# Patient Record
Sex: FEMALE | Race: OTHER | Employment: FULL TIME | URBAN - METROPOLITAN AREA
[De-identification: names, ages, dates, MRNs, and addresses within clinical notes are randomized per-mention and may not be internally consistent; named-entity substitution may affect disease eponyms.]

---

## 2017-04-26 RX ORDER — LEVOTHYROXINE SODIUM 0.05 MG/1
TABLET ORAL
Qty: 30 TABLET | Refills: 0 | Status: SHIPPED | OUTPATIENT
Start: 2017-04-26 | End: 2017-09-14

## 2017-07-10 RX ORDER — LEVOTHYROXINE SODIUM 0.05 MG/1
TABLET ORAL
Qty: 30 TABLET | Refills: 0 | OUTPATIENT
Start: 2017-07-10

## 2017-07-10 NOTE — TELEPHONE ENCOUNTER
LOV 3/2016. Letter already sent. Called patient but line disconnected. Sent mychart informing her she needs to schedule follow up.

## 2017-07-10 NOTE — TELEPHONE ENCOUNTER
Current Outpatient Prescriptions:  LEVOTHYROXINE SODIUM 50 MCG Oral Tab TAKE 1 TABLET (50 MCG TOTAL) BY MOUTH BEFORE BREAKFAST.  Disp: 30 tablet Rfl: 0

## 2017-07-12 RX ORDER — LEVOTHYROXINE SODIUM 0.05 MG/1
50 TABLET ORAL
Qty: 30 TABLET | Refills: 0 | Status: SHIPPED | OUTPATIENT
Start: 2017-07-12 | End: 2017-07-19

## 2017-07-12 NOTE — TELEPHONE ENCOUNTER
LOV 3/2016. Letter sent in April. Sent patient mychart message yesterday informing her she needs follow up and she did read message. Has not scheduled yet.

## 2017-07-18 ENCOUNTER — PATIENT MESSAGE (OUTPATIENT)
Dept: ENDOCRINOLOGY CLINIC | Facility: CLINIC | Age: 27
End: 2017-07-18

## 2017-07-18 NOTE — TELEPHONE ENCOUNTER
From: Luis Chow  To: Maykel Dodd MD  Sent: 7/18/2017 9:41 AM CDT  Subject: Prescription Question    Hello, i had recently had my prescription approved and it was only filled 1/4.  Now i received another message saying they were denied, why were they

## 2017-07-18 NOTE — TELEPHONE ENCOUNTER
SH, please advise. Pt has appt booked 09/13 (1st available) and would like refills to last until that appt.

## 2017-07-19 RX ORDER — LEVOTHYROXINE SODIUM 0.05 MG/1
50 TABLET ORAL
Qty: 30 TABLET | Refills: 1 | Status: SHIPPED | OUTPATIENT
Start: 2017-07-19 | End: 2017-09-13

## 2017-09-13 ENCOUNTER — OFFICE VISIT (OUTPATIENT)
Dept: ENDOCRINOLOGY CLINIC | Facility: CLINIC | Age: 27
End: 2017-09-13

## 2017-09-13 ENCOUNTER — TELEPHONE (OUTPATIENT)
Dept: ENDOCRINOLOGY CLINIC | Facility: CLINIC | Age: 27
End: 2017-09-13

## 2017-09-13 ENCOUNTER — APPOINTMENT (OUTPATIENT)
Dept: LAB | Age: 27
End: 2017-09-13
Attending: INTERNAL MEDICINE
Payer: COMMERCIAL

## 2017-09-13 VITALS
WEIGHT: 225.63 LBS | HEIGHT: 65 IN | HEART RATE: 79 BPM | BODY MASS INDEX: 37.59 KG/M2 | SYSTOLIC BLOOD PRESSURE: 112 MMHG | DIASTOLIC BLOOD PRESSURE: 78 MMHG

## 2017-09-13 DIAGNOSIS — E03.9 HYPOTHYROIDISM, UNSPECIFIED TYPE: Primary | ICD-10-CM

## 2017-09-13 DIAGNOSIS — E89.0 POSTABLATIVE HYPOTHYROIDISM: ICD-10-CM

## 2017-09-13 DIAGNOSIS — E89.0 POSTABLATIVE HYPOTHYROIDISM: Primary | ICD-10-CM

## 2017-09-13 LAB
T4 FREE SERPL-MCNC: 0.82 NG/DL (ref 0.58–1.64)
TSH SERPL-ACNC: 4.82 UIU/ML (ref 0.45–5.33)

## 2017-09-13 PROCEDURE — 84443 ASSAY THYROID STIM HORMONE: CPT

## 2017-09-13 PROCEDURE — 99213 OFFICE O/P EST LOW 20 MIN: CPT | Performed by: INTERNAL MEDICINE

## 2017-09-13 PROCEDURE — 99212 OFFICE O/P EST SF 10 MIN: CPT | Performed by: INTERNAL MEDICINE

## 2017-09-13 PROCEDURE — 36415 COLL VENOUS BLD VENIPUNCTURE: CPT

## 2017-09-13 PROCEDURE — 84439 ASSAY OF FREE THYROXINE: CPT

## 2017-09-13 RX ORDER — LEVOTHYROXINE SODIUM 0.05 MG/1
50 TABLET ORAL
Qty: 30 TABLET | Refills: 11 | Status: SHIPPED | OUTPATIENT
Start: 2017-09-13 | End: 2017-09-14

## 2017-09-13 NOTE — TELEPHONE ENCOUNTER
Please call patient - she is not getting enough thyroid hormone. Please increase LT4 to 75mcg PO daily, #30, refill 6 and repeat TSH, FT4 in 2 months.

## 2017-09-13 NOTE — PROGRESS NOTES
Name: Deric Beyer  Date: 9/13/2017    Referring Physician: No ref.  provider found    Patient presents with:  Hypothyroidism      HISTORY OF PRESENT ILLNESS   Deric Beyer is a 23year old female who presents for Patient presents with:  Hypothyroidis Allergies    Social History:   Social History    Marital status: Single              Spouse name:                       Years of education:                 Number of children:               Social History Main Topics    Smoking status: Never Smoker hypothyroidism  -Continue LT4 50mcg PO daily, discussed importance of taking medication in AM on empty stomach and waiting 30-60 min before eating  -Repeat TSH, FT4 to evaluate dose     RTC 1 year     9/13/2017  Tonya Mayo MD

## 2017-09-14 RX ORDER — LEVOTHYROXINE SODIUM 0.07 MG/1
75 TABLET ORAL
Qty: 30 TABLET | Refills: 6 | Status: CANCELLED | OUTPATIENT
Start: 2017-09-14

## 2017-09-14 NOTE — TELEPHONE ENCOUNTER
Spoke with Ava Leach and advised note from [facility] below. Pt understands to take increased dose of LT4 75 mcg daily in the morning 30-60 mins before meal and to repeat TSH and FT4 labs in 2 months around 11/14/17. Pt verbalized understanding.   Meds filled per [facility]

## 2017-10-05 ENCOUNTER — OFFICE VISIT (OUTPATIENT)
Dept: FAMILY MEDICINE CLINIC | Facility: CLINIC | Age: 27
End: 2017-10-05

## 2017-10-05 VITALS
DIASTOLIC BLOOD PRESSURE: 77 MMHG | WEIGHT: 222 LBS | HEIGHT: 65.5 IN | SYSTOLIC BLOOD PRESSURE: 109 MMHG | BODY MASS INDEX: 36.54 KG/M2 | HEART RATE: 79 BPM

## 2017-10-05 DIAGNOSIS — E89.0 HYPOTHYROIDISM, POSTABLATIVE: ICD-10-CM

## 2017-10-05 DIAGNOSIS — E66.09 NON MORBID OBESITY DUE TO EXCESS CALORIES: ICD-10-CM

## 2017-10-05 DIAGNOSIS — K57.30 DIVERTICULOSIS OF LARGE INTESTINE WITHOUT HEMORRHAGE: ICD-10-CM

## 2017-10-05 DIAGNOSIS — Z00.00 ANNUAL PHYSICAL EXAM: Primary | ICD-10-CM

## 2017-10-05 PROCEDURE — 99395 PREV VISIT EST AGE 18-39: CPT | Performed by: FAMILY MEDICINE

## 2017-10-05 RX ORDER — CLINDAMYCIN HYDROCHLORIDE 300 MG/1
1 CAPSULE ORAL 2 TIMES DAILY
COMMUNITY
Start: 2017-10-04 | End: 2018-12-08

## 2017-10-05 RX ORDER — HYDROCODONE BITARTRATE AND ACETAMINOPHEN 5; 325 MG/1; MG/1
1 TABLET ORAL 2 TIMES DAILY PRN
COMMUNITY
Start: 2017-10-04 | End: 2018-12-08

## 2017-10-05 RX ORDER — IBUPROFEN 800 MG/1
1 TABLET ORAL 2 TIMES DAILY PRN
COMMUNITY
Start: 2017-10-04 | End: 2018-12-08

## 2017-10-05 RX ORDER — LEVOTHYROXINE SODIUM 0.05 MG/1
1 TABLET ORAL DAILY
COMMUNITY
Start: 2017-09-13 | End: 2018-12-08

## 2017-10-05 NOTE — PROGRESS NOTES
Physical        Patient's past medical surgical family social history was reviewed. Review of Systems  Allergic: no environmental allergies or food allergies  Cardiovascular: no chest pain, irregular heartbeat, or palpitations.   Constitutional: no feve

## 2017-11-11 ENCOUNTER — APPOINTMENT (OUTPATIENT)
Dept: LAB | Age: 27
End: 2017-11-11
Attending: INTERNAL MEDICINE
Payer: COMMERCIAL

## 2017-11-11 DIAGNOSIS — E66.09 NON MORBID OBESITY DUE TO EXCESS CALORIES: ICD-10-CM

## 2017-11-11 DIAGNOSIS — Z00.00 ANNUAL PHYSICAL EXAM: ICD-10-CM

## 2017-11-11 DIAGNOSIS — E03.9 HYPOTHYROIDISM, UNSPECIFIED TYPE: ICD-10-CM

## 2017-11-11 PROCEDURE — 84443 ASSAY THYROID STIM HORMONE: CPT

## 2017-11-11 PROCEDURE — 80061 LIPID PANEL: CPT

## 2017-11-11 PROCEDURE — 80053 COMPREHEN METABOLIC PANEL: CPT

## 2017-11-11 PROCEDURE — 36415 COLL VENOUS BLD VENIPUNCTURE: CPT

## 2017-11-11 PROCEDURE — 84439 ASSAY OF FREE THYROXINE: CPT

## 2017-11-13 ENCOUNTER — TELEPHONE (OUTPATIENT)
Dept: ENDOCRINOLOGY CLINIC | Facility: CLINIC | Age: 27
End: 2017-11-13

## 2017-11-13 DIAGNOSIS — E03.9 HYPOTHYROIDISM, UNSPECIFIED TYPE: Primary | ICD-10-CM

## 2017-11-13 NOTE — TELEPHONE ENCOUNTER
Please call patient - she is still not getting enough thyroid hormone, increase LT4 100mcg PO daily #30, refill 6 and repeat TSH, FT4 in 2 months.

## 2017-11-14 RX ORDER — LEVOTHYROXINE SODIUM 0.07 MG/1
75 TABLET ORAL
Qty: 30 TABLET | Refills: 6 | Status: SHIPPED | OUTPATIENT
Start: 2017-11-14 | End: 2018-06-27

## 2017-11-14 NOTE — TELEPHONE ENCOUNTER
Called Jason. She states that several weeks ago she was supposed to increase her levothyroxine dose to 75 mcg but she continued with 50 mcg. Would you still like to increase to 100 mcg?

## 2017-11-18 ENCOUNTER — TELEPHONE (OUTPATIENT)
Dept: FAMILY MEDICINE CLINIC | Facility: CLINIC | Age: 27
End: 2017-11-18

## 2017-11-18 NOTE — TELEPHONE ENCOUNTER
----- Message from Aamir Lira DO sent at 11/17/2017  7:44 AM CST -----  Thyroid was off. Make sure she follows up with Dr. Nani Alonso.    CMP lipids were all normal

## 2018-06-28 RX ORDER — LEVOTHYROXINE SODIUM 0.07 MG/1
75 TABLET ORAL
Qty: 90 TABLET | Refills: 0 | Status: SHIPPED | OUTPATIENT
Start: 2018-06-28 | End: 2018-09-09

## 2018-06-28 NOTE — TELEPHONE ENCOUNTER
From: Micky Cohen  Sent: 6/27/2018 9:16 AM CDT  Subject: Medication Renewal Request    Micky Cohen would like a refill of the following medications:     Levothyroxine Sodium 75 MCG Oral Tab [Jennifer Lao MD]   Patient Comment: Did not get a chance

## 2018-09-10 RX ORDER — LEVOTHYROXINE SODIUM 0.07 MG/1
TABLET ORAL
Qty: 30 TABLET | Refills: 0 | Status: SHIPPED | OUTPATIENT
Start: 2018-09-10 | End: 2018-10-18

## 2018-10-18 RX ORDER — LEVOTHYROXINE SODIUM 0.07 MG/1
TABLET ORAL
Qty: 30 TABLET | Refills: 0 | Status: SHIPPED | OUTPATIENT
Start: 2018-10-18 | End: 2018-10-29

## 2018-10-27 ENCOUNTER — APPOINTMENT (OUTPATIENT)
Dept: LAB | Facility: HOSPITAL | Age: 28
End: 2018-10-27
Attending: INTERNAL MEDICINE
Payer: COMMERCIAL

## 2018-10-27 DIAGNOSIS — E03.9 HYPOTHYROIDISM, UNSPECIFIED TYPE: ICD-10-CM

## 2018-10-27 PROCEDURE — 84443 ASSAY THYROID STIM HORMONE: CPT

## 2018-10-27 PROCEDURE — 36415 COLL VENOUS BLD VENIPUNCTURE: CPT

## 2018-10-27 PROCEDURE — 84439 ASSAY OF FREE THYROXINE: CPT

## 2018-10-29 ENCOUNTER — OFFICE VISIT (OUTPATIENT)
Dept: ENDOCRINOLOGY CLINIC | Facility: CLINIC | Age: 28
End: 2018-10-29
Payer: COMMERCIAL

## 2018-10-29 VITALS
HEART RATE: 80 BPM | DIASTOLIC BLOOD PRESSURE: 79 MMHG | BODY MASS INDEX: 40 KG/M2 | WEIGHT: 246.38 LBS | SYSTOLIC BLOOD PRESSURE: 113 MMHG

## 2018-10-29 DIAGNOSIS — E03.8 HYPOTHYROIDISM DUE TO HASHIMOTO'S THYROIDITIS: Primary | ICD-10-CM

## 2018-10-29 DIAGNOSIS — E06.3 HYPOTHYROIDISM DUE TO HASHIMOTO'S THYROIDITIS: Primary | ICD-10-CM

## 2018-10-29 PROCEDURE — 99212 OFFICE O/P EST SF 10 MIN: CPT | Performed by: INTERNAL MEDICINE

## 2018-10-29 PROCEDURE — 99213 OFFICE O/P EST LOW 20 MIN: CPT | Performed by: INTERNAL MEDICINE

## 2018-10-29 RX ORDER — LEVOTHYROXINE SODIUM 0.1 MG/1
100 TABLET ORAL
Qty: 90 TABLET | Refills: 1 | Status: SHIPPED | OUTPATIENT
Start: 2018-10-29 | End: 2018-12-15

## 2018-10-29 NOTE — PROGRESS NOTES
Name: Radha Bains  Date: 10/29/2018    Referring Physician: No ref. provider found    Patient presents with: Follow - Up: Thyroid      HISTORY OF PRESENT ILLNESS   Radha Bains is a 33year old female who presents for Patient presents with:   Follow - U capsule by mouth 2 (two) times daily. , Disp: , Rfl:   •  ibuprofen 800 MG Oral Tab, Take 1 tablet by mouth 2 (two) times daily as needed. , Disp: , Rfl:   •  Levothyroxine Sodium 50 MCG Oral Tab, Take 1 tablet by mouth daily. , Disp: , Rfl:      Allergies: (111.8 kg)   BMI 40.38 kg/m²     General Appearance:  Alert, in no acute distress, well developed  Eyes: normal conjunctivae, sclera.   Ears/Nose/Mouth/Throat/Neck:  normal hearing, normal speech and diffusely enlarged thyroid gland, irregular texture  Neur

## 2018-12-08 ENCOUNTER — OFFICE VISIT (OUTPATIENT)
Dept: FAMILY MEDICINE CLINIC | Facility: CLINIC | Age: 28
End: 2018-12-08
Payer: COMMERCIAL

## 2018-12-08 ENCOUNTER — LAB ENCOUNTER (OUTPATIENT)
Dept: LAB | Age: 28
End: 2018-12-08
Attending: FAMILY MEDICINE
Payer: COMMERCIAL

## 2018-12-08 VITALS
SYSTOLIC BLOOD PRESSURE: 110 MMHG | WEIGHT: 240 LBS | DIASTOLIC BLOOD PRESSURE: 75 MMHG | HEIGHT: 65.5 IN | HEART RATE: 77 BPM | BODY MASS INDEX: 39.51 KG/M2

## 2018-12-08 DIAGNOSIS — Z12.4 ROUTINE PAPANICOLAOU SMEAR: ICD-10-CM

## 2018-12-08 DIAGNOSIS — Z00.00 ANNUAL PHYSICAL EXAM: ICD-10-CM

## 2018-12-08 DIAGNOSIS — E66.09 NON MORBID OBESITY DUE TO EXCESS CALORIES: ICD-10-CM

## 2018-12-08 DIAGNOSIS — E89.0 HYPOTHYROIDISM, POSTABLATIVE: ICD-10-CM

## 2018-12-08 DIAGNOSIS — Z00.00 ANNUAL PHYSICAL EXAM: Primary | ICD-10-CM

## 2018-12-08 DIAGNOSIS — N97.9 INFERTILITY, FEMALE: ICD-10-CM

## 2018-12-08 PROCEDURE — 84403 ASSAY OF TOTAL TESTOSTERONE: CPT

## 2018-12-08 PROCEDURE — 80053 COMPREHEN METABOLIC PANEL: CPT

## 2018-12-08 PROCEDURE — 85025 COMPLETE CBC W/AUTO DIFF WBC: CPT

## 2018-12-08 PROCEDURE — 36415 COLL VENOUS BLD VENIPUNCTURE: CPT

## 2018-12-08 PROCEDURE — 99395 PREV VISIT EST AGE 18-39: CPT | Performed by: FAMILY MEDICINE

## 2018-12-08 PROCEDURE — 90471 IMMUNIZATION ADMIN: CPT | Performed by: FAMILY MEDICINE

## 2018-12-08 PROCEDURE — 84402 ASSAY OF FREE TESTOSTERONE: CPT

## 2018-12-08 PROCEDURE — 90686 IIV4 VACC NO PRSV 0.5 ML IM: CPT | Performed by: FAMILY MEDICINE

## 2018-12-08 PROCEDURE — 80061 LIPID PANEL: CPT

## 2018-12-08 NOTE — PROGRESS NOTES
REASON FOR VISIT:    Graciela Mak is a 33year old female who presents for an [address]         Patient Active Problem List:     Hyperthyroidism     Left ovarian cyst     Non morbid obesity due to excess calories     Diverticulosis of large born [address] [facility]  No results found for: HCVAB   Tuberculosis Screen If high risk No components found for: PPDINDURAT       SPECIFIC DISEASE MONITORING Internal Lab or Procedure   No disease specific diagnoses    ALLERGIES:   No Known Allergies  CURRENT MEDI well nourished, in no apparent distress, obese  SKIN: no rashes, no suspicious lesions  HEENT: atraumatic, normocephalic, ears and throat are clear  EYES:PERRLA, EOMI, conjunctiva are clear.  normal optic disc  NECK: supple, no adenopathy, no bruits  CHEST: Annually   Pneumococcal if high risk   Td/Tdap once then every 10 years   HPV Males 11-21   Zoster (Shingles) 60 and older: one dose   Varicella 2 doses if not immune   MMR 1-2 doses if born after 1955 and not immune

## 2018-12-11 DIAGNOSIS — R71.8 LOW MEAN CORPUSCULAR VOLUME (MCV): Primary | ICD-10-CM

## 2018-12-14 ENCOUNTER — TELEPHONE (OUTPATIENT)
Dept: FAMILY MEDICINE CLINIC | Facility: CLINIC | Age: 28
End: 2018-12-14

## 2018-12-14 NOTE — TELEPHONE ENCOUNTER
Unable to LM. Lab test for iron order with existing blood.  NO need for patient to return for blood draw

## 2018-12-14 NOTE — TELEPHONE ENCOUNTER
----- Message from Harsha Nicolas DO sent at 12/11/2018  5:40 PM CST -----  Hormone levels were normal cholesterol and blood sugar were normal liver kidney testing was normal.  Her red blood cells were small so I will order some iron studies.   RN pleas

## 2018-12-15 DIAGNOSIS — E06.3 HYPOTHYROIDISM DUE TO HASHIMOTO'S THYROIDITIS: ICD-10-CM

## 2018-12-15 DIAGNOSIS — E03.8 HYPOTHYROIDISM DUE TO HASHIMOTO'S THYROIDITIS: ICD-10-CM

## 2018-12-17 RX ORDER — LEVOTHYROXINE SODIUM 0.1 MG/1
100 TABLET ORAL
Qty: 90 TABLET | Refills: 1 | Status: SHIPPED | OUTPATIENT
Start: 2018-12-17 | End: 2019-06-07

## 2018-12-19 ENCOUNTER — OFFICE VISIT (OUTPATIENT)
Dept: OBGYN CLINIC | Facility: CLINIC | Age: 28
End: 2018-12-19
Payer: COMMERCIAL

## 2018-12-19 ENCOUNTER — APPOINTMENT (OUTPATIENT)
Dept: LAB | Age: 28
End: 2018-12-19
Attending: FAMILY MEDICINE
Payer: COMMERCIAL

## 2018-12-19 ENCOUNTER — TELEPHONE (OUTPATIENT)
Dept: OBGYN CLINIC | Facility: CLINIC | Age: 28
End: 2018-12-19

## 2018-12-19 VITALS
HEART RATE: 91 BPM | DIASTOLIC BLOOD PRESSURE: 73 MMHG | WEIGHT: 242 LBS | HEIGHT: 65.5 IN | BODY MASS INDEX: 39.84 KG/M2 | SYSTOLIC BLOOD PRESSURE: 114 MMHG

## 2018-12-19 DIAGNOSIS — Z12.4 CERVICAL CANCER SCREENING: ICD-10-CM

## 2018-12-19 DIAGNOSIS — R71.8 LOW MEAN CORPUSCULAR VOLUME: ICD-10-CM

## 2018-12-19 DIAGNOSIS — Z31.69 ENCOUNTER FOR PRECONCEPTION CONSULTATION: ICD-10-CM

## 2018-12-19 DIAGNOSIS — Z01.419 ENCOUNTER FOR GYNECOLOGICAL EXAMINATION WITHOUT ABNORMAL FINDING: Primary | ICD-10-CM

## 2018-12-19 PROCEDURE — 84466 ASSAY OF TRANSFERRIN: CPT

## 2018-12-19 PROCEDURE — 99395 PREV VISIT EST AGE 18-39: CPT | Performed by: OBSTETRICS & GYNECOLOGY

## 2018-12-19 PROCEDURE — 83540 ASSAY OF IRON: CPT

## 2018-12-19 PROCEDURE — 36415 COLL VENOUS BLD VENIPUNCTURE: CPT

## 2018-12-19 NOTE — TELEPHONE ENCOUNTER
[address] called to report that pt needs a 20 min appt slot for her appt today in [place] since she is scheduled for an annual. [address] asked if RN can call pt and see if she can come at 9:20am instead of 9:30am. Spoke with pt who stated she can come for appt at 9:20am.

## 2018-12-19 NOTE — PROGRESS NOTES
Well Woman Exam    HPI:  The patient is a 28yo female who presents today for annual exam. She is thinking of trying to conceive. She has hypothyroidism and last TSH was near 5 (per patient). She has her PCP increasing dose of synthroid.  She plans to go to Sleep Concern: Not Asked        Stress Concern: Not Asked        Weight Concern: Not Asked        Special Diet: Not Asked        Back Care: Not Asked        Exercise: Not Asked        Bike Helmet: Not Asked        Seat Belt: Not Asked        Self-Exams: N distribution, and no lesions  Urethral Meatus:  normal in size, location, without lesions and prolapse  Bladder:  No fullness, masses or tenderness  Vagina:  Normal appearance without lesions, no abnormal discharge  Cervix:  Normal without tenderness on mo

## 2019-02-12 NOTE — TELEPHONE ENCOUNTER
LMTCB Telephone Encounter by Wendi Miranda RN at 06/27/17 01:05 PM     Author:  Wendi Miranda RN Service:  (none) Author Type:  Registered Nurse     Filed:  06/27/17 01:05 PM Encounter Date:  6/27/2017 Status:  Signed     :  Wendi Miranda RN (Registered Nurse)       From: Maureen Arzola  To: Natalya Zuñiga MD  Sent: 6/27/2017 10:50 AM CDT  Subject: Lab Tests  Test Related Questions    I was wondering if I could have an IgG antigliadin antibody test again.    Having exact same GI symptoms that I had back in 2000 - 2001 when I had   really bad gluten sensitivity.  Unfortunately this started after I went   from low gluten for a few weeks to \"normal\" diet.      BTW I also put in request to schedule 6-mo appointment- it was on my   calendar for July 6 but I don't see it on MyChart.    Thanks-  Maureen       Revision History        Date/Time User Provider Type Action    > 06/27/17 01:05 PM Wendi Miranda, RN Registered Nurse Sign    Attribution information within the note text is not available.

## 2019-06-06 ENCOUNTER — LAB ENCOUNTER (OUTPATIENT)
Dept: LAB | Facility: HOSPITAL | Age: 29
End: 2019-06-06
Attending: FAMILY MEDICINE
Payer: COMMERCIAL

## 2019-06-06 DIAGNOSIS — R71.8 LOW MEAN CORPUSCULAR VOLUME (MCV): ICD-10-CM

## 2019-06-06 DIAGNOSIS — E03.8 HYPOTHYROIDISM DUE TO HASHIMOTO'S THYROIDITIS: ICD-10-CM

## 2019-06-06 DIAGNOSIS — E06.3 HYPOTHYROIDISM DUE TO HASHIMOTO'S THYROIDITIS: ICD-10-CM

## 2019-06-06 DIAGNOSIS — E61.1 IRON DEFICIENCY: ICD-10-CM

## 2019-06-06 PROCEDURE — 84443 ASSAY THYROID STIM HORMONE: CPT

## 2019-06-06 PROCEDURE — 83540 ASSAY OF IRON: CPT

## 2019-06-06 PROCEDURE — 85025 COMPLETE CBC W/AUTO DIFF WBC: CPT

## 2019-06-06 PROCEDURE — 84439 ASSAY OF FREE THYROXINE: CPT

## 2019-06-06 PROCEDURE — 84466 ASSAY OF TRANSFERRIN: CPT

## 2019-06-06 PROCEDURE — 36415 COLL VENOUS BLD VENIPUNCTURE: CPT

## 2019-06-07 ENCOUNTER — TELEPHONE (OUTPATIENT)
Dept: ENDOCRINOLOGY CLINIC | Facility: CLINIC | Age: 29
End: 2019-06-07

## 2019-06-07 DIAGNOSIS — E89.0 POSTABLATIVE HYPOTHYROIDISM: Primary | ICD-10-CM

## 2019-06-07 RX ORDER — LEVOTHYROXINE SODIUM 112 UG/1
112 TABLET ORAL
Qty: 30 TABLET | Refills: 3 | Status: SHIPPED | OUTPATIENT
Start: 2019-06-07 | End: 2019-10-11

## 2019-06-07 NOTE — TELEPHONE ENCOUNTER
Spoke w/ Andreas Ordaz. She verbalizes understanding of lab result and is agreeable to increasing LT4 to 112 mcg PO daily as recommended. She will plan to repeat labs in 3 months. Med and labs ordered.

## 2019-06-07 NOTE — TELEPHONE ENCOUNTER
Please call patient - TSH is still above goal.  Increase LT4 to 112mcg PO daily, take medication in AM and waiting 30-60 min before eating. Repeat TSH, FT4 in 3 months. Thanks.

## 2019-10-11 ENCOUNTER — APPOINTMENT (OUTPATIENT)
Dept: LAB | Facility: HOSPITAL | Age: 29
End: 2019-10-11
Attending: INTERNAL MEDICINE
Payer: COMMERCIAL

## 2019-10-11 DIAGNOSIS — E89.0 POSTABLATIVE HYPOTHYROIDISM: ICD-10-CM

## 2019-10-11 PROCEDURE — 84443 ASSAY THYROID STIM HORMONE: CPT

## 2019-10-11 PROCEDURE — 36415 COLL VENOUS BLD VENIPUNCTURE: CPT

## 2019-10-11 PROCEDURE — 84439 ASSAY OF FREE THYROXINE: CPT

## 2019-10-11 RX ORDER — LEVOTHYROXINE SODIUM 112 UG/1
TABLET ORAL
Qty: 30 TABLET | Refills: 0 | Status: SHIPPED | OUTPATIENT
Start: 2019-10-11 | End: 2019-10-21

## 2019-10-21 ENCOUNTER — OFFICE VISIT (OUTPATIENT)
Dept: ENDOCRINOLOGY CLINIC | Facility: CLINIC | Age: 29
End: 2019-10-21
Payer: COMMERCIAL

## 2019-10-21 ENCOUNTER — TELEPHONE (OUTPATIENT)
Dept: OBGYN CLINIC | Facility: CLINIC | Age: 29
End: 2019-10-21

## 2019-10-21 VITALS
HEART RATE: 91 BPM | WEIGHT: 235 LBS | BODY MASS INDEX: 39 KG/M2 | SYSTOLIC BLOOD PRESSURE: 130 MMHG | DIASTOLIC BLOOD PRESSURE: 84 MMHG

## 2019-10-21 DIAGNOSIS — E89.0 POSTABLATIVE HYPOTHYROIDISM: ICD-10-CM

## 2019-10-21 PROCEDURE — 99213 OFFICE O/P EST LOW 20 MIN: CPT | Performed by: INTERNAL MEDICINE

## 2019-10-21 RX ORDER — LEVOTHYROXINE SODIUM 112 UG/1
TABLET ORAL
Qty: 90 TABLET | Refills: 3 | Status: SHIPPED | OUTPATIENT
Start: 2019-10-21 | End: 2019-12-18 | Stop reason: DRUGHIGH

## 2019-10-21 NOTE — TELEPHONE ENCOUNTER
Pt states she has appt with GARRY tomorrow 10/22/19 at 3:20pm. Pt states she made the appt for consult for fertility. Py asking if she needs labs done prior to appt tomorrow.  Informed pt tomorrows appt will be with [facility] for a consult and no labs needed prior

## 2019-10-21 NOTE — PROGRESS NOTES
Name: Yolis Ernst  Date: 10/21/2019    Referring Physician: No ref. provider found    Patient presents with: Follow - Up: Thyroid labs. HISTORY OF PRESENT ILLNESS   Yolis Ernst is a 33year old female who presents for Patient presents with:   Zackary Cooks Marital status:       Spouse name: Not on file      Number of children: Not on file      Years of education: Not on file      Highest education level: Not on file    Tobacco Use      Smoking status: Never Smoker      Smokeless tobacco: Never Used 1 year     10/21/2019  Vijay Domínguez MD

## 2019-10-21 NOTE — TELEPHONE ENCOUNTER
Pt also wants to know if she should be coming for an annual but is also trying to conceive and needs lab work  Wants to speak to a nurse before coming to apt on 10/22 at 320

## 2019-10-22 ENCOUNTER — OFFICE VISIT (OUTPATIENT)
Dept: OBGYN CLINIC | Facility: CLINIC | Age: 29
End: 2019-10-22

## 2019-10-22 VITALS
HEART RATE: 91 BPM | SYSTOLIC BLOOD PRESSURE: 119 MMHG | BODY MASS INDEX: 38 KG/M2 | DIASTOLIC BLOOD PRESSURE: 84 MMHG | WEIGHT: 234.19 LBS

## 2019-10-22 DIAGNOSIS — N97.9 FEMALE INFERTILITY: Primary | ICD-10-CM

## 2019-10-22 PROCEDURE — 99213 OFFICE O/P EST LOW 20 MIN: CPT | Performed by: OBSTETRICS & GYNECOLOGY

## 2019-10-22 NOTE — PROGRESS NOTES
Alexandra Allred is a 33year old female  Patient's last menstrual period was 10/07/2019. Patient presents with:  Gyn Exam: Fertility consult     Patient presents today due to not being able to conceive.  Pt has been trying to conceive for about 10 jyoti Physical activity:        Days per week: Not on file        Minutes per session: Not on file      Stress: Not on file    Relationships      Social connections:        Talks on phone: Not on file        Gets together: Not on file        Attends [employer] to get a head start on work up and getting pregnant. Reviewed option of work up and treatment with clomid with myself vs specialist. Reviewed better success rates with specialist and she would like to go immediately.  She does have BCBS PPO and can make an

## 2019-11-11 ENCOUNTER — APPOINTMENT (OUTPATIENT)
Dept: LAB | Facility: HOSPITAL | Age: 29
End: 2019-11-11
Attending: FAMILY MEDICINE
Payer: COMMERCIAL

## 2019-11-11 DIAGNOSIS — Z32.00 ENCOUNTER FOR PREGNANCY TEST, RESULT UNKNOWN: ICD-10-CM

## 2019-11-11 DIAGNOSIS — Z32.00 ENCOUNTER FOR PREGNANCY TEST, RESULT UNKNOWN: Primary | ICD-10-CM

## 2019-11-11 PROCEDURE — 36415 COLL VENOUS BLD VENIPUNCTURE: CPT

## 2019-11-11 PROCEDURE — 84703 CHORIONIC GONADOTROPIN ASSAY: CPT

## 2019-11-12 DIAGNOSIS — Z32.00 ENCOUNTER FOR PREGNANCY TEST, RESULT UNKNOWN: Primary | ICD-10-CM

## 2019-11-15 ENCOUNTER — PATIENT MESSAGE (OUTPATIENT)
Dept: OBGYN CLINIC | Facility: CLINIC | Age: 29
End: 2019-11-15

## 2019-11-15 ENCOUNTER — PATIENT MESSAGE (OUTPATIENT)
Dept: ENDOCRINOLOGY CLINIC | Facility: CLINIC | Age: 29
End: 2019-11-15

## 2019-11-15 ENCOUNTER — APPOINTMENT (OUTPATIENT)
Dept: LAB | Facility: HOSPITAL | Age: 29
End: 2019-11-15
Attending: FAMILY MEDICINE
Payer: COMMERCIAL

## 2019-11-15 DIAGNOSIS — Z32.00 ENCOUNTER FOR PREGNANCY TEST, RESULT UNKNOWN: ICD-10-CM

## 2019-11-15 DIAGNOSIS — E03.8 HYPOTHYROIDISM DUE TO HASHIMOTO'S THYROIDITIS: Primary | ICD-10-CM

## 2019-11-15 DIAGNOSIS — E06.3 HYPOTHYROIDISM DUE TO HASHIMOTO'S THYROIDITIS: Primary | ICD-10-CM

## 2019-11-15 PROCEDURE — 36415 COLL VENOUS BLD VENIPUNCTURE: CPT

## 2019-11-15 PROCEDURE — 84703 CHORIONIC GONADOTROPIN ASSAY: CPT

## 2019-11-15 NOTE — TELEPHONE ENCOUNTER
Patient pregnant. Confirmed today with serum HCG (in Epic).     Component      Latest Ref Rng & Units 10/11/2019   T4,Free (Direct)      0.8 - 1.7 ng/dL 1.3   TSH      0.358 - 3.740 mIU/mL 1.740

## 2019-11-15 NOTE — TELEPHONE ENCOUNTER
From: Yolis Ernst  To: Geovanny Carrera MD  Sent: 11/15/2019 1:23 PM CST  Subject: Other    Hi Dr. Gus Benton! So I just found out I am pregnant! Lamonte Butts! What do I need to do next for my thyroid? Should I continue to just take my current medication?     Thanks,

## 2019-11-15 NOTE — TELEPHONE ENCOUNTER
Pt reports lmp 10/7/19 (5w4d) with monthly menses. Pt agrees to seeing all 6 providers 4 female and 2 male. Pt aware first appt is with RN and not MD. Pt reports taking PNV that includes DHA, folic acid and iron.  Assisted pt with scheduling OBN appt for 11

## 2019-11-15 NOTE — TELEPHONE ENCOUNTER
From: Betzaida Mckeon  To: Trenda Dandy, MD  Sent: 11/15/2019 1:22 PM CST  Subject: Other    Hi! I guess I should've waited to see you about infertility because I just found out I'm pregnant! Vivi Martin! Do I need to go in and see you soon?     Thanks! :)

## 2019-11-27 ENCOUNTER — LAB ENCOUNTER (OUTPATIENT)
Dept: LAB | Facility: HOSPITAL | Age: 29
End: 2019-11-27
Attending: OBSTETRICS & GYNECOLOGY
Payer: COMMERCIAL

## 2019-11-27 ENCOUNTER — NURSE ONLY (OUTPATIENT)
Dept: OBGYN CLINIC | Facility: CLINIC | Age: 29
End: 2019-11-27
Payer: COMMERCIAL

## 2019-11-27 VITALS — WEIGHT: 235 LBS | HEIGHT: 65 IN | BODY MASS INDEX: 39.15 KG/M2

## 2019-11-27 DIAGNOSIS — Z34.01 ENCOUNTER FOR SUPERVISION OF NORMAL FIRST PREGNANCY IN FIRST TRIMESTER: Primary | ICD-10-CM

## 2019-11-27 DIAGNOSIS — Z34.01 ENCOUNTER FOR SUPERVISION OF NORMAL FIRST PREGNANCY IN FIRST TRIMESTER: ICD-10-CM

## 2019-11-27 PROCEDURE — 86803 HEPATITIS C AB TEST: CPT

## 2019-11-27 PROCEDURE — 99211 OFF/OP EST MAY X REQ PHY/QHP: CPT | Performed by: OBSTETRICS & GYNECOLOGY

## 2019-11-27 PROCEDURE — 86762 RUBELLA ANTIBODY: CPT

## 2019-11-27 PROCEDURE — 87389 HIV-1 AG W/HIV-1&-2 AB AG IA: CPT

## 2019-11-27 PROCEDURE — 85025 COMPLETE CBC W/AUTO DIFF WBC: CPT

## 2019-11-27 PROCEDURE — 36415 COLL VENOUS BLD VENIPUNCTURE: CPT

## 2019-11-27 PROCEDURE — 82950 GLUCOSE TEST: CPT

## 2019-11-27 PROCEDURE — 87086 URINE CULTURE/COLONY COUNT: CPT

## 2019-11-27 PROCEDURE — 86901 BLOOD TYPING SEROLOGIC RH(D): CPT

## 2019-11-27 PROCEDURE — 86850 RBC ANTIBODY SCREEN: CPT

## 2019-11-27 PROCEDURE — 86780 TREPONEMA PALLIDUM: CPT

## 2019-11-27 PROCEDURE — 86900 BLOOD TYPING SEROLOGIC ABO: CPT

## 2019-11-27 PROCEDURE — 87340 HEPATITIS B SURFACE AG IA: CPT

## 2019-11-27 RX ORDER — CHOLECALCIFEROL (VITAMIN D3) 25 MCG
1 TABLET,CHEWABLE ORAL DAILY
COMMUNITY
End: 2020-12-30

## 2019-11-27 NOTE — PROGRESS NOTES
Pt seen for OBN appt today with no complaints. Pt has normal monthly cycles and a +serum hcg in Epic. Normal PN labs, 1 hour GTT, and Hep C ordered. Pt advised all labs must be completed and resulted prior to MD appt.   Pt scheduled NPN appt with MD.    Pa Anencephaly): No   Congenital heart defect: No   Down syndrome: No   Duncan-Sachs ([place]kenazi [place], [facility], Darek): No   Canavan disease (Ashkenazi [place]): No   Familial dysautonomia (Ashkenazi Y[place]): No   Sickle cell disease or trait ():

## 2019-12-10 ENCOUNTER — INITIAL PRENATAL (OUTPATIENT)
Dept: OBGYN CLINIC | Facility: CLINIC | Age: 29
End: 2019-12-10
Payer: COMMERCIAL

## 2019-12-10 VITALS
WEIGHT: 233.63 LBS | SYSTOLIC BLOOD PRESSURE: 114 MMHG | DIASTOLIC BLOOD PRESSURE: 78 MMHG | BODY MASS INDEX: 39 KG/M2 | HEART RATE: 84 BPM

## 2019-12-10 DIAGNOSIS — Z34.91 ENCOUNTER FOR SUPERVISION OF NORMAL PREGNANCY IN FIRST TRIMESTER, UNSPECIFIED GRAVIDITY: Primary | ICD-10-CM

## 2019-12-10 PROBLEM — O99.280 HYPOTHYROIDISM AFFECTING PREGNANCY, ANTEPARTUM: Status: ACTIVE | Noted: 2019-12-10

## 2019-12-10 PROBLEM — E03.9 HYPOTHYROIDISM AFFECTING PREGNANCY, ANTEPARTUM: Status: ACTIVE | Noted: 2019-12-10

## 2019-12-10 PROBLEM — O99.280 HYPOTHYROIDISM AFFECTING PREGNANCY, ANTEPARTUM (HCC): Status: ACTIVE | Noted: 2019-12-10

## 2019-12-10 PROBLEM — E03.9 HYPOTHYROIDISM AFFECTING PREGNANCY, ANTEPARTUM (HCC): Status: ACTIVE | Noted: 2019-12-10

## 2019-12-10 PROCEDURE — 81002 URINALYSIS NONAUTO W/O SCOPE: CPT | Performed by: OBSTETRICS & GYNECOLOGY

## 2019-12-10 PROCEDURE — 76815 OB US LIMITED FETUS(S): CPT | Performed by: OBSTETRICS & GYNECOLOGY

## 2019-12-14 ENCOUNTER — PATIENT MESSAGE (OUTPATIENT)
Dept: OBGYN CLINIC | Facility: CLINIC | Age: 29
End: 2019-12-14

## 2019-12-16 ENCOUNTER — APPOINTMENT (OUTPATIENT)
Dept: LAB | Facility: HOSPITAL | Age: 29
End: 2019-12-16
Attending: INTERNAL MEDICINE
Payer: COMMERCIAL

## 2019-12-16 DIAGNOSIS — E06.3 HYPOTHYROIDISM DUE TO HASHIMOTO'S THYROIDITIS: ICD-10-CM

## 2019-12-16 DIAGNOSIS — E03.8 HYPOTHYROIDISM DUE TO HASHIMOTO'S THYROIDITIS: ICD-10-CM

## 2019-12-16 PROCEDURE — 84443 ASSAY THYROID STIM HORMONE: CPT

## 2019-12-16 PROCEDURE — 36415 COLL VENOUS BLD VENIPUNCTURE: CPT

## 2019-12-16 PROCEDURE — 84439 ASSAY OF FREE THYROXINE: CPT

## 2019-12-16 NOTE — TELEPHONE ENCOUNTER
From: Tiara Pedroza  To: Cali Fox MD  Sent: 12/14/2019 2:08 PM CST  Subject: Visit Follow-up Question    Helaneta Suh I reached out to my insurance company to see if the first trimester screen is covered and they replied with:   \"In order to determine

## 2019-12-18 ENCOUNTER — TELEPHONE (OUTPATIENT)
Dept: ENDOCRINOLOGY CLINIC | Facility: CLINIC | Age: 29
End: 2019-12-18

## 2019-12-18 DIAGNOSIS — E03.8 HYPOTHYROIDISM DUE TO HASHIMOTO'S THYROIDITIS: Primary | ICD-10-CM

## 2019-12-18 DIAGNOSIS — E06.3 HYPOTHYROIDISM DUE TO HASHIMOTO'S THYROIDITIS: Primary | ICD-10-CM

## 2019-12-18 RX ORDER — LEVOTHYROXINE SODIUM 0.12 MG/1
125 TABLET ORAL
Qty: 30 TABLET | Refills: 3 | Status: SHIPPED | OUTPATIENT
Start: 2019-12-18 | End: 2020-04-20

## 2019-12-18 NOTE — TELEPHONE ENCOUNTER
Spoke to pt and relayed Dr. Dulce Stallings message as shown below. Pt verbalized understanding and had no further questions at this time. LT4 125 mcg sent to pharmacy and TSH and FT4 entered.

## 2019-12-18 NOTE — TELEPHONE ENCOUNTER
Please call patient - she is not getting enough medication. Increase LT4 to 125mcg PO daily #30, refill 3 and repeat TSH, FT4 in 6 weeks. Thanks.

## 2020-01-07 ENCOUNTER — TELEPHONE (OUTPATIENT)
Dept: OBGYN CLINIC | Facility: CLINIC | Age: 30
End: 2020-01-07

## 2020-01-07 ENCOUNTER — ROUTINE PRENATAL (OUTPATIENT)
Dept: OBGYN CLINIC | Facility: CLINIC | Age: 30
End: 2020-01-07
Payer: COMMERCIAL

## 2020-01-07 VITALS
WEIGHT: 233 LBS | BODY MASS INDEX: 39 KG/M2 | DIASTOLIC BLOOD PRESSURE: 79 MMHG | SYSTOLIC BLOOD PRESSURE: 116 MMHG | HEART RATE: 88 BPM

## 2020-01-07 DIAGNOSIS — Z34.91 ENCOUNTER FOR SUPERVISION OF NORMAL PREGNANCY IN FIRST TRIMESTER, UNSPECIFIED GRAVIDITY: Primary | ICD-10-CM

## 2020-01-07 DIAGNOSIS — O99.281 HYPOTHYROID IN PREGNANCY, ANTEPARTUM, FIRST TRIMESTER: ICD-10-CM

## 2020-01-07 DIAGNOSIS — Z36.9 FIRST TRIMESTER SCREENING: Primary | ICD-10-CM

## 2020-01-07 DIAGNOSIS — E03.9 HYPOTHYROID IN PREGNANCY, ANTEPARTUM, FIRST TRIMESTER: ICD-10-CM

## 2020-01-07 LAB
MULTISTIX LOT#: NORMAL NUMERIC
PH, URINE: 5 (ref 4.5–8)
SPECIFIC GRAVITY: 1.03 (ref 1–1.03)
UROBILINOGEN,SEMI-QN: 0 MG/DL (ref 0–1.9)

## 2020-01-07 PROCEDURE — 90471 IMMUNIZATION ADMIN: CPT | Performed by: OBSTETRICS & GYNECOLOGY

## 2020-01-07 PROCEDURE — 90686 IIV4 VACC NO PRSV 0.5 ML IM: CPT | Performed by: OBSTETRICS & GYNECOLOGY

## 2020-01-07 PROCEDURE — 81002 URINALYSIS NONAUTO W/O SCOPE: CPT | Performed by: OBSTETRICS & GYNECOLOGY

## 2020-01-07 NOTE — PROGRESS NOTES
Administered Flu per FERNANDO. Pt tolerated well. VIS given. Encouraged to call with any questions or concerns. Pt confirmed.

## 2020-01-07 NOTE — PROGRESS NOTES
NO issues. Flu shot today. Wants genetics. Discussed may not be able to coordinate in time for cut off for FTS but if we are unable to perform, can do quad screen at next appt. Msg sent to referral team for [facility] FTS. Endo inc synthroid to [facility].   RTC

## 2020-01-07 NOTE — TELEPHONE ENCOUNTER
PT NOTIFIED ORDER HAS BEEN PLACED FOR FTS/CONSULT. STATES SHE HAS ALREADY CHECK WITH INSURANCE AND FTS IS COVERED. PHONE NUMBER GIVEN.

## 2020-01-07 NOTE — TELEPHONE ENCOUNTER
Pt wants FTS with MFM. She is 13w1d so see if we can process in time to get in this week. Pt is aware that may not be able to happen based on GA.

## 2020-01-08 NOTE — PROGRESS NOTES
Outpatient Maternal-Fetal Medicine Consultation    Dear Dr. Alec Prasad    Thank you for requesting a first trimester ultrasound and MFM consultation on your patient Tiara Pedroza.   As you are aware she is a 30year old female  with a singletonpregnan simple left adnexal cyst is noted measuring 17 mm x 23 mm x 21 mm. See PACS/Imaging Tab For Complete Ultrasound Report  I interpreted the results and reviewed them with the patient.     DISCUSSION  During her visit we discussed and reviewed the following obesity and hypertensive disorders during pregnancy has been consistently reported. In particular, maternal weight and BMI are independent risk factors for preeclampsia.              Studies have found that the increased risk of  birth in obese grav if a high risk for fetal aneuploidy is noted on first trimester screening. A complete fetal anatomic survey is recommended at 20-22 weeks.   In addition, second trimester maternal serum alpha-fetoprotein (MSAFP) is also advised to screen for fetal neural limit of normal for TSH in the first trimester of pregnancy is approximately 2.5 mU/L (3.0 mU/L in the second and third trimesters) rather than 4.5 to 5.0 mU/L used by most laboratories.  Total T4 and T3 levels during pregnancy are 1.5-fold higher than in n

## 2020-01-09 ENCOUNTER — HOSPITAL ENCOUNTER (OUTPATIENT)
Dept: PERINATAL CARE | Facility: HOSPITAL | Age: 30
Discharge: HOME | End: 2020-01-09
Attending: OBSTETRICS & GYNECOLOGY
Payer: COMMERCIAL

## 2020-01-09 VITALS — HEART RATE: 94 BPM | SYSTOLIC BLOOD PRESSURE: 122 MMHG | DIASTOLIC BLOOD PRESSURE: 84 MMHG

## 2020-01-09 DIAGNOSIS — O99.211 OBESITY AFFECTING PREGNANCY IN FIRST TRIMESTER: ICD-10-CM

## 2020-01-09 DIAGNOSIS — Z36.9 FIRST TRIMESTER SCREENING: ICD-10-CM

## 2020-01-09 DIAGNOSIS — O99.280 HYPOTHYROIDISM AFFECTING PREGNANCY, ANTEPARTUM: ICD-10-CM

## 2020-01-09 DIAGNOSIS — E03.9 HYPOTHYROIDISM AFFECTING PREGNANCY, ANTEPARTUM: ICD-10-CM

## 2020-01-09 DIAGNOSIS — Z36.9 FIRST TRIMESTER SCREENING: Primary | ICD-10-CM

## 2020-01-09 PROCEDURE — 99241 OFFICE CONSULTATION,LEVEL I: CPT | Performed by: OBSTETRICS & GYNECOLOGY

## 2020-01-09 PROCEDURE — 76813 OB US NUCHAL MEAS 1 GEST: CPT | Performed by: OBSTETRICS & GYNECOLOGY

## 2020-01-13 ENCOUNTER — TELEPHONE (OUTPATIENT)
Dept: PERINATAL CARE | Facility: HOSPITAL | Age: 30
End: 2020-01-13

## 2020-01-13 NOTE — TELEPHONE ENCOUNTER
[facility] FTS results and Dr Oneil Raymundo reviewed and signed off  Spoke with Abraham Singer and informed her that the probability for Trisomy 13,18  after screening is 1 in > 10,000 and for Trisomy 21 1 in > 10,000.   These results within range  Pt verbalized understandin

## 2020-02-04 ENCOUNTER — ROUTINE PRENATAL (OUTPATIENT)
Dept: OBGYN CLINIC | Facility: CLINIC | Age: 30
End: 2020-02-04
Payer: COMMERCIAL

## 2020-02-04 ENCOUNTER — LAB ENCOUNTER (OUTPATIENT)
Dept: LAB | Facility: HOSPITAL | Age: 30
End: 2020-02-04
Attending: OBSTETRICS & GYNECOLOGY
Payer: COMMERCIAL

## 2020-02-04 VITALS
BODY MASS INDEX: 39 KG/M2 | WEIGHT: 232 LBS | HEART RATE: 90 BPM | DIASTOLIC BLOOD PRESSURE: 79 MMHG | SYSTOLIC BLOOD PRESSURE: 112 MMHG

## 2020-02-04 DIAGNOSIS — E06.3 HYPOTHYROIDISM DUE TO HASHIMOTO'S THYROIDITIS: ICD-10-CM

## 2020-02-04 DIAGNOSIS — Z34.02 ENCOUNTER FOR SUPERVISION OF NORMAL FIRST PREGNANCY IN SECOND TRIMESTER: ICD-10-CM

## 2020-02-04 DIAGNOSIS — E03.8 HYPOTHYROIDISM DUE TO HASHIMOTO'S THYROIDITIS: ICD-10-CM

## 2020-02-04 DIAGNOSIS — Z34.02 ENCOUNTER FOR SUPERVISION OF NORMAL FIRST PREGNANCY IN SECOND TRIMESTER: Primary | ICD-10-CM

## 2020-02-04 LAB
APPEARANCE: CLEAR
MULTISTIX LOT#: NORMAL NUMERIC
T4 FREE SERPL-MCNC: 1.2 NG/DL (ref 0.8–1.7)
TSI SER-ACNC: 2.23 MIU/ML (ref 0.36–3.74)
URINE-COLOR: YELLOW

## 2020-02-04 PROCEDURE — 81002 URINALYSIS NONAUTO W/O SCOPE: CPT | Performed by: OBSTETRICS & GYNECOLOGY

## 2020-02-04 PROCEDURE — 36415 COLL VENOUS BLD VENIPUNCTURE: CPT

## 2020-02-04 PROCEDURE — 82105 ALPHA-FETOPROTEIN SERUM: CPT

## 2020-02-04 PROCEDURE — 84443 ASSAY THYROID STIM HORMONE: CPT

## 2020-02-04 PROCEDURE — 84439 ASSAY OF FREE THYROXINE: CPT

## 2020-02-07 ENCOUNTER — TELEPHONE (OUTPATIENT)
Dept: OBGYN CLINIC | Facility: CLINIC | Age: 30
End: 2020-02-07

## 2020-02-07 LAB
AFP SMOKING: NO
FAMILY HX NEURAL TUBE DEFECT: NO
INSULIN REQ MATERNAL DIABETES: NO
MATERNAL AGE OF DELIVERY: 28.7 YR
MOM FOR AFP: 0.68
PATIENT'S AFP: 19 NG/ML

## 2020-02-07 NOTE — TELEPHONE ENCOUNTER
Received call from [facility] because pt called them to schedule level 2 US but there is no order. Pt had PN appt with JOVANNYK on 2/4 and JLK ordered OB US level 1. Pt has BMI 39. 1. routed to Maylin Brizuela [id number] to ask if pt should do level 2 US?

## 2020-02-21 ENCOUNTER — TELEPHONE (OUTPATIENT)
Dept: OBGYN CLINIC | Facility: CLINIC | Age: 30
End: 2020-02-21

## 2020-02-21 NOTE — TELEPHONE ENCOUNTER
PT STATES SHE GOT A MY CHART MESSAGE FROM [facility] STATING THE COST OF THE ULTRASOUND, THE ESTIMATED COVERAGE BY HER INSURANCE AND THEN WHAT PORTION OF THE BILL WOULD BE HER RESPONSIBILITY.   THAT PORTION IS OVER $400 SO IS WONDERING IF SHE NEEDS TO GET THIS VALORIE

## 2020-02-25 ENCOUNTER — HOSPITAL ENCOUNTER (OUTPATIENT)
Dept: ULTRASOUND IMAGING | Facility: HOSPITAL | Age: 30
Discharge: HOME | End: 2020-02-25
Attending: OBSTETRICS & GYNECOLOGY
Payer: COMMERCIAL

## 2020-02-25 DIAGNOSIS — Z34.02 ENCOUNTER FOR SUPERVISION OF NORMAL FIRST PREGNANCY IN SECOND TRIMESTER: ICD-10-CM

## 2020-02-25 PROCEDURE — 76805 OB US >/= 14 WKS SNGL FETUS: CPT | Performed by: OBSTETRICS & GYNECOLOGY

## 2020-03-03 ENCOUNTER — ROUTINE PRENATAL (OUTPATIENT)
Dept: OBGYN CLINIC | Facility: CLINIC | Age: 30
End: 2020-03-03
Payer: COMMERCIAL

## 2020-03-03 VITALS
SYSTOLIC BLOOD PRESSURE: 116 MMHG | WEIGHT: 235.19 LBS | BODY MASS INDEX: 39 KG/M2 | HEART RATE: 88 BPM | DIASTOLIC BLOOD PRESSURE: 77 MMHG

## 2020-03-03 DIAGNOSIS — Z34.02 ENCOUNTER FOR SUPERVISION OF NORMAL FIRST PREGNANCY IN SECOND TRIMESTER: Primary | ICD-10-CM

## 2020-03-03 LAB
APPEARANCE: CLEAR
MULTISTIX LOT#: NORMAL NUMERIC
PH, URINE: 6 (ref 4.5–8)
SPECIFIC GRAVITY: 1.01 (ref 1–1.03)
URINE-COLOR: YELLOW
UROBILINOGEN,SEMI-QN: 0.2 MG/DL (ref 0–1.9)

## 2020-03-03 PROCEDURE — 81002 URINALYSIS NONAUTO W/O SCOPE: CPT | Performed by: OBSTETRICS & GYNECOLOGY

## 2020-03-19 ENCOUNTER — MED REC SCAN ONLY (OUTPATIENT)
Dept: OBGYN CLINIC | Facility: CLINIC | Age: 30
End: 2020-03-19

## 2020-03-19 NOTE — PROGRESS NOTES
RECEIVED LETTER INDICATING PT IS A VOLUNTARY PARTICIPANT IN THE SPECIAL BEGINNINGS PROGRAM WITH BCBS. LETTER TO SCANNING.

## 2020-04-03 ENCOUNTER — VIRTUAL PHONE E/M (OUTPATIENT)
Dept: OBGYN CLINIC | Facility: CLINIC | Age: 30
End: 2020-04-03
Payer: COMMERCIAL

## 2020-04-03 DIAGNOSIS — Z34.92 ENCOUNTER FOR SUPERVISION OF NORMAL PREGNANCY IN SECOND TRIMESTER, UNSPECIFIED GRAVIDITY: Primary | ICD-10-CM

## 2020-04-04 NOTE — TELEPHONE ENCOUNTER
This visit was completed via telephone due to the restrictions of COVID-19 pandemic. All issues as below were discussed and addressed, but no physical exam was performed due to the limitations of an audio-only modality.  If it was felt that the patient shou

## 2020-04-06 ENCOUNTER — TELEPHONE (OUTPATIENT)
Dept: ADMINISTRATIVE | Age: 30
End: 2020-04-06

## 2020-04-08 ENCOUNTER — TELEPHONE (OUTPATIENT)
Dept: OBGYN CLINIC | Facility: CLINIC | Age: 30
End: 2020-04-08

## 2020-04-13 NOTE — TELEPHONE ENCOUNTER
Patient working up to her MARLENY 7/13/20. Patient will give consent through MyChart and complete paper HIPAA ant her next appt.  Sc

## 2020-04-20 RX ORDER — LEVOTHYROXINE SODIUM 0.12 MG/1
TABLET ORAL
Qty: 30 TABLET | Refills: 3 | Status: SHIPPED | OUTPATIENT
Start: 2020-04-20 | End: 2020-06-17

## 2020-04-21 NOTE — TELEPHONE ENCOUNTER
Dr. Charley Ni,    Please sign off on form:  -Highlight the patient and hit \"Chart\" button. -In Chart Review, w/in the Encounter tab - click 1 time on the Telephone call encounter for 4/6/20 . Scroll down the telephone encounter.  -Click \"scan on\" blue Hyperlink under \"Media\" heading for FMLA Dr. Charley Ni 4/6/20 w/in the telephone enc.  -Click on Acknowledge button at the bottom right corner and left-click onto image, signature stamp appears and drag signature to Provider signature line. Stamp will turn blue. Close window.     Thank you,    Lelo Viera

## 2020-04-23 ENCOUNTER — APPOINTMENT (OUTPATIENT)
Dept: LAB | Facility: HOSPITAL | Age: 30
End: 2020-04-23
Attending: OBSTETRICS & GYNECOLOGY
Payer: COMMERCIAL

## 2020-04-23 ENCOUNTER — ROUTINE PRENATAL (OUTPATIENT)
Dept: OBGYN CLINIC | Facility: CLINIC | Age: 30
End: 2020-04-23
Payer: COMMERCIAL

## 2020-04-23 VITALS
DIASTOLIC BLOOD PRESSURE: 78 MMHG | WEIGHT: 234 LBS | HEART RATE: 96 BPM | BODY MASS INDEX: 39 KG/M2 | SYSTOLIC BLOOD PRESSURE: 116 MMHG

## 2020-04-23 DIAGNOSIS — O99.213 OBESITY AFFECTING PREGNANCY IN THIRD TRIMESTER: ICD-10-CM

## 2020-04-23 DIAGNOSIS — Z34.92 ENCOUNTER FOR SUPERVISION OF NORMAL PREGNANCY IN SECOND TRIMESTER, UNSPECIFIED GRAVIDITY: ICD-10-CM

## 2020-04-23 DIAGNOSIS — Z34.92 ENCOUNTER FOR SUPERVISION OF NORMAL PREGNANCY IN SECOND TRIMESTER, UNSPECIFIED GRAVIDITY: Primary | ICD-10-CM

## 2020-04-23 PROCEDURE — 82950 GLUCOSE TEST: CPT

## 2020-04-23 PROCEDURE — 85027 COMPLETE CBC AUTOMATED: CPT

## 2020-04-23 PROCEDURE — 36415 COLL VENOUS BLD VENIPUNCTURE: CPT

## 2020-04-23 PROCEDURE — 84443 ASSAY THYROID STIM HORMONE: CPT

## 2020-04-23 PROCEDURE — 81002 URINALYSIS NONAUTO W/O SCOPE: CPT | Performed by: OBSTETRICS & GYNECOLOGY

## 2020-04-24 NOTE — TELEPHONE ENCOUNTER
Dr. Maty Castillo,    Sorry, I am not sure what happened but resending for your sign off. Please sign off on form:  -Highlight the patient and hit \"Chart\" button. -In Chart Review, w/in the Encounter tab - click 1 time on the Telephone call encounter for 4/6/20. Scroll down the telephone encounter.  -Click \"scan on\" blue Hyperlink under \"Media\" heading for FMLA Dr. Maty Castillo 4/6/20 w/in the telephone enc.  -Click on Acknowledge button at the bottom right corner and left-click onto image, signature stamp appears and drag signature to Provider signature line. Stamp will turn blue. Close window.     Thank you,    Clayton Mendez

## 2020-05-05 ENCOUNTER — PATIENT MESSAGE (OUTPATIENT)
Dept: ENDOCRINOLOGY CLINIC | Facility: CLINIC | Age: 30
End: 2020-05-05

## 2020-05-05 ENCOUNTER — VIRTUAL PHONE E/M (OUTPATIENT)
Dept: OBGYN CLINIC | Facility: CLINIC | Age: 30
End: 2020-05-05
Payer: COMMERCIAL

## 2020-05-05 DIAGNOSIS — E06.3 HYPOTHYROIDISM DUE TO HASHIMOTO'S THYROIDITIS: Primary | ICD-10-CM

## 2020-05-05 DIAGNOSIS — E03.8 HYPOTHYROIDISM DUE TO HASHIMOTO'S THYROIDITIS: Primary | ICD-10-CM

## 2020-05-05 DIAGNOSIS — Z34.83 ENCOUNTER FOR SUPERVISION OF OTHER NORMAL PREGNANCY IN THIRD TRIMESTER: Primary | ICD-10-CM

## 2020-05-05 NOTE — TELEPHONE ENCOUNTER
From: Donis Rios  To: Fuad Yung MD  Sent: 5/5/2020 12:28 PM CDT  Subject: Non-Urgent Jeni Turner Peers - my OB wanted me to check in to see if I needed any monitoring of my thyroid right now while pregnant.  They requested [facility] blood te

## 2020-05-05 NOTE — TELEPHONE ENCOUNTER
Please see patient's message below and advise if you would like her to have further thyroid testing. I have asked her to have a copy of the results faxed to the office here.

## 2020-05-19 ENCOUNTER — ROUTINE PRENATAL (OUTPATIENT)
Dept: OBGYN CLINIC | Facility: CLINIC | Age: 30
End: 2020-05-19
Payer: COMMERCIAL

## 2020-05-19 ENCOUNTER — HOSPITAL ENCOUNTER (OUTPATIENT)
Dept: ULTRASOUND IMAGING | Facility: HOSPITAL | Age: 30
Discharge: HOME | End: 2020-05-19
Attending: OBSTETRICS & GYNECOLOGY
Payer: COMMERCIAL

## 2020-05-19 VITALS
DIASTOLIC BLOOD PRESSURE: 75 MMHG | HEART RATE: 101 BPM | WEIGHT: 235.81 LBS | SYSTOLIC BLOOD PRESSURE: 110 MMHG | BODY MASS INDEX: 39 KG/M2

## 2020-05-19 DIAGNOSIS — O99.213 OBESITY AFFECTING PREGNANCY IN THIRD TRIMESTER: ICD-10-CM

## 2020-05-19 DIAGNOSIS — Z34.92 ENCOUNTER FOR SUPERVISION OF NORMAL PREGNANCY IN SECOND TRIMESTER, UNSPECIFIED GRAVIDITY: ICD-10-CM

## 2020-05-19 DIAGNOSIS — Z34.03 ENCOUNTER FOR SUPERVISION OF NORMAL FIRST PREGNANCY IN THIRD TRIMESTER: Primary | ICD-10-CM

## 2020-05-19 PROCEDURE — 90471 IMMUNIZATION ADMIN: CPT | Performed by: OBSTETRICS & GYNECOLOGY

## 2020-05-19 PROCEDURE — 90715 TDAP VACCINE 7 YRS/> IM: CPT | Performed by: OBSTETRICS & GYNECOLOGY

## 2020-05-19 PROCEDURE — 3074F SYST BP LT 130 MM HG: CPT | Performed by: OBSTETRICS & GYNECOLOGY

## 2020-05-19 PROCEDURE — 76816 OB US FOLLOW-UP PER FETUS: CPT | Performed by: OBSTETRICS & GYNECOLOGY

## 2020-05-19 PROCEDURE — 3078F DIAST BP <80 MM HG: CPT | Performed by: OBSTETRICS & GYNECOLOGY

## 2020-05-19 PROCEDURE — 81002 URINALYSIS NONAUTO W/O SCOPE: CPT | Performed by: OBSTETRICS & GYNECOLOGY

## 2020-06-01 ENCOUNTER — ROUTINE PRENATAL (OUTPATIENT)
Dept: OBGYN CLINIC | Facility: CLINIC | Age: 30
End: 2020-06-01
Payer: COMMERCIAL

## 2020-06-01 VITALS
HEART RATE: 87 BPM | WEIGHT: 238.63 LBS | SYSTOLIC BLOOD PRESSURE: 111 MMHG | DIASTOLIC BLOOD PRESSURE: 76 MMHG | BODY MASS INDEX: 40 KG/M2

## 2020-06-01 DIAGNOSIS — Z34.93 ENCOUNTER FOR SUPERVISION OF NORMAL PREGNANCY IN THIRD TRIMESTER, UNSPECIFIED GRAVIDITY: Primary | ICD-10-CM

## 2020-06-15 ENCOUNTER — ROUTINE PRENATAL (OUTPATIENT)
Dept: OBGYN CLINIC | Facility: CLINIC | Age: 30
End: 2020-06-15
Payer: COMMERCIAL

## 2020-06-15 ENCOUNTER — LAB ENCOUNTER (OUTPATIENT)
Dept: LAB | Facility: HOSPITAL | Age: 30
End: 2020-06-15
Attending: OBSTETRICS & GYNECOLOGY
Payer: COMMERCIAL

## 2020-06-15 VITALS
SYSTOLIC BLOOD PRESSURE: 118 MMHG | HEART RATE: 89 BPM | BODY MASS INDEX: 40 KG/M2 | DIASTOLIC BLOOD PRESSURE: 80 MMHG | WEIGHT: 239.38 LBS

## 2020-06-15 DIAGNOSIS — Z34.93 ENCOUNTER FOR SUPERVISION OF NORMAL PREGNANCY IN THIRD TRIMESTER, UNSPECIFIED GRAVIDITY: ICD-10-CM

## 2020-06-15 DIAGNOSIS — E06.3 HYPOTHYROIDISM DUE TO HASHIMOTO'S THYROIDITIS: ICD-10-CM

## 2020-06-15 DIAGNOSIS — Z34.93 ENCOUNTER FOR SUPERVISION OF NORMAL PREGNANCY IN THIRD TRIMESTER, UNSPECIFIED GRAVIDITY: Primary | ICD-10-CM

## 2020-06-15 DIAGNOSIS — E03.8 HYPOTHYROIDISM DUE TO HASHIMOTO'S THYROIDITIS: ICD-10-CM

## 2020-06-15 PROCEDURE — 84439 ASSAY OF FREE THYROXINE: CPT

## 2020-06-15 PROCEDURE — 81002 URINALYSIS NONAUTO W/O SCOPE: CPT | Performed by: OBSTETRICS & GYNECOLOGY

## 2020-06-15 PROCEDURE — 36415 COLL VENOUS BLD VENIPUNCTURE: CPT

## 2020-06-15 PROCEDURE — 84443 ASSAY THYROID STIM HORMONE: CPT

## 2020-06-15 PROCEDURE — 85027 COMPLETE CBC AUTOMATED: CPT

## 2020-06-15 PROCEDURE — 87389 HIV-1 AG W/HIV-1&-2 AB AG IA: CPT

## 2020-06-15 PROCEDURE — 86780 TREPONEMA PALLIDUM: CPT

## 2020-06-17 ENCOUNTER — TELEPHONE (OUTPATIENT)
Dept: ENDOCRINOLOGY CLINIC | Facility: CLINIC | Age: 30
End: 2020-06-17

## 2020-06-17 DIAGNOSIS — E03.8 HYPOTHYROIDISM DUE TO HASHIMOTO'S THYROIDITIS: Primary | ICD-10-CM

## 2020-06-17 DIAGNOSIS — E06.3 HYPOTHYROIDISM DUE TO HASHIMOTO'S THYROIDITIS: Primary | ICD-10-CM

## 2020-06-17 RX ORDER — LEVOTHYROXINE SODIUM 137 UG/1
137 TABLET ORAL
Qty: 30 TABLET | Refills: 3 | Status: SHIPPED | OUTPATIENT
Start: 2020-06-17 | End: 2020-10-15

## 2020-06-17 NOTE — TELEPHONE ENCOUNTER
Please call patient - thyroid levels are not at goal.  Increase Levothyroxine to 137mcg PO daily #30, refill 3 and repeat TSH, FT4 in one month.   Thank you

## 2020-06-17 NOTE — TELEPHONE ENCOUNTER
Patient contacted (Name and  of pt verified). All results and recommendations reviewed. Patient verbalizes understanding, denies further questions and agrees with plan of care. Rx sent as written. Lab orders placed as written.

## 2020-06-22 ENCOUNTER — ROUTINE PRENATAL (OUTPATIENT)
Dept: OBGYN CLINIC | Facility: CLINIC | Age: 30
End: 2020-06-22
Payer: COMMERCIAL

## 2020-06-22 VITALS
WEIGHT: 240.19 LBS | HEART RATE: 91 BPM | DIASTOLIC BLOOD PRESSURE: 81 MMHG | SYSTOLIC BLOOD PRESSURE: 118 MMHG | BODY MASS INDEX: 40 KG/M2

## 2020-06-22 DIAGNOSIS — Z34.93 ENCOUNTER FOR SUPERVISION OF NORMAL PREGNANCY IN THIRD TRIMESTER, UNSPECIFIED GRAVIDITY: Primary | ICD-10-CM

## 2020-06-22 PROCEDURE — 81002 URINALYSIS NONAUTO W/O SCOPE: CPT | Performed by: OBSTETRICS & GYNECOLOGY

## 2020-07-01 ENCOUNTER — ROUTINE PRENATAL (OUTPATIENT)
Dept: OBGYN CLINIC | Facility: CLINIC | Age: 30
End: 2020-07-01
Payer: COMMERCIAL

## 2020-07-01 VITALS
WEIGHT: 241 LBS | DIASTOLIC BLOOD PRESSURE: 84 MMHG | SYSTOLIC BLOOD PRESSURE: 128 MMHG | HEART RATE: 83 BPM | BODY MASS INDEX: 40 KG/M2

## 2020-07-01 DIAGNOSIS — Z34.03 ENCOUNTER FOR SUPERVISION OF NORMAL FIRST PREGNANCY IN THIRD TRIMESTER: Primary | ICD-10-CM

## 2020-07-01 LAB
APPEARANCE: CLEAR
MULTISTIX LOT#: 1044 NUMERIC
URINE-COLOR: YELLOW

## 2020-07-01 PROCEDURE — 81002 URINALYSIS NONAUTO W/O SCOPE: CPT | Performed by: OBSTETRICS & GYNECOLOGY

## 2020-07-06 ENCOUNTER — ROUTINE PRENATAL (OUTPATIENT)
Dept: OBGYN CLINIC | Facility: CLINIC | Age: 30
End: 2020-07-06
Payer: COMMERCIAL

## 2020-07-06 VITALS
BODY MASS INDEX: 40 KG/M2 | DIASTOLIC BLOOD PRESSURE: 76 MMHG | WEIGHT: 240.81 LBS | SYSTOLIC BLOOD PRESSURE: 110 MMHG | HEART RATE: 82 BPM

## 2020-07-06 DIAGNOSIS — Z34.03 ENCOUNTER FOR SUPERVISION OF NORMAL FIRST PREGNANCY IN THIRD TRIMESTER: Primary | ICD-10-CM

## 2020-07-06 LAB
MULTISTIX LOT#: NORMAL NUMERIC
PH, URINE: 6.5 (ref 4.5–8)
SPECIFIC GRAVITY: 1.01 (ref 1–1.03)
URINE-COLOR: YELLOW
UROBILINOGEN,SEMI-QN: 0.2 MG/DL (ref 0–1.9)

## 2020-07-06 PROCEDURE — 81002 URINALYSIS NONAUTO W/O SCOPE: CPT | Performed by: OBSTETRICS & GYNECOLOGY

## 2020-07-11 ENCOUNTER — PATIENT MESSAGE (OUTPATIENT)
Dept: OBGYN CLINIC | Facility: CLINIC | Age: 30
End: 2020-07-11

## 2020-07-12 ENCOUNTER — TELEPHONE (OUTPATIENT)
Dept: OBGYN CLINIC | Facility: CLINIC | Age: 30
End: 2020-07-12

## 2020-07-12 ENCOUNTER — HOSPITAL ENCOUNTER (OUTPATIENT)
Facility: HOSPITAL | Age: 30
Setting detail: OBSERVATION
Discharge: HOME | End: 2020-07-12
Attending: OBSTETRICS & GYNECOLOGY | Admitting: OBSTETRICS & GYNECOLOGY
Payer: COMMERCIAL

## 2020-07-12 VITALS
HEART RATE: 84 BPM | TEMPERATURE: 98 F | DIASTOLIC BLOOD PRESSURE: 69 MMHG | RESPIRATION RATE: 16 BRPM | SYSTOLIC BLOOD PRESSURE: 119 MMHG

## 2020-07-12 PROBLEM — Z34.90 PREGNANCY (HCC): Status: ACTIVE | Noted: 2020-07-12

## 2020-07-12 PROBLEM — Z34.90 PREGNANCY: Status: ACTIVE | Noted: 2020-07-12

## 2020-07-12 LAB
ALBUMIN SERPL-MCNC: 2.7 G/DL (ref 3.4–5)
ALBUMIN/GLOB SERPL: 0.6 {RATIO} (ref 1–2)
ALP LIVER SERPL-CCNC: 146 U/L (ref 37–98)
ALT SERPL-CCNC: 19 U/L (ref 13–56)
ANION GAP SERPL CALC-SCNC: 4 MMOL/L (ref 0–18)
AST SERPL-CCNC: 20 U/L (ref 15–37)
BASOPHILS # BLD AUTO: 0.02 X10(3) UL (ref 0–0.2)
BASOPHILS NFR BLD AUTO: 0.2 %
BILIRUB SERPL-MCNC: 0.5 MG/DL (ref 0.1–2)
BUN BLD-MCNC: 11 MG/DL (ref 7–18)
BUN/CREAT SERPL: 12.6 (ref 10–20)
CALCIUM BLD-MCNC: 9 MG/DL (ref 8.5–10.1)
CHLORIDE SERPL-SCNC: 109 MMOL/L (ref 98–112)
CO2 SERPL-SCNC: 26 MMOL/L (ref 21–32)
CREAT BLD-MCNC: 0.87 MG/DL (ref 0.55–1.02)
CREAT UR-SCNC: 170 MG/DL
DEPRECATED RDW RBC AUTO: 41.2 FL (ref 35.1–46.3)
EOSINOPHIL # BLD AUTO: 0.07 X10(3) UL (ref 0–0.7)
EOSINOPHIL NFR BLD AUTO: 0.9 %
ERYTHROCYTE [DISTWIDTH] IN BLOOD BY AUTOMATED COUNT: 14.9 % (ref 11–15)
GLOBULIN PLAS-MCNC: 4.2 G/DL (ref 2.8–4.4)
GLUCOSE BLD-MCNC: 75 MG/DL (ref 70–99)
HCT VFR BLD AUTO: 35.1 % (ref 35–48)
HGB BLD-MCNC: 11 G/DL (ref 12–16)
IMM GRANULOCYTES # BLD AUTO: 0.05 X10(3) UL (ref 0–1)
IMM GRANULOCYTES NFR BLD: 0.6 %
LYMPHOCYTES # BLD AUTO: 1.86 X10(3) UL (ref 1–4)
LYMPHOCYTES NFR BLD AUTO: 22.8 %
M PROTEIN MFR SERPL ELPH: 6.9 G/DL (ref 6.4–8.2)
MCH RBC QN AUTO: 24.2 PG (ref 26–34)
MCHC RBC AUTO-ENTMCNC: 31.3 G/DL (ref 31–37)
MCV RBC AUTO: 77.3 FL (ref 80–100)
MONOCYTES # BLD AUTO: 0.86 X10(3) UL (ref 0.1–1)
MONOCYTES NFR BLD AUTO: 10.5 %
MORPHOLOGY: NORMAL
NEUTROPHILS # BLD AUTO: 5.31 X10 (3) UL (ref 1.5–7.7)
NEUTROPHILS # BLD AUTO: 5.31 X10(3) UL (ref 1.5–7.7)
NEUTROPHILS NFR BLD AUTO: 65 %
OSMOLALITY SERPL CALC.SUM OF ELEC: 286 MOSM/KG (ref 275–295)
PLATELET # BLD AUTO: 255 10(3)UL (ref 150–450)
PLATELET MORPHOLOGY: NORMAL
POTASSIUM SERPL-SCNC: 4.5 MMOL/L (ref 3.5–5.1)
PROT UR-MCNC: 30.9 MG/DL
PROT/CREAT UR-RTO: 0.18
RBC # BLD AUTO: 4.54 X10(6)UL (ref 3.8–5.3)
SODIUM SERPL-SCNC: 139 MMOL/L (ref 136–145)
WBC # BLD AUTO: 8.2 X10(3) UL (ref 4–11)

## 2020-07-12 PROCEDURE — 59025 FETAL NON-STRESS TEST: CPT | Performed by: OBSTETRICS & GYNECOLOGY

## 2020-07-13 ENCOUNTER — ROUTINE PRENATAL (OUTPATIENT)
Dept: OBGYN CLINIC | Facility: CLINIC | Age: 30
End: 2020-07-13
Payer: COMMERCIAL

## 2020-07-13 VITALS
BODY MASS INDEX: 40 KG/M2 | SYSTOLIC BLOOD PRESSURE: 131 MMHG | DIASTOLIC BLOOD PRESSURE: 87 MMHG | HEART RATE: 80 BPM | WEIGHT: 242 LBS

## 2020-07-13 DIAGNOSIS — Z34.03 ENCOUNTER FOR SUPERVISION OF NORMAL FIRST PREGNANCY IN THIRD TRIMESTER: Primary | ICD-10-CM

## 2020-07-13 LAB
APPEARANCE: CLEAR
MULTISTIX LOT#: 1044 NUMERIC
URINE-COLOR: YELLOW

## 2020-07-13 PROCEDURE — 81002 URINALYSIS NONAUTO W/O SCOPE: CPT | Performed by: OBSTETRICS & GYNECOLOGY

## 2020-07-13 PROCEDURE — 1111F DSCHRG MED/CURRENT MED MERGE: CPT | Performed by: OBSTETRICS & GYNECOLOGY

## 2020-07-13 NOTE — TELEPHONE ENCOUNTER
From: Donis Rios  To: Charlie Pires MD  Sent: 7/11/2020 4:37 PM CDT  Subject: Other    Hi Dr. Matthew Belle, I have been seeing spots like flashy lights in my vision for the 3rd time this week. I feel fine but not sure if it's something to be concerned about?

## 2020-07-13 NOTE — TRIAGE
[facility]      Triage Note    Ignacio Fletcher Patient Status:  Observation    1990 MRN [id number]   Location [address] Attending Juancho Nichols, [facility] Day # 0 PCP [facility].  Parveen, DO       Gravid Additional Comments       Reason for visit:pt arrive to triage via ambulatory with c/o BP at a high of 155/90s at home this evening. Pt denies HA and swelling. CMP and CBC wnl, urine for PCR 0.18.  NST reactive, serial BP all WNL and was relayed to Sharyn Carmichael

## 2020-07-13 NOTE — PROGRESS NOTES
Pt is a 30year old female admitted to TR1/TR1-A. Patient presents with:  Elevated BP     Pt is  39w6d intra-uterine pregnancy. History obtained, consents signed. Oriented to room, staff, and plan of care.

## 2020-07-13 NOTE — TELEPHONE ENCOUNTER
Paged on call with c/o elizabeth. Her sister is a Nurse and was with her and did two BP checks 20 minutes apart. First was 134/91 and second was 155/ 99. Good FM. I asked her to come to [facility] triage for evaluation. Rn notified.

## 2020-07-14 NOTE — TELEPHONE ENCOUNTER
Pt seen in [facility] triage on 7/12/2020 and had PN appt with ASHLEY on 7/13/2020.

## 2020-07-16 ENCOUNTER — ANESTHESIA (OUTPATIENT)
Dept: OBGYN UNIT | Facility: HOSPITAL | Age: 30
End: 2020-07-16
Payer: COMMERCIAL

## 2020-07-16 ENCOUNTER — TELEPHONE (OUTPATIENT)
Dept: OBGYN CLINIC | Facility: CLINIC | Age: 30
End: 2020-07-16

## 2020-07-16 ENCOUNTER — HOSPITAL ENCOUNTER (INPATIENT)
Facility: HOSPITAL | Age: 30
LOS: 3 days | Discharge: HOME | End: 2020-07-19
Attending: OBSTETRICS & GYNECOLOGY | Admitting: OBSTETRICS & GYNECOLOGY
Payer: COMMERCIAL

## 2020-07-16 ENCOUNTER — ANESTHESIA EVENT (OUTPATIENT)
Dept: OBGYN UNIT | Facility: HOSPITAL | Age: 30
End: 2020-07-16
Payer: COMMERCIAL

## 2020-07-16 PROBLEM — O42.90 AMNIOTIC FLUID LEAKING: Status: ACTIVE | Noted: 2020-07-16

## 2020-07-16 PROBLEM — O42.90 AMNIOTIC FLUID LEAKING (HCC): Status: ACTIVE | Noted: 2020-07-16

## 2020-07-16 LAB
ANTIBODY SCREEN: NEGATIVE
BASOPHILS # BLD AUTO: 0.03 X10(3) UL (ref 0–0.2)
BASOPHILS NFR BLD AUTO: 0.3 %
DEPRECATED RDW RBC AUTO: 41.6 FL (ref 35.1–46.3)
EOSINOPHIL # BLD AUTO: 0.06 X10(3) UL (ref 0–0.7)
EOSINOPHIL NFR BLD AUTO: 0.7 %
ERYTHROCYTE [DISTWIDTH] IN BLOOD BY AUTOMATED COUNT: 15.3 % (ref 11–15)
HCT VFR BLD AUTO: 35.6 % (ref 35–48)
HGB BLD-MCNC: 11.1 G/DL (ref 12–16)
IMM GRANULOCYTES # BLD AUTO: 0.06 X10(3) UL (ref 0–1)
IMM GRANULOCYTES NFR BLD: 0.7 %
LYMPHOCYTES # BLD AUTO: 1.9 X10(3) UL (ref 1–4)
LYMPHOCYTES NFR BLD AUTO: 22.1 %
MCH RBC QN AUTO: 24.2 PG (ref 26–34)
MCHC RBC AUTO-ENTMCNC: 31.2 G/DL (ref 31–37)
MCV RBC AUTO: 77.6 FL (ref 80–100)
MONOCYTES # BLD AUTO: 0.72 X10(3) UL (ref 0.1–1)
MONOCYTES NFR BLD AUTO: 8.4 %
NEUTROPHILS # BLD AUTO: 5.81 X10 (3) UL (ref 1.5–7.7)
NEUTROPHILS # BLD AUTO: 5.81 X10(3) UL (ref 1.5–7.7)
NEUTROPHILS NFR BLD AUTO: 67.8 %
PLATELET # BLD AUTO: 255 10(3)UL (ref 150–450)
RBC # BLD AUTO: 4.59 X10(6)UL (ref 3.8–5.3)
RH BLOOD TYPE: POSITIVE
SARS-COV-2 RNA RESP QL NAA+PROBE: NOT DETECTED
WBC # BLD AUTO: 8.6 X10(3) UL (ref 4–11)

## 2020-07-16 PROCEDURE — 59510 CESAREAN DELIVERY: CPT | Performed by: OBSTETRICS & GYNECOLOGY

## 2020-07-16 RX ORDER — AMMONIA INHALANTS 0.04 G/.3ML
0.3 INHALANT RESPIRATORY (INHALATION) AS NEEDED
Status: DISCONTINUED | OUTPATIENT
Start: 2020-07-16 | End: 2020-07-17 | Stop reason: HOSPADM

## 2020-07-16 RX ORDER — METOCLOPRAMIDE HYDROCHLORIDE 5 MG/ML
INJECTION INTRAMUSCULAR; INTRAVENOUS
Status: COMPLETED
Start: 2020-07-16 | End: 2020-07-16

## 2020-07-16 RX ORDER — METOCLOPRAMIDE 10 MG/1
10 TABLET ORAL ONCE
Status: DISCONTINUED | OUTPATIENT
Start: 2020-07-16 | End: 2020-07-17 | Stop reason: HOSPADM

## 2020-07-16 RX ORDER — DIPHENHYDRAMINE HYDROCHLORIDE 50 MG/ML
12.5 INJECTION INTRAMUSCULAR; INTRAVENOUS EVERY 4 HOURS PRN
Status: DISCONTINUED | OUTPATIENT
Start: 2020-07-16 | End: 2020-07-19

## 2020-07-16 RX ORDER — ONDANSETRON 2 MG/ML
4 INJECTION INTRAMUSCULAR; INTRAVENOUS EVERY 6 HOURS PRN
Status: DISCONTINUED | OUTPATIENT
Start: 2020-07-16 | End: 2020-07-17 | Stop reason: HOSPADM

## 2020-07-16 RX ORDER — LIDOCAINE HYDROCHLORIDE AND EPINEPHRINE 15; 5 MG/ML; UG/ML
INJECTION, SOLUTION EPIDURAL
Status: COMPLETED | OUTPATIENT
Start: 2020-07-16 | End: 2020-07-16

## 2020-07-16 RX ORDER — FAMOTIDINE 20 MG/1
20 TABLET ORAL ONCE
Status: DISCONTINUED | OUTPATIENT
Start: 2020-07-16 | End: 2020-07-17 | Stop reason: HOSPADM

## 2020-07-16 RX ORDER — TRISODIUM CITRATE DIHYDRATE AND CITRIC ACID MONOHYDRATE 500; 334 MG/5ML; MG/5ML
30 SOLUTION ORAL AS NEEDED
Status: COMPLETED | OUTPATIENT
Start: 2020-07-16 | End: 2020-07-16

## 2020-07-16 RX ORDER — DEXTROSE, SODIUM CHLORIDE, SODIUM LACTATE, POTASSIUM CHLORIDE, AND CALCIUM CHLORIDE 5; .6; .31; .03; .02 G/100ML; G/100ML; G/100ML; G/100ML; G/100ML
INJECTION, SOLUTION INTRAVENOUS CONTINUOUS
Status: DISCONTINUED | OUTPATIENT
Start: 2020-07-16 | End: 2020-07-19

## 2020-07-16 RX ORDER — FAMOTIDINE 10 MG/ML
INJECTION, SOLUTION INTRAVENOUS
Status: COMPLETED
Start: 2020-07-16 | End: 2020-07-16

## 2020-07-16 RX ORDER — CEFAZOLIN SODIUM/WATER 2 G/20 ML
SYRINGE (ML) INTRAVENOUS
Status: DISPENSED
Start: 2020-07-16 | End: 2020-07-17

## 2020-07-16 RX ORDER — ACETAMINOPHEN 500 MG
500 TABLET ORAL EVERY 6 HOURS PRN
Status: DISCONTINUED | OUTPATIENT
Start: 2020-07-16 | End: 2020-07-17 | Stop reason: HOSPADM

## 2020-07-16 RX ORDER — FAMOTIDINE 10 MG/ML
20 INJECTION, SOLUTION INTRAVENOUS ONCE
Status: DISCONTINUED | OUTPATIENT
Start: 2020-07-16 | End: 2020-07-17 | Stop reason: HOSPADM

## 2020-07-16 RX ORDER — BUPIVACAINE HYDROCHLORIDE 2.5 MG/ML
30 INJECTION, SOLUTION EPIDURAL; INFILTRATION; INTRACAUDAL ONCE
Status: DISCONTINUED | OUTPATIENT
Start: 2020-07-16 | End: 2020-07-19

## 2020-07-16 RX ORDER — CEFAZOLIN SODIUM/WATER 2 G/20 ML
2 SYRINGE (ML) INTRAVENOUS ONCE
Status: DISCONTINUED | OUTPATIENT
Start: 2020-07-16 | End: 2020-07-17 | Stop reason: HOSPADM

## 2020-07-16 RX ORDER — LIDOCAINE HYDROCHLORIDE 10 MG/ML
INJECTION, SOLUTION INFILTRATION; PERINEURAL
Status: COMPLETED | OUTPATIENT
Start: 2020-07-16 | End: 2020-07-16

## 2020-07-16 RX ORDER — MORPHINE SULFATE 1 MG/ML
INJECTION, SOLUTION EPIDURAL; INTRATHECAL; INTRAVENOUS AS NEEDED
Status: DISCONTINUED | OUTPATIENT
Start: 2020-07-16 | End: 2020-07-16 | Stop reason: SURG

## 2020-07-16 RX ORDER — EPHEDRINE SULFATE/0.9% NACL/PF 25 MG/5 ML
5 SYRINGE (ML) INTRAVENOUS AS NEEDED
Status: DISCONTINUED | OUTPATIENT
Start: 2020-07-16 | End: 2020-07-19

## 2020-07-16 RX ORDER — IBUPROFEN 600 MG/1
600 TABLET ORAL EVERY 6 HOURS PRN
Status: DISCONTINUED | OUTPATIENT
Start: 2020-07-16 | End: 2020-07-17 | Stop reason: HOSPADM

## 2020-07-16 RX ORDER — BUPIVACAINE HYDROCHLORIDE 2.5 MG/ML
INJECTION, SOLUTION EPIDURAL; INFILTRATION; INTRACAUDAL
Status: COMPLETED | OUTPATIENT
Start: 2020-07-16 | End: 2020-07-16

## 2020-07-16 RX ORDER — METOCLOPRAMIDE HYDROCHLORIDE 5 MG/ML
10 INJECTION INTRAMUSCULAR; INTRAVENOUS ONCE
Status: DISCONTINUED | OUTPATIENT
Start: 2020-07-16 | End: 2020-07-17 | Stop reason: HOSPADM

## 2020-07-16 RX ORDER — SODIUM CHLORIDE, SODIUM LACTATE, POTASSIUM CHLORIDE, CALCIUM CHLORIDE 600; 310; 30; 20 MG/100ML; MG/100ML; MG/100ML; MG/100ML
INJECTION, SOLUTION INTRAVENOUS AS NEEDED
Status: DISCONTINUED | OUTPATIENT
Start: 2020-07-16 | End: 2020-07-19

## 2020-07-16 RX ORDER — LIDOCAINE HYDROCHLORIDE AND EPINEPHRINE 20; 5 MG/ML; UG/ML
20 INJECTION, SOLUTION EPIDURAL; INFILTRATION; INTRACAUDAL; PERINEURAL ONCE
Status: COMPLETED | OUTPATIENT
Start: 2020-07-16 | End: 2020-07-16

## 2020-07-16 RX ORDER — TERBUTALINE SULFATE 1 MG/ML
0.25 INJECTION, SOLUTION SUBCUTANEOUS AS NEEDED
Status: DISCONTINUED | OUTPATIENT
Start: 2020-07-16 | End: 2020-07-17 | Stop reason: HOSPADM

## 2020-07-16 RX ORDER — LIDOCAINE HYDROCHLORIDE 10 MG/ML
30 INJECTION, SOLUTION EPIDURAL; INFILTRATION; INTRACAUDAL; PERINEURAL ONCE
Status: DISCONTINUED | OUTPATIENT
Start: 2020-07-16 | End: 2020-07-17 | Stop reason: HOSPADM

## 2020-07-16 RX ADMIN — MORPHINE SULFATE 3 MG: 1 INJECTION, SOLUTION EPIDURAL; INTRATHECAL; INTRAVENOUS at 22:09:00

## 2020-07-16 RX ADMIN — LIDOCAINE HYDROCHLORIDE AND EPINEPHRINE 20 ML: 20; 5 INJECTION, SOLUTION EPIDURAL; INFILTRATION; INTRACAUDAL; PERINEURAL at 22:09:00

## 2020-07-16 RX ADMIN — LIDOCAINE HYDROCHLORIDE 5 ML: 10 INJECTION, SOLUTION INFILTRATION; PERINEURAL at 16:23:00

## 2020-07-16 RX ADMIN — LIDOCAINE HYDROCHLORIDE AND EPINEPHRINE 5 ML: 15; 5 INJECTION, SOLUTION EPIDURAL at 16:25:00

## 2020-07-16 RX ADMIN — BUPIVACAINE HYDROCHLORIDE 10 ML: 2.5 INJECTION, SOLUTION EPIDURAL; INFILTRATION; INTRACAUDAL at 16:23:00

## 2020-07-16 RX ADMIN — LIDOCAINE HYDROCHLORIDE AND EPINEPHRINE 5 ML: 15; 5 INJECTION, SOLUTION EPIDURAL at 16:23:00

## 2020-07-16 RX ADMIN — ONDANSETRON 4 MG: 2 INJECTION INTRAMUSCULAR; INTRAVENOUS at 23:08:00

## 2020-07-16 NOTE — PROGRESS NOTES
Large amount of clear fluid on chux pad under patient. Able to swab scant trickle of fluid present from labia.

## 2020-07-16 NOTE — ANESTHESIA PREPROCEDURE EVALUATION
Anesthesia PreOp Note    HPI:     Servando Bailey is a 30year old female who presents for preoperative consultation requested by: * No surgeons listed *    Date of Surgery: 7/16/2020    * No procedures listed *  Indication: * No pre-op diagnosis entered * Lukas Jackson MD  Terbutaline Sulfate (BRETHINE) 1 MG/ML injection 0.25 mg, 0.25 mg, Subcutaneous, PRN, Lukas Jackson MD  Sod Citrate-Citric Acid [facility]) solution 30 mL, 30 mL, Oral, PRN, Lukas Jackson MD  ondansetron HCl [facility]) injection 4 mg, 4 mg, Jennifer Boston Graves disease     Social History    Socioeconomic History      Marital status:       Spouse name: Not on file      Number of children: Not on file      Years of education: Not on file      Highest education level: Not on file    Occupational Results   Component Value Date    WBC 8.6 07/16/2020    RBC 4.59 07/16/2020    HGB 11.1 (L) 07/16/2020    HCT 35.6 07/16/2020    MCV 77.6 (L) 07/16/2020    MCH 24.2 (L) 07/16/2020    MCHC 31.2 07/16/2020    RDW 15.3 (H) 07/16/2020    .0 07/16/2020

## 2020-07-16 NOTE — PROGRESS NOTES
Pt is a 30year old female admitted to TR2/TR2-A. Patient presents with:  R/o Rom: leaking since 630     Pt is  40w3d intra-uterine pregnancy. History obtained, consents signed. Oriented to room, staff, and plan of care.

## 2020-07-16 NOTE — PROGRESS NOTES
Pt is a 30year old female admitted to [place] [address]. Patient presents with:  R/o Rom: leaking since 630     Pt is  40w3d intra-uterine pregnancy. History obtained, consents signed. Oriented to room, staff, and plan of care.

## 2020-07-16 NOTE — H&P
6 [facility] Patient Status:  Observation    1990 MRN [id number]   Location [facility] Attending Jay Lemon MD   Hosp Day # 0 PCP Zaina Saini DO     Date of A Live Births   0 0 0 0        # Outcome Date GA Lbr Jose/2nd Weight Sex Delivery Anes PTL Lv   1 Current              Past Medical History:   Past Medical History:   Diagnosis Date   • Abdominal pain     Acute epiploic appendagitis   • Anemia    • Decorative SROM      PLAN:    1. Pitocin augmentation  2. GBS neg  3.  FHTs reassuring         [employer]  7/16/2020  10:46 AM

## 2020-07-16 NOTE — ANESTHESIA PROCEDURE NOTES
Labor Analgesia  Performed by: Daxa Daly MD  Authorized by: Daxa Daly MD       General Information and Staff    Start Time:  7/16/2020 4:24 PM  End Time:  7/16/2020 4:24 PM  Anesthesiologist:  Daxa Daly MD  Patient Location:  [facility]

## 2020-07-17 LAB
BASOPHILS # BLD AUTO: 0.03 X10(3) UL (ref 0–0.2)
BASOPHILS NFR BLD AUTO: 0.2 %
DEPRECATED RDW RBC AUTO: 42.7 FL (ref 35.1–46.3)
EOSINOPHIL # BLD AUTO: 0.01 X10(3) UL (ref 0–0.7)
EOSINOPHIL NFR BLD AUTO: 0.1 %
ERYTHROCYTE [DISTWIDTH] IN BLOOD BY AUTOMATED COUNT: 15.3 % (ref 11–15)
HCT VFR BLD AUTO: 29.3 % (ref 35–48)
HGB BLD-MCNC: 9.2 G/DL (ref 12–16)
IMM GRANULOCYTES # BLD AUTO: 0.08 X10(3) UL (ref 0–1)
IMM GRANULOCYTES NFR BLD: 0.6 %
LYMPHOCYTES # BLD AUTO: 1.49 X10(3) UL (ref 1–4)
LYMPHOCYTES NFR BLD AUTO: 10.5 %
MCH RBC QN AUTO: 24.5 PG (ref 26–34)
MCHC RBC AUTO-ENTMCNC: 31.4 G/DL (ref 31–37)
MCV RBC AUTO: 78.1 FL (ref 80–100)
MONOCYTES # BLD AUTO: 0.92 X10(3) UL (ref 0.1–1)
MONOCYTES NFR BLD AUTO: 6.5 %
NEUTROPHILS # BLD AUTO: 11.68 X10 (3) UL (ref 1.5–7.7)
NEUTROPHILS # BLD AUTO: 11.68 X10(3) UL (ref 1.5–7.7)
NEUTROPHILS NFR BLD AUTO: 82.1 %
PLATELET # BLD AUTO: 195 10(3)UL (ref 150–450)
RBC # BLD AUTO: 3.75 X10(6)UL (ref 3.8–5.3)
WBC # BLD AUTO: 14.2 X10(3) UL (ref 4–11)

## 2020-07-17 RX ORDER — AMMONIA INHALANTS 0.04 G/.3ML
0.3 INHALANT RESPIRATORY (INHALATION) AS NEEDED
Status: DISCONTINUED | OUTPATIENT
Start: 2020-07-17 | End: 2020-07-19

## 2020-07-17 RX ORDER — ONDANSETRON 2 MG/ML
4 INJECTION INTRAMUSCULAR; INTRAVENOUS ONCE AS NEEDED
Status: DISPENSED | OUTPATIENT
Start: 2020-07-17 | End: 2020-07-17

## 2020-07-17 RX ORDER — HYDROMORPHONE HYDROCHLORIDE 1 MG/ML
0.4 INJECTION, SOLUTION INTRAMUSCULAR; INTRAVENOUS; SUBCUTANEOUS EVERY 5 MIN PRN
Status: DISCONTINUED | OUTPATIENT
Start: 2020-07-17 | End: 2020-07-19

## 2020-07-17 RX ORDER — MORPHINE SULFATE 2 MG/ML
2 INJECTION, SOLUTION INTRAMUSCULAR; INTRAVENOUS EVERY 10 MIN PRN
Status: DISCONTINUED | OUTPATIENT
Start: 2020-07-17 | End: 2020-07-19

## 2020-07-17 RX ORDER — BISACODYL 10 MG
10 SUPPOSITORY, RECTAL RECTAL
Status: DISCONTINUED | OUTPATIENT
Start: 2020-07-17 | End: 2020-07-19

## 2020-07-17 RX ORDER — HYDROCODONE BITARTRATE AND ACETAMINOPHEN 5; 325 MG/1; MG/1
1 TABLET ORAL AS NEEDED
Status: COMPLETED | OUTPATIENT
Start: 2020-07-17 | End: 2020-07-18

## 2020-07-17 RX ORDER — PROCHLORPERAZINE EDISYLATE 5 MG/ML
5 INJECTION INTRAMUSCULAR; INTRAVENOUS ONCE AS NEEDED
Status: ACTIVE | OUTPATIENT
Start: 2020-07-17 | End: 2020-07-17

## 2020-07-17 RX ORDER — HALOPERIDOL 5 MG/ML
0.25 INJECTION INTRAMUSCULAR ONCE AS NEEDED
Status: ACTIVE | OUTPATIENT
Start: 2020-07-17 | End: 2020-07-17

## 2020-07-17 RX ORDER — KETOROLAC TROMETHAMINE 30 MG/ML
30 INJECTION, SOLUTION INTRAMUSCULAR; INTRAVENOUS EVERY 6 HOURS PRN
Status: COMPLETED | OUTPATIENT
Start: 2020-07-17 | End: 2020-07-17

## 2020-07-17 RX ORDER — DEXTROSE, SODIUM CHLORIDE, SODIUM LACTATE, POTASSIUM CHLORIDE, AND CALCIUM CHLORIDE 5; .6; .31; .03; .02 G/100ML; G/100ML; G/100ML; G/100ML; G/100ML
INJECTION, SOLUTION INTRAVENOUS CONTINUOUS
Status: DISCONTINUED | OUTPATIENT
Start: 2020-07-17 | End: 2020-07-19

## 2020-07-17 RX ORDER — HYDROMORPHONE HYDROCHLORIDE 1 MG/ML
0.6 INJECTION, SOLUTION INTRAMUSCULAR; INTRAVENOUS; SUBCUTANEOUS EVERY 5 MIN PRN
Status: DISCONTINUED | OUTPATIENT
Start: 2020-07-17 | End: 2020-07-19

## 2020-07-17 RX ORDER — HYDROMORPHONE HYDROCHLORIDE 1 MG/ML
0.2 INJECTION, SOLUTION INTRAMUSCULAR; INTRAVENOUS; SUBCUTANEOUS EVERY 5 MIN PRN
Status: DISCONTINUED | OUTPATIENT
Start: 2020-07-17 | End: 2020-07-19

## 2020-07-17 RX ORDER — MORPHINE SULFATE 10 MG/ML
6 INJECTION, SOLUTION INTRAMUSCULAR; INTRAVENOUS EVERY 10 MIN PRN
Status: DISCONTINUED | OUTPATIENT
Start: 2020-07-17 | End: 2020-07-19

## 2020-07-17 RX ORDER — NALOXONE HYDROCHLORIDE 0.4 MG/ML
80 INJECTION, SOLUTION INTRAMUSCULAR; INTRAVENOUS; SUBCUTANEOUS AS NEEDED
Status: ACTIVE | OUTPATIENT
Start: 2020-07-17 | End: 2020-07-17

## 2020-07-17 RX ORDER — MORPHINE SULFATE 4 MG/ML
4 INJECTION, SOLUTION INTRAMUSCULAR; INTRAVENOUS EVERY 10 MIN PRN
Status: DISCONTINUED | OUTPATIENT
Start: 2020-07-17 | End: 2020-07-19

## 2020-07-17 RX ORDER — POLYETHYLENE GLYCOL 3350 17 G/17G
17 POWDER, FOR SOLUTION ORAL DAILY PRN
Status: DISCONTINUED | OUTPATIENT
Start: 2020-07-17 | End: 2020-07-19

## 2020-07-17 RX ORDER — MELATONIN
325
Status: DISCONTINUED | OUTPATIENT
Start: 2020-07-17 | End: 2020-07-19

## 2020-07-17 RX ORDER — HYDROCODONE BITARTRATE AND ACETAMINOPHEN 5; 325 MG/1; MG/1
2 TABLET ORAL AS NEEDED
Status: COMPLETED | OUTPATIENT
Start: 2020-07-17 | End: 2020-07-18

## 2020-07-17 RX ORDER — HYDROCODONE BITARTRATE AND ACETAMINOPHEN 5; 325 MG/1; MG/1
1 TABLET ORAL EVERY 6 HOURS PRN
Status: DISCONTINUED | OUTPATIENT
Start: 2020-07-17 | End: 2020-07-19

## 2020-07-17 RX ORDER — CHOLECALCIFEROL (VITAMIN D3) 25 MCG
1 TABLET,CHEWABLE ORAL DAILY
Status: DISCONTINUED | OUTPATIENT
Start: 2020-07-17 | End: 2020-07-19

## 2020-07-17 RX ORDER — SODIUM PHOSPHATE, DIBASIC AND SODIUM PHOSPHATE, MONOBASIC 7; 19 G/133ML; G/133ML
1 ENEMA RECTAL ONCE AS NEEDED
Status: DISCONTINUED | OUTPATIENT
Start: 2020-07-17 | End: 2020-07-19

## 2020-07-17 RX ORDER — SIMETHICONE 80 MG
80 TABLET,CHEWABLE ORAL 3 TIMES DAILY PRN
Status: DISCONTINUED | OUTPATIENT
Start: 2020-07-17 | End: 2020-07-19

## 2020-07-17 RX ORDER — DOCUSATE SODIUM 100 MG/1
100 CAPSULE, LIQUID FILLED ORAL
Status: DISCONTINUED | OUTPATIENT
Start: 2020-07-17 | End: 2020-07-19

## 2020-07-17 RX ORDER — ONDANSETRON 2 MG/ML
4 INJECTION INTRAMUSCULAR; INTRAVENOUS EVERY 6 HOURS PRN
Status: DISCONTINUED | OUTPATIENT
Start: 2020-07-17 | End: 2020-07-19

## 2020-07-17 RX ORDER — IBUPROFEN 600 MG/1
600 TABLET ORAL EVERY 6 HOURS
Status: DISCONTINUED | OUTPATIENT
Start: 2020-07-19 | End: 2020-07-18

## 2020-07-17 RX ORDER — SODIUM CHLORIDE, SODIUM LACTATE, POTASSIUM CHLORIDE, CALCIUM CHLORIDE 600; 310; 30; 20 MG/100ML; MG/100ML; MG/100ML; MG/100ML
INJECTION, SOLUTION INTRAVENOUS CONTINUOUS
Status: DISCONTINUED | OUTPATIENT
Start: 2020-07-17 | End: 2020-07-19

## 2020-07-17 NOTE — PROGRESS NOTES
Feeling mild pressure still.    Cx still 4/80/-2; caput/molding noted  FHT with some early vs variable decels with adequate UCs  Plan for primary CS for FTP  Risks of surgery reviewed including bleeding, infection, injury, need for transfusion and additiona

## 2020-07-17 NOTE — PROGRESS NOTES
7/16/2020, 7:15 PM    Subjective:  Patient seen at 630; comfortable.   No pressure    Objective:   07/16/20 1820 07/16/20  1830 07/16/20 1840 07/16/20  1850   BP:  126/83     Pulse: 71 74 72 78   Temp:       TempSrc:       SpO2: 100% 100% 100% 99%   Weigh

## 2020-07-17 NOTE — PROGRESS NOTES
Spoke to Dr. Francisca Hassan regarding her low output. He is okay with me givbing her a 500cc bolus.

## 2020-07-17 NOTE — LACTATION NOTE
This note was copied from a baby's chart.   LACTATION NOTE - INFANT    Evaluation Type  Evaluation Type: Inpatient    Problems & Assessment  Problems Diagnosed or Identified: Latch difficulty  Infant Assessment: Hunger cues present  Muscle tone: Appropriate

## 2020-07-17 NOTE — OPERATIVE REPORT
Operative Report for  Section:    Date: 20  Patient: Carmen Prasad  : 1990  MRN: [id number]    Preoperative Diagnosis: Intrauterine Pregnancy 40w3d, SROM, Failure to progress  Postoperative Diagnosis: same  Procedure(s): Primary Cesa the underlying layer of fascia. The fascia was then incised in the midline and the incision extended laterally with the novak scissors.   The superior aspect of the fascial incision was then grasped with Kocher clamps, elevated, and the underlying rectus mu out.  The subcutaneous adipose layer was approximated with Plain gut in a running fashion. The skin was closed with staples. The patient tolerated the procedure well. Sponge, lap, and needle counts were correct.   Two grams of Ancef and Azithromycin

## 2020-07-17 NOTE — DISCHARGE SUMMARY
[facility]    Discharge Summary    Tiara Pedroza Patient Status:  Inpatient    1990 MRN [id number]   Location [address] Attending Zack Goldberg, [address] [facility] Day # 0       Delivering OB Clinician:

## 2020-07-17 NOTE — PROGRESS NOTES
[facility]    OB/Gyne Post  Section Progress Note      Radha Bains Patient Status:  Inpatient    1990 MRN [id number]   Location [facility] 3SE Attending Dereck Jules, [address] [facility] Day # 1 PCP Sera Shaikh.  Parveen, 8.4 %    Eosinophil % 0.7 %    Basophil % 0.3 %    Immature Granulocyte % 0.7 %   ABORH (BLOOD TYPE)    Collection Time: 07/16/20 12:04 PM   Result Value Ref Range    ABO BLOOD TYPE A     RH BLOOD TYPE Positive    ANTIBODY SCREEN    Collection Time: 07/16/

## 2020-07-17 NOTE — PROGRESS NOTES
7/16/2020, 8:51 PM    Subjective:  Patient is resting comfortably; mild pressure    Objective:   07/16/20 1926 07/16/20 1930 07/16/20 2000 07/16/20 2030   BP:  116/64 116/63 131/70   Pulse:  75 83 75   Temp: 98.1 °F (36.7 °C)      TempSrc: Oral      Sp

## 2020-07-17 NOTE — ANESTHESIA POSTPROCEDURE EVALUATION
Patient: Jhony Watkins    Procedure Summary     Date:  07/16/20 Room / Location:      Anesthesia Start:  1623 Anesthesia Stop:      Procedure:  LABOR ANALGESIA Diagnosis:      Scheduled Providers:   Anesthesiologist:  Jasmine Bautista MD    Anesthesia Ty

## 2020-07-17 NOTE — PROGRESS NOTES
pericare and jones care done. Patient is now sitting in the chair with no complaints. Urine output looks to be increasing and urine is now yellow and not james. Patient is ordering lunch and RN will asses jones after lunch to see if we can remove jones.  Pa

## 2020-07-17 NOTE — PROGRESS NOTES
Patient transferred to mother/baby room 363 per cart in stable condition with baby and personal belongings. Accompanied by  and staff. Report given to Judy Cazares mother/baby RN.

## 2020-07-17 NOTE — LACTATION NOTE
LACTATION NOTE - MOTHER      Evaluation Type: Inpatient    Problems identified  Problems identified: Knowledge deficit    Maternal history  Maternal history: Hypothyroid; Hyperthyroid  Other/comment: Graves, postablative hypothyroidism    Breastfeeding goal

## 2020-07-18 RX ORDER — IBUPROFEN 600 MG/1
600 TABLET ORAL EVERY 6 HOURS PRN
Status: DISCONTINUED | OUTPATIENT
Start: 2020-07-18 | End: 2020-07-19

## 2020-07-18 NOTE — PROGRESS NOTES
[facility] - [facility]    OB/Gyne Post  Section Progress Note      Davidson Wagner Patient Status:  Inpatient    1990 MRN [id number]   Location [facility] 3SE Attending Lele Ortiz, [address] [facility] Day # 2 PCP [address] [facility] [address]

## 2020-07-19 VITALS
RESPIRATION RATE: 14 BRPM | SYSTOLIC BLOOD PRESSURE: 128 MMHG | WEIGHT: 242 LBS | HEART RATE: 74 BPM | DIASTOLIC BLOOD PRESSURE: 77 MMHG | OXYGEN SATURATION: 99 % | BODY MASS INDEX: 40.32 KG/M2 | TEMPERATURE: 98 F | HEIGHT: 65 IN

## 2020-07-19 RX ORDER — HYDROCODONE BITARTRATE AND ACETAMINOPHEN 5; 325 MG/1; MG/1
1 TABLET ORAL EVERY 6 HOURS PRN
Qty: 16 TABLET | Refills: 0 | Status: SHIPPED | OUTPATIENT
Start: 2020-07-19 | End: 2020-10-05

## 2020-07-19 RX ORDER — PSEUDOEPHEDRINE HCL 30 MG
100 TABLET ORAL 2 TIMES DAILY PRN
Qty: 60 CAPSULE | Refills: 0 | Status: SHIPPED | OUTPATIENT
Start: 2020-07-19 | End: 2020-10-05

## 2020-07-19 RX ORDER — IBUPROFEN 600 MG/1
600 TABLET ORAL EVERY 6 HOURS PRN
Qty: 60 TABLET | Refills: 0 | Status: SHIPPED | OUTPATIENT
Start: 2020-07-19 | End: 2020-10-05

## 2020-07-19 NOTE — DISCHARGE SUMMARY
[facility] - [facility]    [place] Discharge Summary    Yolis Ernst Patient Status:  Inpatient    1990 MRN [id number]   Location [facility] 3SE Attending Yao Sin, [address] [facility] Day # 3 PCP   Lydia Colby MD     Date normal to inspection, normal respiratory rate and clear to auscultation bilaterally  Cardiac: Regular rate and rhythm and no murmur  Abdominal: soft, non distended, no hepatosplenomegaly, no masses, normal bowel sounds and anus patent  Genitourinary:normal this appointment. After having a baby, your body may be very tired. It can take time to recover from a delivery. Please remember this and call if you have any questions or concerns before your 6 week appointment.      You may require an incision check and s isn't relieved with medicine.   · Burning, pain, red streaks, or lumpy areas in your breasts that may be accompanied by flu-like symptoms  · Nausea or vomiting  · Dizziness or fainting  · Feelings of extreme sadness or anxiety, or a feeling that you don’t w

## 2020-07-19 NOTE — PLAN OF CARE
Problem: BIRTH - VAGINAL/ SECTION  Goal: Fetal and maternal status remain reassuring during the birth process  Description  INTERVENTIONS:  - Monitor vital signs  - Monitor fetal heart rate  - Monitor uterine activity  - Monitor labor progression ulcer development  - Assess and document skin integrity  - Monitor for areas of redness and/or skin breakdown  - Initiate interventions, skin care algorithm/standards of care as needed  Outcome: Completed  Goal: Incision(s), wounds(s) or drain site(s) heal hour after birth. - Monitor effectiveness of current breast feeding efforts. - Assess support systems available to mother/family.  - Identify cultural beliefs/practices regarding lactation, letdown techniques, maternal food preferences.   - Assess mother bonding  Description  INTERVENTIONS:  - Assess caregiver- interactions. - Assess caregiver's emotional status and coping mechanisms. - Encourage caregiver to participate in  daily care. - Assess support systems available to mother/family. lab values  - Obtain nutritional consult as needed  - Evaluate psychosocial factors contributing to over-consumption  Outcome: Completed

## 2020-07-19 NOTE — PROGRESS NOTES
Post-Partum Note   7/19/2020, 7:25 AM    Subjective:  Patient doing well. Pain is well controlled. She is ambulating without lightheadedness or dizziness. Denies fevers or chills. Denies SOB, CP. She feels well and is walking the room.      Objective:   07/

## 2020-07-21 ENCOUNTER — TELEPHONE (OUTPATIENT)
Dept: OBGYN CLINIC | Facility: CLINIC | Age: 30
End: 2020-07-21

## 2020-07-21 NOTE — TELEPHONE ENCOUNTER
LMTCB    CSS- when pt calls back please assist pt with scheduling staple removal for 7/23/2020 in the afternoon when FERNANDO is in the office. Thank you!

## 2020-07-22 ENCOUNTER — OFFICE VISIT (OUTPATIENT)
Dept: FAMILY MEDICINE CLINIC | Facility: CLINIC | Age: 30
End: 2020-07-22
Payer: COMMERCIAL

## 2020-07-22 VITALS
HEART RATE: 97 BPM | BODY MASS INDEX: 37.65 KG/M2 | OXYGEN SATURATION: 98 % | WEIGHT: 226 LBS | SYSTOLIC BLOOD PRESSURE: 118 MMHG | DIASTOLIC BLOOD PRESSURE: 80 MMHG | HEIGHT: 65 IN

## 2020-07-22 DIAGNOSIS — Z00.00 HEALTHCARE MAINTENANCE: ICD-10-CM

## 2020-07-22 DIAGNOSIS — E89.0 HYPOTHYROIDISM, POSTABLATIVE: ICD-10-CM

## 2020-07-22 DIAGNOSIS — Z00.00 WELLNESS EXAMINATION: Primary | ICD-10-CM

## 2020-07-22 PROCEDURE — 3079F DIAST BP 80-89 MM HG: CPT | Performed by: FAMILY MEDICINE

## 2020-07-22 PROCEDURE — 99385 PREV VISIT NEW AGE 18-39: CPT | Performed by: FAMILY MEDICINE

## 2020-07-22 PROCEDURE — 3008F BODY MASS INDEX DOCD: CPT | Performed by: FAMILY MEDICINE

## 2020-07-22 PROCEDURE — 3074F SYST BP LT 130 MM HG: CPT | Performed by: FAMILY MEDICINE

## 2020-07-24 ENCOUNTER — NURSE ONLY (OUTPATIENT)
Dept: OBGYN CLINIC | Facility: CLINIC | Age: 30
End: 2020-07-24
Payer: COMMERCIAL

## 2020-07-24 VITALS
BODY MASS INDEX: 37 KG/M2 | DIASTOLIC BLOOD PRESSURE: 84 MMHG | HEART RATE: 83 BPM | WEIGHT: 219.81 LBS | SYSTOLIC BLOOD PRESSURE: 126 MMHG

## 2020-07-24 DIAGNOSIS — Z48.02 ENCOUNTER FOR STAPLE REMOVAL: Primary | ICD-10-CM

## 2020-07-24 PROCEDURE — 3079F DIAST BP 80-89 MM HG: CPT | Performed by: OBSTETRICS & GYNECOLOGY

## 2020-07-24 PROCEDURE — 3074F SYST BP LT 130 MM HG: CPT | Performed by: OBSTETRICS & GYNECOLOGY

## 2020-07-24 NOTE — PROGRESS NOTES
Pt seen today for staple removal. Pt denied any problems with the incision. Pt had 22 staples removed. Tolerated well. Steri-strips were applied with benzoin tincture. Pt was instructed to call with any problems including fever, drainage, odor or pain.  Pt

## 2020-07-28 ENCOUNTER — TELEPHONE (OUTPATIENT)
Dept: OBGYN UNIT | Facility: HOSPITAL | Age: 30
End: 2020-07-28

## 2020-08-02 NOTE — PROGRESS NOTES
CC: Annual Physical Exam    HPI:   Carmen Prasad is a 30year old female who presents for a complete physical exam.    HCM  -Diet:  Well-balanced.   -Exercise irregularly  -Mental Health: denies any depression or anxiety sx  -Skin care:  no concerning lesi stroma. · Three-vessel umbilical cord, no funisitis identified. B. Left paratubal cyst:  · Benign simple serous cyst.        Embedded Images      Clinical Information       Arrest Of Dilatation.           Gross Description       Specimen A is submitted 4. 59 3.80 - 5.30 x10(6)uL    HGB 11.1 (L) 12.0 - 16.0 g/dL    HCT 35.6 35.0 - 48.0 %    MCV 77.6 (L) 80.0 - 100.0 fL    MCH 24.2 (L) 26.0 - 34.0 pg    MCHC 31.2 31.0 - 37.0 g/dL    RDW-SD 41.6 35.1 - 46.3 fL    RDW 15.3 (H) 11.0 - 15.0 %    .0 150. 0 tablet by mouth every 6 (six) hours as needed. (Patient not taking: Reported on 7/24/2020 ) 16 tablet 0   • ibuprofen 600 MG Oral Tab Take 1 tablet (600 mg total) by mouth every 6 (six) hours as needed.  60 tablet 0   • Levothyroxine Sodium 137 MCG Oral Tab encounter: 65\". Weight as of this encounter: 226 lb (102.5 kg). Wt Readings from Last 3 Encounters:  07/24/20 : 219 lb 12.8 oz (99.7 kg)  07/22/20 : 226 lb (102.5 kg)  07/16/20 : 242 lb (109.8 kg)      Physical Exam   Vitals reviewed.    Constitutiona due on 12/19/2019      The patient indicates understanding of these issues and agrees to the plan. Return if symptoms worsen or fail to improve. Reasurrance and education provided. All questions answered. Red flags/ ER precautions discussed.

## 2020-08-13 ENCOUNTER — APPOINTMENT (OUTPATIENT)
Dept: LAB | Facility: HOSPITAL | Age: 30
End: 2020-08-13
Attending: INTERNAL MEDICINE
Payer: COMMERCIAL

## 2020-08-13 DIAGNOSIS — E06.3 HYPOTHYROIDISM DUE TO HASHIMOTO'S THYROIDITIS: ICD-10-CM

## 2020-08-13 DIAGNOSIS — Z00.00 HEALTHCARE MAINTENANCE: ICD-10-CM

## 2020-08-13 DIAGNOSIS — E03.8 HYPOTHYROIDISM DUE TO HASHIMOTO'S THYROIDITIS: ICD-10-CM

## 2020-08-13 LAB
ALBUMIN SERPL-MCNC: 3.7 G/DL (ref 3.4–5)
ALBUMIN/GLOB SERPL: 0.9 {RATIO} (ref 1–2)
ALP LIVER SERPL-CCNC: 82 U/L (ref 37–98)
ALT SERPL-CCNC: 77 U/L (ref 13–56)
ANION GAP SERPL CALC-SCNC: 8 MMOL/L (ref 0–18)
AST SERPL-CCNC: 47 U/L (ref 15–37)
BILIRUB SERPL-MCNC: 0.9 MG/DL (ref 0.1–2)
BUN BLD-MCNC: 14 MG/DL (ref 7–18)
BUN/CREAT SERPL: 18.2 (ref 10–20)
CALCIUM BLD-MCNC: 8.8 MG/DL (ref 8.5–10.1)
CHLORIDE SERPL-SCNC: 106 MMOL/L (ref 98–112)
CHOLEST SMN-MCNC: 180 MG/DL (ref ?–200)
CO2 SERPL-SCNC: 25 MMOL/L (ref 21–32)
CREAT BLD-MCNC: 0.77 MG/DL (ref 0.55–1.02)
DEPRECATED RDW RBC AUTO: 46.1 FL (ref 35.1–46.3)
ERYTHROCYTE [DISTWIDTH] IN BLOOD BY AUTOMATED COUNT: 16.3 % (ref 11–15)
EST. AVERAGE GLUCOSE BLD GHB EST-MCNC: 105 MG/DL (ref 68–126)
GLOBULIN PLAS-MCNC: 4.3 G/DL (ref 2.8–4.4)
GLUCOSE BLD-MCNC: 78 MG/DL (ref 70–99)
HBA1C MFR BLD HPLC: 5.3 % (ref ?–5.7)
HCT VFR BLD AUTO: 41.7 % (ref 35–48)
HDLC SERPL-MCNC: 46 MG/DL (ref 40–59)
HGB BLD-MCNC: 12.3 G/DL (ref 12–16)
LDLC SERPL CALC-MCNC: 104 MG/DL (ref ?–100)
M PROTEIN MFR SERPL ELPH: 8 G/DL (ref 6.4–8.2)
MCH RBC QN AUTO: 23.1 PG (ref 26–34)
MCHC RBC AUTO-ENTMCNC: 29.5 G/DL (ref 31–37)
MCV RBC AUTO: 78.4 FL (ref 80–100)
NONHDLC SERPL-MCNC: 134 MG/DL (ref ?–130)
OSMOLALITY SERPL CALC.SUM OF ELEC: 287 MOSM/KG (ref 275–295)
PATIENT FASTING Y/N/NP: NO
PATIENT FASTING Y/N/NP: NO
PLATELET # BLD AUTO: 241 10(3)UL (ref 150–450)
POTASSIUM SERPL-SCNC: 3.7 MMOL/L (ref 3.5–5.1)
RBC # BLD AUTO: 5.32 X10(6)UL (ref 3.8–5.3)
SODIUM SERPL-SCNC: 139 MMOL/L (ref 136–145)
T4 FREE SERPL-MCNC: 1.4 NG/DL (ref 0.8–1.7)
TRIGL SERPL-MCNC: 150 MG/DL (ref 30–149)
TSI SER-ACNC: 0.18 MIU/ML (ref 0.36–3.74)
VLDLC SERPL CALC-MCNC: 30 MG/DL (ref 0–30)
WBC # BLD AUTO: 7.1 X10(3) UL (ref 4–11)

## 2020-08-13 PROCEDURE — 36415 COLL VENOUS BLD VENIPUNCTURE: CPT

## 2020-08-13 PROCEDURE — 80053 COMPREHEN METABOLIC PANEL: CPT

## 2020-08-13 PROCEDURE — 85027 COMPLETE CBC AUTOMATED: CPT

## 2020-08-13 PROCEDURE — 83036 HEMOGLOBIN GLYCOSYLATED A1C: CPT

## 2020-08-13 PROCEDURE — 80061 LIPID PANEL: CPT

## 2020-08-13 PROCEDURE — 84443 ASSAY THYROID STIM HORMONE: CPT

## 2020-08-13 PROCEDURE — 84439 ASSAY OF FREE THYROXINE: CPT

## 2020-08-24 ENCOUNTER — TELEPHONE (OUTPATIENT)
Dept: FAMILY MEDICINE CLINIC | Facility: CLINIC | Age: 30
End: 2020-08-24

## 2020-08-24 DIAGNOSIS — R74.01 TRANSAMINITIS: Primary | ICD-10-CM

## 2020-08-24 NOTE — TELEPHONE ENCOUNTER
----- Message from Filomena Bowser MD sent at 8/24/2020 10:38 AM CDT -----  Please let her know her labs show:  Your cholesterol panel is a bit elevated. This is reversible with a low fat diet and exercise. Your liver enzymes are mildly elevated.

## 2020-08-28 ENCOUNTER — POSTPARTUM (OUTPATIENT)
Dept: OBGYN CLINIC | Facility: CLINIC | Age: 30
End: 2020-08-28
Payer: COMMERCIAL

## 2020-08-28 VITALS
DIASTOLIC BLOOD PRESSURE: 72 MMHG | SYSTOLIC BLOOD PRESSURE: 106 MMHG | BODY MASS INDEX: 36 KG/M2 | WEIGHT: 217.81 LBS | HEART RATE: 80 BPM

## 2020-08-28 DIAGNOSIS — N89.8 VAGINAL DISCHARGE: ICD-10-CM

## 2020-08-28 PROCEDURE — 3074F SYST BP LT 130 MM HG: CPT | Performed by: OBSTETRICS & GYNECOLOGY

## 2020-08-28 PROCEDURE — 3078F DIAST BP <80 MM HG: CPT | Performed by: OBSTETRICS & GYNECOLOGY

## 2020-08-28 NOTE — H&P
YAAKOV Moses is a 30year old female  here for 6 week post-partum visit. Patient delivered a  female infant on 20 via primary CSx. Patient desires IUD for contraception. Patient is formula feeding.    Patient denies symptoms of depres 0  •  ibuprofen 600 MG Oral Tab, Take 1 tablet (600 mg total) by mouth every 6 (six) hours as needed.  (Patient not taking: Reported on 8/28/2020 ), Disp: 60 tablet, Rfl: 0  •  prenatal multivitamin plus DHA 27-0.8-228 MG Oral Cap, Take 1 capsule by mouth d

## 2020-08-30 LAB — TRICHOMONAS SCREEN: NEGATIVE

## 2020-09-02 ENCOUNTER — TELEPHONE (OUTPATIENT)
Dept: OBGYN CLINIC | Facility: CLINIC | Age: 30
End: 2020-09-02

## 2020-09-02 RX ORDER — MISOPROSTOL 200 UG/1
400 TABLET ORAL ONCE
Qty: 2 TABLET | Refills: 0 | Status: SHIPPED | OUTPATIENT
Start: 2020-09-02 | End: 2020-09-02

## 2020-09-02 NOTE — TELEPHONE ENCOUNTER
Pt had PP appt with FERNANDO on 8/28 and discussed BC option and decided on IUD. Pt stated she started her period yesterday.  Pt scheduled her IUD insertion with FERNANDO tomorrow at 10:40am. Pt informed she will need to take 2 tablets of cyctotec tonight before bed

## 2020-09-02 NOTE — TELEPHONE ENCOUNTER
Patient is requesting IUD insert. Was instructed by Dr Giorgi Portillo to call on the first day of her period. Menses started 9/1 in the evening.     Please advise

## 2020-09-03 ENCOUNTER — OFFICE VISIT (OUTPATIENT)
Dept: OBGYN CLINIC | Facility: CLINIC | Age: 30
End: 2020-09-03
Payer: COMMERCIAL

## 2020-09-03 VITALS
SYSTOLIC BLOOD PRESSURE: 106 MMHG | HEART RATE: 73 BPM | WEIGHT: 217.81 LBS | DIASTOLIC BLOOD PRESSURE: 73 MMHG | BODY MASS INDEX: 36 KG/M2

## 2020-09-03 DIAGNOSIS — Z30.430 ENCOUNTER FOR IUD INSERTION: Primary | ICD-10-CM

## 2020-09-03 PROCEDURE — 58300 INSERT INTRAUTERINE DEVICE: CPT | Performed by: OBSTETRICS & GYNECOLOGY

## 2020-09-03 PROCEDURE — 3078F DIAST BP <80 MM HG: CPT | Performed by: OBSTETRICS & GYNECOLOGY

## 2020-09-03 PROCEDURE — 3074F SYST BP LT 130 MM HG: CPT | Performed by: OBSTETRICS & GYNECOLOGY

## 2020-09-03 NOTE — PROCEDURES
IUD Insertion     Pregnancy Results: negative from urine test   Birth control method(s) used: None. LMP: n/a--postpartum  Consent signed.   Procedure discussed with the patient in detail including indication, risks, benefits, alternatives and complication

## 2020-10-05 ENCOUNTER — OFFICE VISIT (OUTPATIENT)
Dept: OBGYN CLINIC | Facility: CLINIC | Age: 30
End: 2020-10-05
Payer: MEDICAID

## 2020-10-05 VITALS
HEART RATE: 76 BPM | DIASTOLIC BLOOD PRESSURE: 69 MMHG | SYSTOLIC BLOOD PRESSURE: 102 MMHG | BODY MASS INDEX: 37 KG/M2 | WEIGHT: 221 LBS

## 2020-10-05 DIAGNOSIS — Z30.431 IUD CHECK UP: Primary | ICD-10-CM

## 2020-10-05 PROBLEM — O99.280 HYPOTHYROIDISM AFFECTING PREGNANCY, ANTEPARTUM (HCC): Status: RESOLVED | Noted: 2019-12-10 | Resolved: 2020-10-05

## 2020-10-05 PROBLEM — O42.90 AMNIOTIC FLUID LEAKING (HCC): Status: RESOLVED | Noted: 2020-07-16 | Resolved: 2020-10-05

## 2020-10-05 PROBLEM — E03.9 HYPOTHYROIDISM AFFECTING PREGNANCY, ANTEPARTUM (HCC): Status: RESOLVED | Noted: 2019-12-10 | Resolved: 2020-10-05

## 2020-10-05 PROBLEM — O99.280 HYPOTHYROIDISM AFFECTING PREGNANCY, ANTEPARTUM: Status: RESOLVED | Noted: 2019-12-10 | Resolved: 2020-10-05

## 2020-10-05 PROBLEM — E03.9 HYPOTHYROIDISM AFFECTING PREGNANCY, ANTEPARTUM: Status: RESOLVED | Noted: 2019-12-10 | Resolved: 2020-10-05

## 2020-10-05 PROBLEM — O42.90 AMNIOTIC FLUID LEAKING: Status: RESOLVED | Noted: 2020-07-16 | Resolved: 2020-10-05

## 2020-10-05 PROCEDURE — 3074F SYST BP LT 130 MM HG: CPT | Performed by: OBSTETRICS & GYNECOLOGY

## 2020-10-05 PROCEDURE — 3078F DIAST BP <80 MM HG: CPT | Performed by: OBSTETRICS & GYNECOLOGY

## 2020-10-05 PROCEDURE — 99213 OFFICE O/P EST LOW 20 MIN: CPT | Performed by: OBSTETRICS & GYNECOLOGY

## 2020-10-05 NOTE — H&P
HPI:  The patient is a 31 yo F here for IUD check. No major issues. Mild cramping x 1 week after placement. Irregular spotting since. Had IC recently and  felt strings.         Reviewed medical and surgical history below       OBSTETRICS HISTORY: status: Not on file      Intimate partner violence        Fear of current or ex partner: Not on file        Emotionally abused: Not on file        Physically abused: Not on file        Forced sexual activity: Not on file    Other Topics      Concerns: chest pain or palpitations  Respiratory:  denies shortness of breath  Gastrointestinal:  denies heartburn, abdominal pain, diarrhea or constipation  Genitourinary:  denies dysuria, incontinence, abnormal vaginal discharge, vaginal itching  Musculoskeletal:

## 2020-10-14 DIAGNOSIS — E03.8 HYPOTHYROIDISM DUE TO HASHIMOTO'S THYROIDITIS: Primary | ICD-10-CM

## 2020-10-14 DIAGNOSIS — E06.3 HYPOTHYROIDISM DUE TO HASHIMOTO'S THYROIDITIS: Primary | ICD-10-CM

## 2020-10-15 RX ORDER — LEVOTHYROXINE SODIUM 137 UG/1
TABLET ORAL
Qty: 30 TABLET | Refills: 3 | Status: SHIPPED | OUTPATIENT
Start: 2020-10-15 | End: 2021-03-06

## 2020-10-15 NOTE — TELEPHONE ENCOUNTER
LOV 10/21/19  RTC 1 year  No upcoming appt. Handup msg sent. Pended 90 day supply for your review.     Thyroid:    Lab Results   Component Value Date    TSH 0.176 (L) 08/13/2020    TSH 3.320 06/15/2020    TSH 3.010 04/23/2020    T4F 1.4 08/13/2020    T4F

## 2020-10-16 ENCOUNTER — OFFICE VISIT (OUTPATIENT)
Dept: ENDOCRINOLOGY CLINIC | Facility: CLINIC | Age: 30
End: 2020-10-16
Payer: MEDICAID

## 2020-10-16 VITALS
BODY MASS INDEX: 37 KG/M2 | HEART RATE: 69 BPM | DIASTOLIC BLOOD PRESSURE: 79 MMHG | WEIGHT: 221 LBS | SYSTOLIC BLOOD PRESSURE: 119 MMHG

## 2020-10-16 DIAGNOSIS — E89.0 POSTABLATIVE HYPOTHYROIDISM: Primary | ICD-10-CM

## 2020-10-16 PROCEDURE — 99213 OFFICE O/P EST LOW 20 MIN: CPT | Performed by: INTERNAL MEDICINE

## 2020-10-16 PROCEDURE — 3078F DIAST BP <80 MM HG: CPT | Performed by: INTERNAL MEDICINE

## 2020-10-16 PROCEDURE — 3074F SYST BP LT 130 MM HG: CPT | Performed by: INTERNAL MEDICINE

## 2020-10-16 NOTE — PROGRESS NOTES
Name: Salvadorandreea Pedroza  Date: 10/16/2020    Referring Physician: No ref. provider found    Patient presents with: Follow - Up: Pt here to follow up on thyroid medication, currently using Levothyroxine 137mcg.       HISTORY OF PRESENT ILLNESS   Tiara Pedroza i urinating  Psychiatric:  no acute distress, anxiety  or depression  Skin: normal moisturized skin    Medications:     Current Outpatient Medications:   •  LEVOTHYROXINE SODIUM 137 MCG Oral Tab, TAKE 1 TABLET BY MOUTH BEFORE BREAKFAST, Disp: 30 tablet, Rfl: intact  Psychiatric:  oriented to time, self, and place  Nutritional:  no abnormal weight gain or loss    ASSESSMENT/PLAN:    1.  Hypothyroidsim  -Diagnosed with Graves Disease 5 years ago  -Now developed postablative hypothyroidism  -Continue LT4 137mcg PO

## 2020-12-30 ENCOUNTER — LAB ENCOUNTER (OUTPATIENT)
Dept: LAB | Facility: HOSPITAL | Age: 30
End: 2020-12-30
Attending: INTERNAL MEDICINE
Payer: MEDICAID

## 2020-12-30 ENCOUNTER — OFFICE VISIT (OUTPATIENT)
Dept: OBGYN CLINIC | Facility: CLINIC | Age: 30
End: 2020-12-30
Payer: COMMERCIAL

## 2020-12-30 VITALS
DIASTOLIC BLOOD PRESSURE: 74 MMHG | BODY MASS INDEX: 38 KG/M2 | HEART RATE: 91 BPM | SYSTOLIC BLOOD PRESSURE: 111 MMHG | WEIGHT: 229 LBS

## 2020-12-30 DIAGNOSIS — E89.0 POSTABLATIVE HYPOTHYROIDISM: ICD-10-CM

## 2020-12-30 DIAGNOSIS — Z01.419 WELL WOMAN EXAM: Primary | ICD-10-CM

## 2020-12-30 LAB
T4 FREE SERPL-MCNC: 1.3 NG/DL (ref 0.8–1.7)
TSI SER-ACNC: 3.5 MIU/ML (ref 0.36–3.74)

## 2020-12-30 PROCEDURE — 99395 PREV VISIT EST AGE 18-39: CPT | Performed by: OBSTETRICS & GYNECOLOGY

## 2020-12-30 PROCEDURE — 3078F DIAST BP <80 MM HG: CPT | Performed by: OBSTETRICS & GYNECOLOGY

## 2020-12-30 PROCEDURE — 36415 COLL VENOUS BLD VENIPUNCTURE: CPT

## 2020-12-30 PROCEDURE — 84443 ASSAY THYROID STIM HORMONE: CPT

## 2020-12-30 PROCEDURE — 3074F SYST BP LT 130 MM HG: CPT | Performed by: OBSTETRICS & GYNECOLOGY

## 2020-12-30 PROCEDURE — 84439 ASSAY OF FREE THYROXINE: CPT

## 2020-12-30 RX ORDER — MISOPROSTOL 200 UG/1
TABLET ORAL
COMMUNITY
Start: 2020-09-02 | End: 2020-12-30

## 2020-12-30 NOTE — H&P
HPI:  The patient is a 34 yo F here for WWE. NO GYN c/o. Monthly lighter cycles with 7 days flow now with mirena. Happy with IUD. Monogamous.       LPS: 12/19/18 pap neg    Reviewed medical and surgical history below       OBSTETRICS HISTORY:  OB Hist on file      Intimate partner violence        Fear of current or ex partner: Not on file        Emotionally abused: Not on file        Physically abused: Not on file        Forced sexual activity: Not on file    Other Topics      Concerns:         thyromegaly, no nodules, no adenopathy  Heart: Regular rate and rhythm   Lungs: clear to ascultation bilaterally   Lymphatic:no abnormal supraclavicular or axillary adenopathy is noted  Breast: normal without palpable masses, tenderness, asymmetry, nipple

## 2021-03-02 ENCOUNTER — PATIENT MESSAGE (OUTPATIENT)
Dept: OBGYN CLINIC | Facility: CLINIC | Age: 31
End: 2021-03-02

## 2021-03-02 NOTE — TELEPHONE ENCOUNTER
From: Petey Brice  To: [facility], DO  Sent: 3/2/2021 12:44 AM CST  Subject: Non-Urgent Estevan Garnica Res! Quick question, I had my period Feb.7th- 14th and then I started having spotting from the 21st till today.  It is light spotti

## 2021-03-03 NOTE — TELEPHONE ENCOUNTER
Message to [facility] [employer]  for further recs and sign off. See [employer].

## 2021-03-05 DIAGNOSIS — E03.8 HYPOTHYROIDISM DUE TO HASHIMOTO'S THYROIDITIS: ICD-10-CM

## 2021-03-05 DIAGNOSIS — E06.3 HYPOTHYROIDISM DUE TO HASHIMOTO'S THYROIDITIS: ICD-10-CM

## 2021-03-06 RX ORDER — LEVOTHYROXINE SODIUM 137 UG/1
TABLET ORAL
Qty: 30 TABLET | Refills: 3 | Status: SHIPPED | OUTPATIENT
Start: 2021-03-06 | End: 2021-07-19

## 2021-03-30 ENCOUNTER — IMMUNIZATION (OUTPATIENT)
Dept: LAB | Facility: HOSPITAL | Age: 31
End: 2021-03-30
Attending: HOSPITALIST
Payer: MEDICAID

## 2021-03-30 DIAGNOSIS — Z23 NEED FOR VACCINATION: Primary | ICD-10-CM

## 2021-03-30 PROCEDURE — 0011A SARSCOV2 VAC 100MCG/0.5ML IM: CPT

## 2021-04-27 ENCOUNTER — IMMUNIZATION (OUTPATIENT)
Dept: LAB | Facility: HOSPITAL | Age: 31
End: 2021-04-27
Attending: EMERGENCY MEDICINE
Payer: MEDICAID

## 2021-04-27 DIAGNOSIS — Z23 NEED FOR VACCINATION: Primary | ICD-10-CM

## 2021-04-27 PROCEDURE — 0012A SARSCOV2 VAC 100MCG/0.5ML IM: CPT | Performed by: FAMILY MEDICINE

## 2021-06-14 NOTE — PAYOR COMM NOTE
--------------  PLEASE FAX INPT DAYS AUTHORIZED ALONG WITH NRD [phone]    [facility] YOU    ADMISSION REVIEW     Payor: [facility]  Subscriber #:  [id number]  Authorization Number: [id number]    Admit date: 7/16/20  Admit time: 2241           History & All scripts entered and sent to Dr of Ok Didi Hickey CMA ([facility])     --Nuchal x 2; tight  --2cm Subserorsal fibroid on posterior mid body of uterus  --left 3cm paratubal cyst  --Normal appearing uterus, bilateral tubes and ovaries otherwise  --CHANEL at delivery

## 2021-07-19 DIAGNOSIS — E03.8 HYPOTHYROIDISM DUE TO HASHIMOTO'S THYROIDITIS: ICD-10-CM

## 2021-07-19 DIAGNOSIS — E06.3 HYPOTHYROIDISM DUE TO HASHIMOTO'S THYROIDITIS: ICD-10-CM

## 2021-07-19 RX ORDER — LEVOTHYROXINE SODIUM 137 UG/1
TABLET ORAL
Qty: 30 TABLET | Refills: 3 | Status: SHIPPED | OUTPATIENT
Start: 2021-07-19 | End: 2021-09-10

## 2021-09-10 ENCOUNTER — PATIENT MESSAGE (OUTPATIENT)
Dept: ENDOCRINOLOGY CLINIC | Facility: CLINIC | Age: 31
End: 2021-09-10

## 2021-09-10 DIAGNOSIS — E06.3 HYPOTHYROIDISM DUE TO HASHIMOTO'S THYROIDITIS: ICD-10-CM

## 2021-09-10 DIAGNOSIS — E03.8 HYPOTHYROIDISM DUE TO HASHIMOTO'S THYROIDITIS: ICD-10-CM

## 2021-09-10 RX ORDER — LEVOTHYROXINE SODIUM 137 UG/1
137 TABLET ORAL
Qty: 30 TABLET | Refills: 0 | Status: SHIPPED | OUTPATIENT
Start: 2021-09-10 | End: 2021-09-10

## 2021-09-10 RX ORDER — LEVOTHYROXINE SODIUM 137 UG/1
137 TABLET ORAL
Qty: 30 TABLET | Refills: 2 | Status: SHIPPED | OUTPATIENT
Start: 2021-09-10 | End: 2021-10-20

## 2021-09-10 NOTE — TELEPHONE ENCOUNTER
Future Appointments   Date Time Provider Silviano Imani   12/20/2021  4:30 PM Doron Ramos MD [address] [place] [address]     Pt made an appointment, changed pended refill to last till above appointment

## 2021-09-10 NOTE — TELEPHONE ENCOUNTER
LOV: 10/16/20 RTC 1 year    No future appointments. [facility] message sent to make an appointment.   Pended 30 days

## 2021-09-10 NOTE — TELEPHONE ENCOUNTER
From: Tiara Pedroza  To: Karen Mason MD  Sent: 9/10/2021 7:57 AM CDT  Subject: Prescription Question    Hello! I lost my prescription bottle, am I able to have another sent over to my pharmacy? Thank You!   Tiara Pedroza

## 2021-10-20 DIAGNOSIS — E06.3 HYPOTHYROIDISM DUE TO HASHIMOTO'S THYROIDITIS: ICD-10-CM

## 2021-10-20 DIAGNOSIS — E03.8 HYPOTHYROIDISM DUE TO HASHIMOTO'S THYROIDITIS: ICD-10-CM

## 2021-10-20 RX ORDER — LEVOTHYROXINE SODIUM 137 UG/1
TABLET ORAL
Qty: 30 TABLET | Refills: 2 | Status: SHIPPED | OUTPATIENT
Start: 2021-10-20

## 2021-12-20 ENCOUNTER — LAB ENCOUNTER (OUTPATIENT)
Dept: LAB | Facility: HOSPITAL | Age: 31
End: 2021-12-20
Attending: INTERNAL MEDICINE
Payer: COMMERCIAL

## 2021-12-20 ENCOUNTER — OFFICE VISIT (OUTPATIENT)
Dept: ENDOCRINOLOGY CLINIC | Facility: CLINIC | Age: 31
End: 2021-12-20
Payer: COMMERCIAL

## 2021-12-20 VITALS
HEART RATE: 74 BPM | BODY MASS INDEX: 42 KG/M2 | DIASTOLIC BLOOD PRESSURE: 81 MMHG | SYSTOLIC BLOOD PRESSURE: 117 MMHG | WEIGHT: 249.81 LBS

## 2021-12-20 DIAGNOSIS — E89.0 POSTABLATIVE HYPOTHYROIDISM: ICD-10-CM

## 2021-12-20 DIAGNOSIS — E89.0 POSTABLATIVE HYPOTHYROIDISM: Primary | ICD-10-CM

## 2021-12-20 PROCEDURE — 84439 ASSAY OF FREE THYROXINE: CPT

## 2021-12-20 PROCEDURE — 3079F DIAST BP 80-89 MM HG: CPT | Performed by: INTERNAL MEDICINE

## 2021-12-20 PROCEDURE — 99213 OFFICE O/P EST LOW 20 MIN: CPT | Performed by: INTERNAL MEDICINE

## 2021-12-20 PROCEDURE — 36415 COLL VENOUS BLD VENIPUNCTURE: CPT

## 2021-12-20 PROCEDURE — 3074F SYST BP LT 130 MM HG: CPT | Performed by: INTERNAL MEDICINE

## 2021-12-20 PROCEDURE — 84443 ASSAY THYROID STIM HORMONE: CPT

## 2021-12-20 NOTE — PROGRESS NOTES
Name: Leigh Ann Mayes  Date: 12/20/2021    Referring Physician: No ref.  provider found    Patient presents with:  Hypothyroidism: follow up;       HISTORY OF PRESENT ILLNESS   Leigh Ann Mayes is a 28year old female who presents for Patient presents with:  Hyp Never Used    Vaping Use      Vaping Use: Never used    Substance and Sexual Activity      Alcohol use: No        Alcohol/week: 0.0 standard drinks      Drug use: No      Sexual activity: Yes    Other Topics      Concerns:        Caffeine Concern:  No

## 2022-01-14 ENCOUNTER — IMMUNIZATION (OUTPATIENT)
Dept: LAB | Facility: HOSPITAL | Age: 32
End: 2022-01-14
Attending: EMERGENCY MEDICINE
Payer: COMMERCIAL

## 2022-01-14 DIAGNOSIS — Z23 NEED FOR VACCINATION: Primary | ICD-10-CM

## 2022-01-14 PROCEDURE — 0064A SARSCOV2 VAC 50MCG/0.25ML IM: CPT | Performed by: PHYSICIAN ASSISTANT

## 2022-02-21 ENCOUNTER — APPOINTMENT (OUTPATIENT)
Dept: GENERAL RADIOLOGY | Facility: HOSPITAL | Age: 32
End: 2022-02-21
Payer: OTHER MISCELLANEOUS

## 2022-02-21 ENCOUNTER — APPOINTMENT (OUTPATIENT)
Dept: GENERAL RADIOLOGY | Facility: HOSPITAL | Age: 32
End: 2022-02-21
Attending: STUDENT IN AN ORGANIZED HEALTH CARE EDUCATION/TRAINING PROGRAM
Payer: OTHER MISCELLANEOUS

## 2022-02-21 ENCOUNTER — PATIENT OUTREACH (OUTPATIENT)
Dept: CASE MANAGEMENT | Age: 32
End: 2022-02-21

## 2022-02-21 ENCOUNTER — TELEPHONE (OUTPATIENT)
Dept: ORTHOPEDICS CLINIC | Facility: CLINIC | Age: 32
End: 2022-02-21

## 2022-02-21 ENCOUNTER — HOSPITAL ENCOUNTER (EMERGENCY)
Facility: HOSPITAL | Age: 32
Discharge: HOME | End: 2022-02-21
Attending: STUDENT IN AN ORGANIZED HEALTH CARE EDUCATION/TRAINING PROGRAM
Payer: OTHER MISCELLANEOUS

## 2022-02-21 VITALS
SYSTOLIC BLOOD PRESSURE: 126 MMHG | BODY MASS INDEX: 41.48 KG/M2 | HEART RATE: 79 BPM | OXYGEN SATURATION: 99 % | HEIGHT: 65 IN | RESPIRATION RATE: 18 BRPM | TEMPERATURE: 97 F | WEIGHT: 249 LBS | DIASTOLIC BLOOD PRESSURE: 80 MMHG

## 2022-02-21 DIAGNOSIS — S82.831A CLOSED FRACTURE OF DISTAL END OF RIGHT FIBULA, UNSPECIFIED FRACTURE MORPHOLOGY, INITIAL ENCOUNTER: Primary | ICD-10-CM

## 2022-02-21 PROCEDURE — 99284 EMERGENCY DEPT VISIT MOD MDM: CPT

## 2022-02-21 PROCEDURE — 27788 TREATMENT OF ANKLE FRACTURE: CPT

## 2022-02-21 PROCEDURE — 73610 X-RAY EXAM OF ANKLE: CPT | Performed by: STUDENT IN AN ORGANIZED HEALTH CARE EDUCATION/TRAINING PROGRAM

## 2022-02-21 RX ORDER — LIDOCAINE HYDROCHLORIDE 10 MG/ML
20 INJECTION, SOLUTION EPIDURAL; INFILTRATION; INTRACAUDAL; PERINEURAL ONCE
Status: DISCONTINUED | OUTPATIENT
Start: 2022-02-21 | End: 2022-02-21

## 2022-02-21 RX ORDER — IBUPROFEN 600 MG/1
600 TABLET ORAL ONCE
Status: COMPLETED | OUTPATIENT
Start: 2022-02-21 | End: 2022-02-21

## 2022-02-21 RX ORDER — HYDROCODONE BITARTRATE AND ACETAMINOPHEN 5; 325 MG/1; MG/1
2 TABLET ORAL ONCE
Status: COMPLETED | OUTPATIENT
Start: 2022-02-21 | End: 2022-02-21

## 2022-02-21 NOTE — ED INITIAL ASSESSMENT (HPI)
Pt presents to the ED for a slip on ice this morning at work, pt reports hearing a \"crack\" in her right ankle.

## 2022-02-21 NOTE — TELEPHONE ENCOUNTER
Called pt and she states she slipped on ice today at work and injured right ankle. Pt was seen in ER and put in temp splint and crutches and told fx. appt made on 2/22 @ 1pm with Dr. Johnna Morales. Address given.

## 2022-02-21 NOTE — TELEPHONE ENCOUNTER
Per Chantel with abdi inpt scheduling pt needs a follow up for a fractured fibula on 2/23, no appointments available. Per Chantel please call pt.  Please advise

## 2022-02-21 NOTE — PROGRESS NOTES
VM received; pt requesting assistance w/scheduling (dc 02/21)    Dr Duke Pelayo  [address]  [address]  [phone]  Follow up 2 days  Dr Itzel Arredondo office will call pt due to no availability in needed time frame  Confirmed w/pt  Closing encounter

## 2022-02-22 ENCOUNTER — OFFICE VISIT (OUTPATIENT)
Dept: ORTHOPEDICS CLINIC | Facility: CLINIC | Age: 32
End: 2022-02-22
Payer: COMMERCIAL

## 2022-02-22 ENCOUNTER — TELEPHONE (OUTPATIENT)
Dept: ORTHOPEDICS CLINIC | Facility: CLINIC | Age: 32
End: 2022-02-22

## 2022-02-22 ENCOUNTER — LAB ENCOUNTER (OUTPATIENT)
Dept: LAB | Facility: HOSPITAL | Age: 32
End: 2022-02-22
Attending: ORTHOPAEDIC SURGERY
Payer: OTHER MISCELLANEOUS

## 2022-02-22 VITALS — BODY MASS INDEX: 41.48 KG/M2 | HEIGHT: 65 IN | WEIGHT: 249 LBS

## 2022-02-22 DIAGNOSIS — E66.01 CLASS 3 SEVERE OBESITY DUE TO EXCESS CALORIES WITH SERIOUS COMORBIDITY AND BODY MASS INDEX (BMI) OF 40.0 TO 44.9 IN ADULT (HCC): ICD-10-CM

## 2022-02-22 DIAGNOSIS — S82.851G CLOSED DISPLACED TRIMALLEOLAR FRACTURE OF RIGHT ANKLE WITH DELAYED HEALING: Primary | ICD-10-CM

## 2022-02-22 DIAGNOSIS — S82.851G CLOSED DISPLACED TRIMALLEOLAR FRACTURE OF RIGHT ANKLE WITH DELAYED HEALING: ICD-10-CM

## 2022-02-22 PROCEDURE — 99244 OFF/OP CNSLTJ NEW/EST MOD 40: CPT | Performed by: ORTHOPAEDIC SURGERY

## 2022-02-22 PROCEDURE — 3008F BODY MASS INDEX DOCD: CPT | Performed by: ORTHOPAEDIC SURGERY

## 2022-02-22 RX ORDER — MULTIVIT-MIN/FA/LYCOPEN/LUTEIN .4-300-25
1 TABLET ORAL DAILY
Qty: 60 TABLET | Refills: 0 | Status: SHIPPED | OUTPATIENT
Start: 2022-02-22

## 2022-02-22 RX ORDER — CYANOCOBALAMIN (VITAMIN B-12) 1000 MCG
1 TABLET, EXTENDED RELEASE ORAL 2 TIMES DAILY WITH MEALS
Qty: 60 TABLET | Refills: 0 | Status: SHIPPED | OUTPATIENT
Start: 2022-02-22

## 2022-02-22 NOTE — TELEPHONE ENCOUNTER
Type of surgery:  Right ankle ORIF, possible syndesmotic fixation, fluoroscopy  Date: 2/25/22  Location: [facility]  Medical Clearance:  No Dr Angelo Cabezas     *Medical:      *Dental:      *Other:  Prior Authorization Status:   Workers Comp: Waiting to hear back from patient for Case #  Medacta/Gwendolyn:  Checklist:  Other:

## 2022-02-23 NOTE — TELEPHONE ENCOUNTER
Spoke with Pam Chavarria. She will be emailing all the information over with in the hour.   Phone #[phone]

## 2022-02-24 ENCOUNTER — TELEPHONE (OUTPATIENT)
Dept: ORTHOPEDICS CLINIC | Facility: CLINIC | Age: 32
End: 2022-02-24

## 2022-02-24 LAB — SARS-COV-2 RNA RESP QL NAA+PROBE: NOT DETECTED

## 2022-02-24 NOTE — TELEPHONE ENCOUNTER
Pt called stating pt was referred to [facility] for a rolling walker. They do not have. Can pt be referred somewhere else.   Please call

## 2022-02-25 ENCOUNTER — ANESTHESIA (OUTPATIENT)
Dept: SURGERY | Facility: HOSPITAL | Age: 32
End: 2022-02-25
Payer: OTHER MISCELLANEOUS

## 2022-02-25 ENCOUNTER — ANESTHESIA EVENT (OUTPATIENT)
Dept: SURGERY | Facility: HOSPITAL | Age: 32
End: 2022-02-25
Payer: OTHER MISCELLANEOUS

## 2022-02-25 ENCOUNTER — HOSPITAL ENCOUNTER (OUTPATIENT)
Facility: HOSPITAL | Age: 32
Setting detail: HOSPITAL OUTPATIENT SURGERY
Discharge: HOME | End: 2022-02-25
Attending: ORTHOPAEDIC SURGERY | Admitting: ORTHOPAEDIC SURGERY
Payer: OTHER MISCELLANEOUS

## 2022-02-25 ENCOUNTER — APPOINTMENT (OUTPATIENT)
Dept: GENERAL RADIOLOGY | Facility: HOSPITAL | Age: 32
End: 2022-02-25
Attending: ORTHOPAEDIC SURGERY
Payer: OTHER MISCELLANEOUS

## 2022-02-25 VITALS
SYSTOLIC BLOOD PRESSURE: 123 MMHG | HEART RATE: 80 BPM | HEIGHT: 65 IN | OXYGEN SATURATION: 97 % | RESPIRATION RATE: 18 BRPM | TEMPERATURE: 98 F | DIASTOLIC BLOOD PRESSURE: 76 MMHG | WEIGHT: 249 LBS | BODY MASS INDEX: 41.48 KG/M2

## 2022-02-25 DIAGNOSIS — S82.851G CLOSED DISPLACED TRIMALLEOLAR FRACTURE OF RIGHT ANKLE WITH DELAYED HEALING: ICD-10-CM

## 2022-02-25 PROBLEM — S93.431D ANKLE SYNDESMOSIS DISRUPTION, RIGHT, SUBSEQUENT ENCOUNTER: Status: ACTIVE | Noted: 2022-02-25

## 2022-02-25 LAB — B-HCG UR QL: NEGATIVE

## 2022-02-25 PROCEDURE — 0SSF04Z REPOSITION RIGHT ANKLE JOINT WITH INTERNAL FIXATION DEVICE, OPEN APPROACH: ICD-10-PCS | Performed by: ORTHOPAEDIC SURGERY

## 2022-02-25 PROCEDURE — 76000 FLUOROSCOPY <1 HR PHYS/QHP: CPT | Performed by: ORTHOPAEDIC SURGERY

## 2022-02-25 PROCEDURE — 64447 NJX AA&/STRD FEMORAL NRV IMG: CPT | Performed by: ORTHOPAEDIC SURGERY

## 2022-02-25 PROCEDURE — 0QSG04Z REPOSITION RIGHT TIBIA WITH INTERNAL FIXATION DEVICE, OPEN APPROACH: ICD-10-PCS | Performed by: ORTHOPAEDIC SURGERY

## 2022-02-25 PROCEDURE — 64445 NJX AA&/STRD SCIATIC NRV IMG: CPT | Performed by: ORTHOPAEDIC SURGERY

## 2022-02-25 PROCEDURE — 76942 ECHO GUIDE FOR BIOPSY: CPT | Performed by: ORTHOPAEDIC SURGERY

## 2022-02-25 PROCEDURE — 81025 URINE PREGNANCY TEST: CPT

## 2022-02-25 DEVICE — SCREW CANC FT 4.0X14 206.014: Type: IMPLANTABLE DEVICE | Site: ANKLE | Status: FUNCTIONAL

## 2022-02-25 DEVICE — IMPLANTABLE DEVICE: Type: IMPLANTABLE DEVICE | Site: ANKLE | Status: FUNCTIONAL

## 2022-02-25 DEVICE — SCREW LOCKING 3.5X14 212.103: Type: IMPLANTABLE DEVICE | Site: ANKLE | Status: FUNCTIONAL

## 2022-02-25 DEVICE — PLATE LCP TUB 1/3 8H 241.381: Type: IMPLANTABLE DEVICE | Site: ANKLE | Status: FUNCTIONAL

## 2022-02-25 RX ORDER — IBUPROFEN 200 MG
600 TABLET ORAL EVERY 6 HOURS PRN
Qty: 40 TABLET | Refills: 0 | Status: SHIPPED | OUTPATIENT
Start: 2022-02-25 | End: 2022-03-03

## 2022-02-25 RX ORDER — METOCLOPRAMIDE 10 MG/1
10 TABLET ORAL ONCE
Status: COMPLETED | OUTPATIENT
Start: 2022-02-25 | End: 2022-02-25

## 2022-02-25 RX ORDER — SODIUM CHLORIDE, SODIUM LACTATE, POTASSIUM CHLORIDE, CALCIUM CHLORIDE 600; 310; 30; 20 MG/100ML; MG/100ML; MG/100ML; MG/100ML
INJECTION, SOLUTION INTRAVENOUS CONTINUOUS
Status: DISCONTINUED | OUTPATIENT
Start: 2022-02-25 | End: 2022-02-25

## 2022-02-25 RX ORDER — ASPIRIN 81 MG/1
81 TABLET ORAL 2 TIMES DAILY WITH MEALS
Qty: 24 TABLET | Refills: 0 | Status: SHIPPED | OUTPATIENT
Start: 2022-02-25

## 2022-02-25 RX ORDER — FAMOTIDINE 20 MG/1
20 TABLET, FILM COATED ORAL ONCE
Status: COMPLETED | OUTPATIENT
Start: 2022-02-25 | End: 2022-02-25

## 2022-02-25 RX ORDER — CEFAZOLIN SODIUM/WATER 2 G/20 ML
SYRINGE (ML) INTRAVENOUS AS NEEDED
Status: DISCONTINUED | OUTPATIENT
Start: 2022-02-25 | End: 2022-02-25 | Stop reason: SURG

## 2022-02-25 RX ORDER — ACETAMINOPHEN 500 MG
1000 TABLET ORAL ONCE
Status: COMPLETED | OUTPATIENT
Start: 2022-02-25 | End: 2022-02-25

## 2022-02-25 RX ORDER — HYDROMORPHONE HYDROCHLORIDE 1 MG/ML
0.2 INJECTION, SOLUTION INTRAMUSCULAR; INTRAVENOUS; SUBCUTANEOUS EVERY 5 MIN PRN
Status: DISCONTINUED | OUTPATIENT
Start: 2022-02-25 | End: 2022-02-25

## 2022-02-25 RX ORDER — CEFAZOLIN SODIUM/WATER 2 G/20 ML
2 SYRINGE (ML) INTRAVENOUS ONCE
Status: DISCONTINUED | OUTPATIENT
Start: 2022-02-25 | End: 2022-02-25 | Stop reason: HOSPADM

## 2022-02-25 RX ORDER — HYDROMORPHONE HYDROCHLORIDE 1 MG/ML
0.6 INJECTION, SOLUTION INTRAMUSCULAR; INTRAVENOUS; SUBCUTANEOUS EVERY 5 MIN PRN
Status: DISCONTINUED | OUTPATIENT
Start: 2022-02-25 | End: 2022-02-25

## 2022-02-25 RX ORDER — DEXAMETHASONE SODIUM PHOSPHATE 10 MG/ML
INJECTION, SOLUTION INTRAMUSCULAR; INTRAVENOUS AS NEEDED
Status: DISCONTINUED | OUTPATIENT
Start: 2022-02-25 | End: 2022-02-25 | Stop reason: SURG

## 2022-02-25 RX ORDER — ROPIVACAINE HYDROCHLORIDE 5 MG/ML
INJECTION, SOLUTION EPIDURAL; INFILTRATION; PERINEURAL AS NEEDED
Status: DISCONTINUED | OUTPATIENT
Start: 2022-02-25 | End: 2022-02-25 | Stop reason: SURG

## 2022-02-25 RX ORDER — LIDOCAINE HYDROCHLORIDE 10 MG/ML
INJECTION, SOLUTION EPIDURAL; INFILTRATION; INTRACAUDAL; PERINEURAL AS NEEDED
Status: DISCONTINUED | OUTPATIENT
Start: 2022-02-25 | End: 2022-02-25 | Stop reason: SURG

## 2022-02-25 RX ORDER — ONDANSETRON 2 MG/ML
4 INJECTION INTRAMUSCULAR; INTRAVENOUS EVERY 6 HOURS PRN
Status: CANCELLED | OUTPATIENT
Start: 2022-02-25

## 2022-02-25 RX ORDER — HYDROCODONE BITARTRATE AND ACETAMINOPHEN 5; 325 MG/1; MG/1
1 TABLET ORAL EVERY 6 HOURS PRN
Qty: 20 TABLET | Refills: 0 | Status: SHIPPED | OUTPATIENT
Start: 2022-02-25

## 2022-02-25 RX ORDER — HYDROCODONE BITARTRATE AND ACETAMINOPHEN 5; 325 MG/1; MG/1
2 TABLET ORAL AS NEEDED
Status: DISCONTINUED | OUTPATIENT
Start: 2022-02-25 | End: 2022-02-25

## 2022-02-25 RX ORDER — MORPHINE SULFATE 4 MG/ML
4 INJECTION, SOLUTION INTRAMUSCULAR; INTRAVENOUS EVERY 10 MIN PRN
Status: DISCONTINUED | OUTPATIENT
Start: 2022-02-25 | End: 2022-02-25

## 2022-02-25 RX ORDER — DEXAMETHASONE SODIUM PHOSPHATE 4 MG/ML
VIAL (ML) INJECTION AS NEEDED
Status: DISCONTINUED | OUTPATIENT
Start: 2022-02-25 | End: 2022-02-25 | Stop reason: SURG

## 2022-02-25 RX ORDER — NALOXONE HYDROCHLORIDE 0.4 MG/ML
80 INJECTION, SOLUTION INTRAMUSCULAR; INTRAVENOUS; SUBCUTANEOUS AS NEEDED
Status: DISCONTINUED | OUTPATIENT
Start: 2022-02-25 | End: 2022-02-25

## 2022-02-25 RX ORDER — PROCHLORPERAZINE EDISYLATE 5 MG/ML
5 INJECTION INTRAMUSCULAR; INTRAVENOUS ONCE AS NEEDED
Status: DISCONTINUED | OUTPATIENT
Start: 2022-02-25 | End: 2022-02-25

## 2022-02-25 RX ORDER — HYDROMORPHONE HYDROCHLORIDE 1 MG/ML
0.4 INJECTION, SOLUTION INTRAMUSCULAR; INTRAVENOUS; SUBCUTANEOUS EVERY 5 MIN PRN
Status: DISCONTINUED | OUTPATIENT
Start: 2022-02-25 | End: 2022-02-25

## 2022-02-25 RX ORDER — ONDANSETRON 2 MG/ML
4 INJECTION INTRAMUSCULAR; INTRAVENOUS ONCE AS NEEDED
Status: DISCONTINUED | OUTPATIENT
Start: 2022-02-25 | End: 2022-02-25

## 2022-02-25 RX ORDER — MORPHINE SULFATE 4 MG/ML
2 INJECTION, SOLUTION INTRAMUSCULAR; INTRAVENOUS EVERY 10 MIN PRN
Status: DISCONTINUED | OUTPATIENT
Start: 2022-02-25 | End: 2022-02-25

## 2022-02-25 RX ORDER — ONDANSETRON 2 MG/ML
INJECTION INTRAMUSCULAR; INTRAVENOUS AS NEEDED
Status: DISCONTINUED | OUTPATIENT
Start: 2022-02-25 | End: 2022-02-25 | Stop reason: SURG

## 2022-02-25 RX ORDER — MIDAZOLAM HYDROCHLORIDE 1 MG/ML
INJECTION INTRAMUSCULAR; INTRAVENOUS AS NEEDED
Status: DISCONTINUED | OUTPATIENT
Start: 2022-02-25 | End: 2022-02-25 | Stop reason: SURG

## 2022-02-25 RX ORDER — MORPHINE SULFATE 10 MG/ML
6 INJECTION, SOLUTION INTRAMUSCULAR; INTRAVENOUS EVERY 10 MIN PRN
Status: DISCONTINUED | OUTPATIENT
Start: 2022-02-25 | End: 2022-02-25

## 2022-02-25 RX ORDER — HYDROCODONE BITARTRATE AND ACETAMINOPHEN 5; 325 MG/1; MG/1
1 TABLET ORAL AS NEEDED
Status: DISCONTINUED | OUTPATIENT
Start: 2022-02-25 | End: 2022-02-25

## 2022-02-25 RX ADMIN — SODIUM CHLORIDE, SODIUM LACTATE, POTASSIUM CHLORIDE, CALCIUM CHLORIDE: 600; 310; 30; 20 INJECTION, SOLUTION INTRAVENOUS at 09:58:00

## 2022-02-25 RX ADMIN — DEXAMETHASONE SODIUM PHOSPHATE 2 MG: 10 INJECTION, SOLUTION INTRAMUSCULAR; INTRAVENOUS at 09:22:00

## 2022-02-25 RX ADMIN — LIDOCAINE HYDROCHLORIDE 25 MG: 10 INJECTION, SOLUTION EPIDURAL; INFILTRATION; INTRACAUDAL; PERINEURAL at 09:58:00

## 2022-02-25 RX ADMIN — ONDANSETRON 4 MG: 2 INJECTION INTRAMUSCULAR; INTRAVENOUS at 10:00:00

## 2022-02-25 RX ADMIN — MIDAZOLAM HYDROCHLORIDE 2 MG: 1 INJECTION INTRAMUSCULAR; INTRAVENOUS at 09:18:00

## 2022-02-25 RX ADMIN — DEXAMETHASONE SODIUM PHOSPHATE 4 MG: 4 MG/ML VIAL (ML) INJECTION at 10:00:00

## 2022-02-25 RX ADMIN — CEFAZOLIN SODIUM/WATER 2 G: 2 G/20 ML SYRINGE (ML) INTRAVENOUS at 10:06:00

## 2022-02-25 RX ADMIN — ROPIVACAINE HYDROCHLORIDE 20 ML: 5 INJECTION, SOLUTION EPIDURAL; INFILTRATION; PERINEURAL at 09:25:00

## 2022-02-25 RX ADMIN — ROPIVACAINE HYDROCHLORIDE 20 ML: 5 INJECTION, SOLUTION EPIDURAL; INFILTRATION; PERINEURAL at 09:22:00

## 2022-02-25 RX ADMIN — DEXAMETHASONE SODIUM PHOSPHATE 2 MG: 10 INJECTION, SOLUTION INTRAMUSCULAR; INTRAVENOUS at 09:25:00

## 2022-02-25 NOTE — BRIEF OP NOTE
Pre-Operative Diagnosis: 1) closed displaced trimalleolar fracture of right ankle with delayed healing [S82.851G], 2) right ankle syndesmosis disruption, 3) obesity (BMI 41.44)     Post-Operative Diagnosis: Same     Procedure Performed: Right ankle 1) ORIF right trimalleolar ankle fracture without posterior lip, 2) ORIF right ankle syndesmosis, 3) fluoroscopy    Surgeon(s) and Role: Kate Still MD - Primary    Assistant(s):  PA: Cris Mane PA-C     Anesthesia: Dr. Elaine with popliteal/saphenous blocks and LMA     Surgical Findings: Synthes stainless steel one third semitubular plate. Synthes syndesmotic fixation device. Tourniquet 33 minutes at 300 mmHg. Drains: None  Specimen: None  Complications: None  Estimated Blood Loss: 10 cc  Stable to PACU.     Kamlesh Paz MD  2/25/2022  11:11 AM

## 2022-02-25 NOTE — OR NURSING
Instructions given for mobility with crutches, walker, and kneeling walker. Spouse at bedside for instruction.

## 2022-02-25 NOTE — ANESTHESIA POSTPROCEDURE EVALUATION
Patient: Helio Hamilton    Procedure Summary     Date: 02/25/22 Room / Location: [address] [place] [address] MAIN OR 17 / [address] [place] [address] MAIN OR    Anesthesia Start: 0536 Anesthesia Stop: 9812    Procedure: Right ankle open reduction internal fixation, syndesmotic fixation.  fluoroscopy (Right ) Diagnosis:       Closed displaced trimalleolar fracture of right ankle with delayed healing      (Closed displaced trimalleolar fracture of right ankle with delayed healing [S82.851G])    Surgeons: Teresita Ureña MD Anesthesiologist: Michael Elaine MD    Anesthesia Type: general ASA Status: 2          Anesthesia Type: general    PACU Vitals    BP   130/89   Temp   98.5   Pulse  84   Resp   16   SpO2   97       [facility] AN Post Evaluation:   Patient Evaluated in PACU  Patient Participation: complete - patient participated  Level of Consciousness: awake  Pain Management: adequate  Airway Patency:patent  Dental exam unchanged from preop  Yes    Cardiovascular Status: acceptable  Respiratory Status: acceptable  Postoperative Hydration acceptable      Joslyn Short MD  2/25/2022 11:25 AM

## 2022-02-25 NOTE — ANESTHESIA PROCEDURE NOTES
Peripheral Block  Performed by: Indra Leblanc MD  Authorized by: Indra Leblanc MD       General Information and Staff    Start Time:  2/25/2022 9:18 AM  End Time:  2/25/2022 9:22 AM  Anesthesiologist:  Indra Leblanc MD  Performed by:   Anesthesiologist  Patient Location:  PACU    Block Placement: Pre Induction  Site Identification: real time ultrasound guided and image stored and retrievable    Block site/laterality marked before start: site marked  Reason for Block: at surgeon's request and post-op pain management    Preanesthetic Checklist: 2 patient identifers, IV checked, risks and benefits discussed, monitors and equipment checked, pre-op evaluation, timeout performed, anesthesia consent and sterile technique used      Procedure Details    Patient Position:  Left lateral decubitus  Prep: ChloraPrep and patient draped    Monitoring:  Cardiac monitor, continuous pulse ox, blood pressure cuff and heart rate  Block Type:  Popliteal  Laterality:  Right  Injection Technique:  Single-shot    Needle    Needle Type:  Short-bevel  Needle Gauge:  22 G  Needle Length:  90 mm  Needle Localization:  Ultrasound guidance  Reason for Ultrasound Use: appropriate spread of the medication was noted in real time and no ultrasound evidence of intravascular and/or intraneural injection            Assessment    Injection Assessment:  Good spread noted, incremental injection, local visualized surrounding nerve on ultrasound, low pressure, negative aspiration for heme and no pain on injection  Heart Rate Change: No        Medications      Additional Comments

## 2022-02-25 NOTE — INTERVAL H&P NOTE
Pre-op Diagnosis: Closed displaced trimalleolar fracture of right ankle with delayed healing [S82.851G]    The above referenced H&P was reviewed by Renan Cuello MD on 2/25/2022, the patient was examined and no significant changes have occurred in the patient's condition since the H&P was performed. I discussed with the patient and/or legal representative the potential benefits, risks and side effects of this procedure; the likelihood of the patient achieving goals; and potential problems that might occur during recuperation. I discussed reasonable alternatives to the procedure, including risks, benefits and side effects related to the alternatives and risks related to not receiving this procedure. We will proceed with procedure as planned.

## 2022-02-25 NOTE — ANESTHESIA PROCEDURE NOTES
Peripheral Block  Performed by: Terell Mills MD  Authorized by: Terell Mills MD       General Information and Staff    Start Time:  2/25/2022 9:23 AM  End Time:  2/25/2022 9:25 AM  Anesthesiologist:  Terell Mills MD  Performed by:   Anesthesiologist  Patient Location:  PACU    Block Placement: Pre Induction  Site Identification: real time ultrasound guided, nerve stimulator and image stored and retrievable    Block site/laterality marked before start: site marked  Reason for Block: at surgeon's request and post-op pain management    Preanesthetic Checklist: 2 patient identifers, IV checked, risks and benefits discussed, monitors and equipment checked, pre-op evaluation, timeout performed, anesthesia consent and sterile technique used      Procedure Details    Patient Position:  Supine  Prep: ChloraPrep    Monitoring:  Cardiac monitor, continuous pulse ox, blood pressure cuff and heart rate  Block Type:  Saphenous  Laterality:  Right    Needle    Needle Type:  Short-bevel  Needle Gauge:  22 G  Needle Length:  90 mm  Needle Localization:  Ultrasound guidance and nerve stimulator  Reason for Ultrasound Use: appropriate spread of the medication was noted in real time and no ultrasound evidence of intravascular and/or intraneural injection            Assessment    Injection Assessment:  Good spread noted, incremental injection, local visualized surrounding nerve on ultrasound, low pressure, negative aspiration for heme and no pain on injection  Heart Rate Change: No        Medications      Additional Comments

## 2022-02-25 NOTE — ANESTHESIA PROCEDURE NOTES
Airway  Date/Time: 2/25/2022 10:03 AM  Urgency: Elective      General Information and Staff    Patient location during procedure: OR  Anesthesiologist: Brooke Elaine MD  Performed: anesthesiologist     Indications and Patient Condition  Indications for airway management: anesthesia  Sedation level: deep  Preoxygenated: yes  Patient position: sniffing  Mask difficulty assessment: 2 - vent by mask + OA or adjuvant +/- NMBA    Final Airway Details  Final airway type: supraglottic airway      Successful airway: classic  Size 4      Number of attempts at approach: 1  Number of other approaches attempted: 0

## 2022-02-28 NOTE — OPERATIVE REPORT
[facility]    PATIENT'S NAME: Silvestre Chandler   ATTENDING PHYSICIAN: [address] DILAN Capellan MD   OPERATING PHYSICIAN: Codie Capellan MD   PATIENT ACCOUNT#:   [id number]    LOCATION:  [facility] PAC[facility] [address] [place] [address]  MEDICAL RECORD #:   [id number]       YOB: 1990  ADMISSION DATE:       02/25/2022      OPERATION DATE:  02/25/2022    OPERATIVE REPORT    PREOPERATIVE DIAGNOSIS:    1. Right trimalleolar ankle fracture. 2.   Right syndesmosis disruption. 3.   Obesity (BMI 41.44). POSTOPERATIVE DIAGNOSIS:    1. Right trimalleolar ankle fracture. 2.   Right syndesmosis disruption. 3.   Obesity (BMI 41.44). PROCEDURE:  Right ankle:  1. Open reduction and internal fixation, right trimalleolar ankle fracture without posterior lip. 2.   Open reduction and internal fixation, right ankle syndesmosis. 3.   Fluoroscopy. ASSISTANT:  Catherine Souza PA-C. ANESTHESIA:  Dr. Elaine with popliteal and saphenous blocks and LMA. INDICATIONS:  The patient is a 43-year-old woman who was injured in the work parking lot when she slipped on ice entering the building. She sustained the above fracture. She had a high fibular fracture and disruption of the syndesmosis with medial and posterior tenderness. Based on my examination, she had injured the medial deltoid ligament, the posterior malleolar ligaments as well as the fracture of the fibula and the syndesmosis constituting trimalleolar ankle fracture with a disruption of the syndesmosis. Because of the unstable nature of the fracture, I recommend open reduction, internal fixation. The risks and complications of surgery were discussed. No guarantees were given. The consent was signed. OPERATIVE TECHNIQUE:  The patient was brought to the operating room, placed in a supine position. Appropriate IV access and monitors were placed. Ancef 2 g IV was given for prophylaxis. SCD was on the left leg.   The blocks had been placed in the preoperative area and she was now placed under general laryngeal mask anesthesia. She was positioned in the lazy lateral position, and her left leg was well padded to protect the peroneal nerve. A tourniquet was placed on the upper right thigh and right lower extremity was prepped and draped in a sterile fashion with Betadine followed by ChloraPrep. The leg was exsanguinated and tourniquet inflated to 300 mmHg. Tourniquet time for the case was 33 minutes. Incision was made over the fibula and the soft tissue was stripped. The fracture was reduced. I attempted to place a lag screw from front to back. However, this kept displacing the fracture and I abandoned this. Fixation was then accomplished with a 1/3 semi-tubular plate. The first screw was a unicortical cancellous screw in the second to last hole. This had excellent purchase due to the good quality of her bone. I then placed a unicortical locking screw in the most distal hole. With the fracture clamped in anatomic alignment, the shaft screws were then filled with bicortical cortical screws. The proximal 3 had excellent purchase. The fourth had good purchase. The syndesmosis was noted to be unstable with widening medially on dorsiflexion and eversion. I placed a pelvis reduction clamp in the second to last screw head and then also on the medial malleolus. With the assistant holding maximal dorsiflexion, I gently tightened the clamp to prevent overt reduction. The third to last hole from the screw was in the appropriate position for the [employer] Fiber[employer] syndesmotic fixation device. The tibial screw had excellent purchase. A grommet was torque tightened to prevent overtightening. The guidewire was removed. The ankle was examined under fluoroscopy which showed anatomic alignment of the syndesmosis and mortise. The medial and posterior ligaments were now reduced due to the fixation of the syndesmosis and fibula. The tourniquet was let down.   The wounds were irrigated and closed in layers with staples for the skin. Xeroform, sterile gauze, sterile Webril, and padding on the heel was placed followed by a posterior mold and then ankle stirrup splints. The patient was awakened, extubated in the operating room and brought to the recovery room in stable condition. Blood loss was 10 mL. No specimens sent. No drains used. No complications were noted. Postop instructions were given in both written and oral form. She was placed on aspirin 81 mg twice a day starting with dinner tonight for DVT prophylaxis. Norco 5 mg and ibuprofen 600 mg tablets were given for pain relief. Centrum and Citracal plus D were given for nutritional benefit for healing. The patient will follow up with Dr. Alonzo Lizarraga in 2 weeks time in the office. Dictated By Pako Cox.  Alonzo Lizarraga MD  d: 02/25/2022 11:26:49  t: 02/25/2022 12:13:14  Job [id number]  [facility]/    cc: Dr. Nereyda Gaston

## 2022-03-01 ENCOUNTER — APPOINTMENT (OUTPATIENT)
Dept: CT IMAGING | Facility: HOSPITAL | Age: 32
DRG: 392 | End: 2022-03-01
Attending: EMERGENCY MEDICINE
Payer: COMMERCIAL

## 2022-03-01 ENCOUNTER — HOSPITAL ENCOUNTER (OUTPATIENT)
Age: 32
Discharge: ANOTHER HOSPITAL | End: 2022-03-01
Payer: COMMERCIAL

## 2022-03-01 ENCOUNTER — HOSPITAL ENCOUNTER (INPATIENT)
Facility: HOSPITAL | Age: 32
LOS: 2 days | Discharge: HOME | DRG: 392 | End: 2022-03-03
Attending: EMERGENCY MEDICINE | Admitting: EMERGENCY MEDICINE
Payer: COMMERCIAL

## 2022-03-01 VITALS
OXYGEN SATURATION: 99 % | SYSTOLIC BLOOD PRESSURE: 127 MMHG | DIASTOLIC BLOOD PRESSURE: 79 MMHG | RESPIRATION RATE: 16 BRPM | HEART RATE: 152 BPM | TEMPERATURE: 97 F

## 2022-03-01 DIAGNOSIS — R06.02 SHORTNESS OF BREATH: Primary | ICD-10-CM

## 2022-03-01 DIAGNOSIS — R00.0 TACHYCARDIA: ICD-10-CM

## 2022-03-01 LAB
ALBUMIN SERPL-MCNC: 3.6 G/DL (ref 3.4–5)
ALP LIVER SERPL-CCNC: 61 U/L
ALT SERPL-CCNC: 28 U/L
ANION GAP SERPL CALC-SCNC: 10 MMOL/L (ref 0–18)
APTT PPP: 27.8 SECONDS (ref 23.3–35.6)
AST SERPL-CCNC: 18 U/L (ref 15–37)
B-HCG UR QL: NEGATIVE
BILIRUB DIRECT SERPL-MCNC: 0.1 MG/DL (ref 0–0.2)
BILIRUB SERPL-MCNC: 0.7 MG/DL (ref 0.1–2)
BILIRUB UR QL STRIP: NEGATIVE
BUN BLD-MCNC: 12 MG/DL (ref 7–18)
BUN/CREAT SERPL: 14.8 (ref 10–20)
CALCIUM BLD-MCNC: 8.6 MG/DL (ref 8.5–10.1)
CHLORIDE SERPL-SCNC: 107 MMOL/L (ref 98–112)
CLARITY UR: CLEAR
CO2 SERPL-SCNC: 19 MMOL/L (ref 21–32)
COLOR UR: YELLOW
CREAT BLD-MCNC: 0.81 MG/DL
GLUCOSE BLD-MCNC: 107 MG/DL (ref 70–99)
GLUCOSE UR STRIP-MCNC: NEGATIVE MG/DL
HGB UR QL STRIP: NEGATIVE
INR BLD: 0.95 (ref 0.8–1.2)
KETONES UR STRIP-MCNC: NEGATIVE MG/DL
LEUKOCYTE ESTERASE UR QL STRIP: NEGATIVE
NITRITE UR QL STRIP: NEGATIVE
NT-PROBNP SERPL-MCNC: 16 PG/ML (ref ?–125)
OSMOLALITY SERPL CALC.SUM OF ELEC: 282 MOSM/KG (ref 275–295)
PH UR STRIP: 7 [PH]
POTASSIUM SERPL-SCNC: 3.7 MMOL/L (ref 3.5–5.1)
PROT SERPL-MCNC: 7.9 G/DL (ref 6.4–8.2)
PROT UR STRIP-MCNC: NEGATIVE MG/DL
PROTHROMBIN TIME: 12.8 SECONDS (ref 11.6–14.8)
SARS-COV-2 RNA RESP QL NAA+PROBE: NOT DETECTED
SODIUM SERPL-SCNC: 136 MMOL/L (ref 136–145)
SP GR UR STRIP: 1.01
TROPONIN I HIGH SENSITIVITY: 4 NG/L
UROBILINOGEN UR STRIP-ACNC: <2 MG/DL

## 2022-03-01 PROCEDURE — 71260 CT THORAX DX C+: CPT | Performed by: EMERGENCY MEDICINE

## 2022-03-01 PROCEDURE — 93000 ELECTROCARDIOGRAM COMPLETE: CPT | Performed by: NURSE PRACTITIONER

## 2022-03-01 PROCEDURE — 99213 OFFICE O/P EST LOW 20 MIN: CPT | Performed by: NURSE PRACTITIONER

## 2022-03-01 PROCEDURE — 99222 1ST HOSP IP/OBS MODERATE 55: CPT | Performed by: HOSPITALIST

## 2022-03-01 PROCEDURE — 81025 URINE PREGNANCY TEST: CPT | Performed by: NURSE PRACTITIONER

## 2022-03-01 PROCEDURE — 81002 URINALYSIS NONAUTO W/O SCOPE: CPT | Performed by: NURSE PRACTITIONER

## 2022-03-01 RX ORDER — HEPARIN SODIUM 5000 [USP'U]/ML
5000 INJECTION, SOLUTION INTRAVENOUS; SUBCUTANEOUS EVERY 12 HOURS
Status: DISCONTINUED | OUTPATIENT
Start: 2022-03-02 | End: 2022-03-02

## 2022-03-01 RX ORDER — METOCLOPRAMIDE HYDROCHLORIDE 5 MG/ML
10 INJECTION INTRAMUSCULAR; INTRAVENOUS EVERY 6 HOURS PRN
Status: DISCONTINUED | OUTPATIENT
Start: 2022-03-01 | End: 2022-03-03

## 2022-03-01 RX ORDER — ACETAMINOPHEN 325 MG/1
650 TABLET ORAL EVERY 6 HOURS PRN
Status: DISCONTINUED | OUTPATIENT
Start: 2022-03-01 | End: 2022-03-03

## 2022-03-01 RX ORDER — SODIUM CHLORIDE 9 MG/ML
1000 INJECTION, SOLUTION INTRAVENOUS ONCE
Status: COMPLETED | OUTPATIENT
Start: 2022-03-01 | End: 2022-03-02

## 2022-03-01 RX ORDER — ONDANSETRON 2 MG/ML
4 INJECTION INTRAMUSCULAR; INTRAVENOUS EVERY 6 HOURS PRN
Status: DISCONTINUED | OUTPATIENT
Start: 2022-03-01 | End: 2022-03-03

## 2022-03-01 RX ORDER — ZOLPIDEM TARTRATE 5 MG/1
5 TABLET ORAL NIGHTLY PRN
Status: DISCONTINUED | OUTPATIENT
Start: 2022-03-01 | End: 2022-03-03

## 2022-03-01 RX ORDER — PANTOPRAZOLE SODIUM 40 MG/1
40 TABLET, DELAYED RELEASE ORAL
Status: DISCONTINUED | OUTPATIENT
Start: 2022-03-02 | End: 2022-03-03

## 2022-03-01 RX ORDER — SODIUM CHLORIDE 9 MG/ML
INJECTION, SOLUTION INTRAVENOUS CONTINUOUS
Status: DISCONTINUED | OUTPATIENT
Start: 2022-03-02 | End: 2022-03-03

## 2022-03-01 RX ORDER — ASPIRIN 81 MG/1
81 TABLET ORAL 2 TIMES DAILY WITH MEALS
Status: DISCONTINUED | OUTPATIENT
Start: 2022-03-02 | End: 2022-03-03

## 2022-03-01 RX ORDER — ONDANSETRON 2 MG/ML
4 INJECTION INTRAMUSCULAR; INTRAVENOUS ONCE
Status: COMPLETED | OUTPATIENT
Start: 2022-03-01 | End: 2022-03-01

## 2022-03-01 RX ORDER — HYDRALAZINE HYDROCHLORIDE 20 MG/ML
10 INJECTION INTRAMUSCULAR; INTRAVENOUS EVERY 4 HOURS PRN
Status: DISCONTINUED | OUTPATIENT
Start: 2022-03-01 | End: 2022-03-03

## 2022-03-01 RX ORDER — HYDROCODONE BITARTRATE AND ACETAMINOPHEN 5; 325 MG/1; MG/1
1 TABLET ORAL EVERY 6 HOURS PRN
Status: DISCONTINUED | OUTPATIENT
Start: 2022-03-01 | End: 2022-03-03

## 2022-03-01 RX ORDER — SODIUM CHLORIDE 9 MG/ML
INJECTION, SOLUTION INTRAVENOUS CONTINUOUS
Status: DISCONTINUED | OUTPATIENT
Start: 2022-03-01 | End: 2022-03-01

## 2022-03-01 NOTE — ED INITIAL ASSESSMENT (HPI)
Pt presents to the IC with c/o n/v and dizziness. Pt recently fell and had surgery on her right ankle last Monday. Pt had surgery on Friday and has been taking ibuprofen, norco and aspirin as instructed by the surgeon. Pt called the doctor who thinks the nausea may be d/t her taking too many multivitamins (pt reports 2 multivitamins a day instead of the regular 1 a day). Pt denies fevers.

## 2022-03-02 ENCOUNTER — TELEPHONE (OUTPATIENT)
Dept: ORTHOPEDICS CLINIC | Facility: CLINIC | Age: 32
End: 2022-03-02

## 2022-03-02 ENCOUNTER — APPOINTMENT (OUTPATIENT)
Dept: CV DIAGNOSTICS | Facility: HOSPITAL | Age: 32
DRG: 392 | End: 2022-03-02
Attending: HOSPITALIST
Payer: COMMERCIAL

## 2022-03-02 LAB
ADENOVIRUS F 40/41 PCR: NEGATIVE
ANION GAP SERPL CALC-SCNC: 4 MMOL/L (ref 0–18)
ASTROVIRUS PCR: NEGATIVE
BASOPHILS # BLD AUTO: 0.02 X10(3) UL (ref 0–0.2)
BASOPHILS # BLD AUTO: 0.03 X10(3) UL (ref 0–0.2)
BASOPHILS NFR BLD AUTO: 0.3 %
BASOPHILS NFR BLD AUTO: 0.3 %
BUN BLD-MCNC: 11 MG/DL (ref 7–18)
BUN/CREAT SERPL: 16.2 (ref 10–20)
C CAYETANENSIS DNA SPEC QL NAA+PROBE: NEGATIVE
C DIFF TOX B STL QL: NEGATIVE
CALCIUM BLD-MCNC: 7.3 MG/DL (ref 8.5–10.1)
CAMPY SP DNA.DIARRHEA STL QL NAA+PROBE: NEGATIVE
CHLORIDE SERPL-SCNC: 114 MMOL/L (ref 98–112)
CO2 SERPL-SCNC: 25 MMOL/L (ref 21–32)
CREAT BLD-MCNC: 0.68 MG/DL
CRYPTOSP DNA SPEC QL NAA+PROBE: NEGATIVE
DEPRECATED HBV CORE AB SER IA-ACNC: 24.5 NG/ML
DEPRECATED RDW RBC AUTO: 42.1 FL (ref 35.1–46.3)
DEPRECATED RDW RBC AUTO: 43.7 FL (ref 35.1–46.3)
EAEC PAA PLAS AGGR+AATA ST NAA+NON-PRB: NEGATIVE
EC STX1+STX2 + H7 FLIC SPEC NAA+PROBE: NEGATIVE
ENTAMOEBA HISTOLYTICA PCR: NEGATIVE
EOSINOPHIL # BLD AUTO: 0.03 X10(3) UL (ref 0–0.7)
EOSINOPHIL # BLD AUTO: 0.04 X10(3) UL (ref 0–0.7)
EOSINOPHIL NFR BLD AUTO: 0.3 %
EOSINOPHIL NFR BLD AUTO: 0.6 %
EPEC EAE GENE STL QL NAA+NON-PROBE: NEGATIVE
ERYTHROCYTE [DISTWIDTH] IN BLOOD BY AUTOMATED COUNT: 14.5 % (ref 11–15)
ERYTHROCYTE [DISTWIDTH] IN BLOOD BY AUTOMATED COUNT: 14.5 % (ref 11–15)
ETEC LTA+ST1A+ST1B TOX ST NAA+NON-PROBE: NEGATIVE
GIARDIA LAMBLIA PCR: NEGATIVE
GLUCOSE BLD-MCNC: 96 MG/DL (ref 70–99)
HCT VFR BLD AUTO: 38.5 %
HCT VFR BLD AUTO: 47.1 %
HGB BLD-MCNC: 11.8 G/DL
HGB BLD-MCNC: 14.9 G/DL
IMM GRANULOCYTES # BLD AUTO: 0.07 X10(3) UL (ref 0–1)
IMM GRANULOCYTES NFR BLD: 0.6 %
IMM GRANULOCYTES NFR BLD: 1 %
LYMPHOCYTES # BLD AUTO: 1.01 X10(3) UL (ref 1–4)
LYMPHOCYTES # BLD AUTO: 1.09 X10(3) UL (ref 1–4)
LYMPHOCYTES NFR BLD AUTO: 10 %
LYMPHOCYTES NFR BLD AUTO: 14.4 %
MAGNESIUM SERPL-MCNC: 2.3 MG/DL (ref 1.6–2.6)
MCH RBC QN AUTO: 25.5 PG (ref 26–34)
MCH RBC QN AUTO: 25.6 PG (ref 26–34)
MCHC RBC AUTO-ENTMCNC: 30.6 G/DL (ref 31–37)
MCHC RBC AUTO-ENTMCNC: 31.6 G/DL (ref 31–37)
MCV RBC AUTO: 80.9 FL
MCV RBC AUTO: 83.2 FL
MONOCYTES # BLD AUTO: 0.48 X10(3) UL (ref 0.1–1)
MONOCYTES # BLD AUTO: 0.67 X10(3) UL (ref 0.1–1)
MONOCYTES NFR BLD AUTO: 4.4 %
MONOCYTES NFR BLD AUTO: 9.6 %
NEUTROPHILS # BLD AUTO: 5.2 X10 (3) UL (ref 1.5–7.7)
NEUTROPHILS # BLD AUTO: 5.2 X10(3) UL (ref 1.5–7.7)
NEUTROPHILS # BLD AUTO: 9.22 X10 (3) UL (ref 1.5–7.7)
NEUTROPHILS # BLD AUTO: 9.22 X10(3) UL (ref 1.5–7.7)
NEUTROPHILS NFR BLD AUTO: 74.1 %
NEUTROPHILS NFR BLD AUTO: 84.4 %
NOROVIRUS GI/GII PCR: POSITIVE
OSMOLALITY SERPL CALC.SUM OF ELEC: 295 MOSM/KG (ref 275–295)
P SHIGELLOIDES DNA STL QL NAA+PROBE: NEGATIVE
PLATELET # BLD AUTO: 253 10(3)UL (ref 150–450)
PLATELET # BLD AUTO: 337 10(3)UL (ref 150–450)
POTASSIUM SERPL-SCNC: 4.2 MMOL/L (ref 3.5–5.1)
RBC # BLD AUTO: 4.63 X10(6)UL
RBC # BLD AUTO: 5.82 X10(6)UL
ROTAVIRUS A PCR: NEGATIVE
SALMONELLA DNA SPEC QL NAA+PROBE: NEGATIVE
SAPOVIRUS PCR: NEGATIVE
SHIGELLA SP+EIEC IPAH ST NAA+NON-PROBE: NEGATIVE
SODIUM SERPL-SCNC: 143 MMOL/L (ref 136–145)
TROPONIN I HIGH SENSITIVITY: 5 NG/L
TROPONIN I HIGH SENSITIVITY: 5 NG/L
TSI SER-ACNC: 0.66 MIU/ML (ref 0.36–3.74)
V CHOLERAE DNA SPEC QL NAA+PROBE: NEGATIVE
VIBRIO DNA SPEC NAA+PROBE: NEGATIVE
WBC # BLD AUTO: 7 X10(3) UL (ref 4–11)
YERSINIA DNA SPEC NAA+PROBE: NEGATIVE

## 2022-03-02 PROCEDURE — 99291 CRITICAL CARE FIRST HOUR: CPT | Performed by: HOSPITALIST

## 2022-03-02 PROCEDURE — 93306 TTE W/DOPPLER COMPLETE: CPT | Performed by: HOSPITALIST

## 2022-03-02 RX ORDER — HEPARIN SODIUM 5000 [USP'U]/ML
7500 INJECTION, SOLUTION INTRAVENOUS; SUBCUTANEOUS EVERY 12 HOURS
Status: DISCONTINUED | OUTPATIENT
Start: 2022-03-02 | End: 2022-03-03

## 2022-03-02 RX ORDER — SODIUM CHLORIDE 9 MG/ML
1000 INJECTION, SOLUTION INTRAVENOUS ONCE
Status: COMPLETED | OUTPATIENT
Start: 2022-03-02 | End: 2022-03-02

## 2022-03-02 NOTE — ED QUICK NOTES
Orders for admission, patient is aware of plan and ready to go upstairs. Any questions, please call ED RN [address]  at extension [phone]. Type of COVID test sent:Rapid--neg  COVID Suspicion level: Low    Titratable drug(s) infusing:n/a  Rate: n/a    LOC at time of transport:A&Ox4    Other pertinent information: Given 2L of NS and IV zofran in ED. From home; normally independent with ADLs. Right lower extremity ortho surgery 1 week ago.     CIWA score= n/a  NIH score= n/a

## 2022-03-02 NOTE — SPIRITUAL CARE NOTE
RRT called as pt had chest pains. Medical team was able to stabilize pt. Pt is post-op R leg surgery and SOB. Son as at bedside. Pt requested a  visit with hayes later in the day.

## 2022-03-02 NOTE — ED QUICK NOTES
Assumed care of Shirin upon arrival in room 27 via [facility]. Patient A&Ox4, see triage note and nursing assessment. S/p ankle surgery on 02/25/22. Patient with mild sob, tachy 140s. IV present via EMS. All available specimens to lab and patient on continuous cardiac monitoring. Per MD Narvaez, brayan labs for CT PE. CT staff aware. Call button in reach and comfort measures provided. Will continue to monitor.

## 2022-03-02 NOTE — ED QUICK NOTES
Talked to [facility] control at this time. Symptoms unrelated to double dose of multi vitamins over the last week. Case closed at this time. Without any further recommendations. Call back with any further concerns.       Case number: [id number]

## 2022-03-02 NOTE — PLAN OF CARE
Problem: Patient Centered Care  Goal: Patient preferences are identified and integrated in the patient's plan of care  Description: Interventions:  - What would you like us to know as we care for you? Patient fell on ice a week ago  - Provide timely, complete, and accurate information to patient/family  - Incorporate patient and family knowledge, values, beliefs, and cultural backgrounds into the planning and delivery of care  - Encourage patient/family to participate in care and decision-making at the level they choose  - Honor patient and family perspectives and choices  Outcome: Progressing     Problem: Patient/Family Goals  Goal: Patient/Family Long Term Goal  Description: Patient's Long Term Goal: return home     Interventions:  -   - See additional Care Plan goals for specific interventions  Outcome: Progressing  Goal: Patient/Family Short Term Goal  Description: Patient's Short Term Goal: ***    Interventions:   - ***  - See additional Care Plan goals for specific interventions  Outcome: Progressing     Problem: SAFETY ADULT - FALL  Goal: Free from fall injury  Description: INTERVENTIONS:  - Assess pt frequently for physical needs  - Identify cognitive and physical deficits and behaviors that affect risk of falls.   - [place] fall precautions as indicated by assessment.  - Educate pt/family on patient safety including physical limitations  - Instruct pt to call for assistance with activity based on assessment  - Modify environment to reduce risk of injury  - Provide assistive devices as appropriate  - Consider OT/PT consult to assist with strengthening/mobility  - Encourage toileting schedule  Outcome: Progressing     Problem: PAIN - ADULT  Goal: Verbalizes/displays adequate comfort level or patient's stated pain goal  Description: INTERVENTIONS:  - Encourage pt to monitor pain and request assistance  - Assess pain using appropriate pain scale  - Administer analgesics based on type and severity of pain and evaluate response  - Implement non-pharmacological measures as appropriate and evaluate response  - Consider cultural and social influences on pain and pain management  - Manage/alleviate anxiety  - Utilize distraction and/or relaxation techniques  - Monitor for opioid side effects  - Notify MD/LIP if interventions unsuccessful or patient reports new pain  - Anticipate increased pain with activity and pre-medicate as appropriate  Outcome: Progressing     Problem: SKIN/TISSUE INTEGRITY - ADULT  Goal: Skin integrity remains intact  Description: INTERVENTIONS  - Assess and document risk factors for pressure ulcer development  - Assess and document skin integrity  - Monitor for areas of redness and/or skin breakdown  - Initiate interventions, skin care algorithm/standards of care as needed  Outcome: Progressing  Goal: Incision(s), wounds(s) or drain site(s) healing without S/S of infection  Description: INTERVENTIONS:  - Assess and document risk factors for pressure ulcer development  - Assess and document skin integrity  - Assess and document dressing/incision, wound bed, drain sites and surrounding tissue  - Implement wound care per orders  - Initiate isolation precautions as appropriate  - Initiate Pressure Ulcer prevention bundle as indicated  Outcome: Progressing  Goal: Oral mucous membranes remain intact  Description: INTERVENTIONS  - Assess oral mucosa and hygiene practices  - Implement preventative oral hygiene regimen  - Implement oral medicated treatments as ordered  Outcome: Progressing

## 2022-03-02 NOTE — PLAN OF CARE
Patient started complaining of chest pain to left side. Vital signs taken and stable. Rapid Response Team was called for chest pain and arrived. Dr. Gunner Mireles was present at RRT. Stat EKG was done, 2D Echo, and troponin levels were ordered per Dr. Gunner Mireles orders. Patient currently receiving intravenous fluids infusing and current time. Patient was administered scheduled aspirin and heparin injection as directed by Dr. Gunner Mireles. Patient will monitored for pain, safety, and comfort. Dr. Gunner Mireles is aware that patient heart rate tends to increase to 140's during  Activity while in the restroom. Call light within reach.

## 2022-03-02 NOTE — ED INITIAL ASSESSMENT (HPI)
Patient sent from [facility] for r/o PE after ankle surgery 1 week ago.  Now feeling short of breath, fatigue, n/v.

## 2022-03-02 NOTE — PLAN OF CARE
RRT    *See RRT Documentation Record*    Reason the RRT was called: Patient complained of left side chest pain. Assessment of patient leading up to RRT: Patient was in bathroom urinating, and no complaints of pain or discomfort at time prior. Interventions/Testing: Stat EKG done, 2D Echo ordered, troponin labs drawn, patient administered aspirin and heparin as scheduled per Dr. Gunner Mireles. Vital signs taken and stable. Patient refused pain medication. Patient Outcome/Disposition: Patient started feeling better and chest pain was resolved. Family Notified: yes  Name of family notified: Stone Leblanc, Spouse was present at bedside at the time of RRT.

## 2022-03-03 VITALS
DIASTOLIC BLOOD PRESSURE: 79 MMHG | SYSTOLIC BLOOD PRESSURE: 130 MMHG | RESPIRATION RATE: 18 BRPM | HEIGHT: 65 IN | WEIGHT: 244.38 LBS | OXYGEN SATURATION: 99 % | BODY MASS INDEX: 40.72 KG/M2 | HEART RATE: 88 BPM | TEMPERATURE: 98 F

## 2022-03-03 PROCEDURE — 99239 HOSP IP/OBS DSCHRG MGMT >30: CPT | Performed by: HOSPITALIST

## 2022-03-03 NOTE — PLAN OF CARE
Problem: Patient Centered Care  Goal: Patient preferences are identified and integrated in the patient's plan of care  Description: Interventions:  - What would you like us to know as we care for you? Pt is from home with .   - Provide timely, complete, and accurate information to patient/family  - Incorporate patient and family knowledge, values, beliefs, and cultural backgrounds into the planning and delivery of care  - Encourage patient/family to participate in care and decision-making at the level they choose  - Honor patient and family perspectives and choices  Outcome: Progressing     Problem: Patient/Family Goals  Goal: Patient/Family Long Term Goal  Description: Patient's Long Term Goal: To go home    Interventions:  - discharge planning  -monitor vital, labs  -assist w/ adl's  -give meds  - See additional Care Plan goals for specific interventions  Outcome: Progressing  Goal: Patient/Family Short Term Goal  Description: Patient's Short Term Goal: Feel better    Interventions:   - assist w/ adl's  -pain control  -monitor vitals, labs  - See additional Care Plan goals for specific interventions  Outcome: Progressing     Problem: SAFETY ADULT - FALL  Goal: Free from fall injury  Description: INTERVENTIONS:  - Assess pt frequently for physical needs  - Identify cognitive and physical deficits and behaviors that affect risk of falls.   - [place] fall precautions as indicated by assessment.  - Educate pt/family on patient safety including physical limitations  - Instruct pt to call for assistance with activity based on assessment  - Modify environment to reduce risk of injury  - Provide assistive devices as appropriate  - Consider OT/PT consult to assist with strengthening/mobility  - Encourage toileting schedule  Outcome: Progressing     Problem: SKIN/TISSUE INTEGRITY - ADULT  Goal: Skin integrity remains intact  Description: INTERVENTIONS  - Assess and document risk factors for pressure ulcer development  - Assess and document skin integrity  - Monitor for areas of redness and/or skin breakdown  - Initiate interventions, skin care algorithm/standards of care as needed  Outcome: Progressing  Goal: Incision(s), wounds(s) or drain site(s) healing without S/S of infection  Description: INTERVENTIONS:  - Assess and document risk factors for pressure ulcer development  - Assess and document skin integrity  - Assess and document dressing/incision, wound bed, drain sites and surrounding tissue  - Implement wound care per orders  - Initiate isolation precautions as appropriate  - Initiate Pressure Ulcer prevention bundle as indicated  Outcome: Progressing     Problem: PAIN - ADULT  Goal: Verbalizes/displays adequate comfort level or patient's stated pain goal  Description: INTERVENTIONS:  - Encourage pt to monitor pain and request assistance  - Assess pain using appropriate pain scale  - Administer analgesics based on type and severity of pain and evaluate response  - Implement non-pharmacological measures as appropriate and evaluate response  - Consider cultural and social influences on pain and pain management  - Manage/alleviate anxiety  - Utilize distraction and/or relaxation techniques  - Monitor for opioid side effects  - Notify MD/LIP if interventions unsuccessful or patient reports new pain  - Anticipate increased pain with activity and pre-medicate as appropriate  Outcome: Not Progressing   Medicate for pain, no acute changes overnight.

## 2022-03-03 NOTE — PLAN OF CARE
Patient resting in room. Discharge order in. IV removed by this RN. Discharge paperwork reviewed. Patient verbalizes understanding. Safety measures maintained. Problem: Patient Centered Care  Goal: Patient preferences are identified and integrated in the patient's plan of care  Description: Interventions:  - What would you like us to know as we care for you?   - Provide timely, complete, and accurate information to patient/family  - Incorporate patient and family knowledge, values, beliefs, and cultural backgrounds into the planning and delivery of care  - Encourage patient/family to participate in care and decision-making at the level they choose  - Honor patient and family perspectives and choices  Outcome: Completed     Problem: Patient/Family Goals  Goal: Patient/Family Long Term Goal  Description: Patient's Long Term Goal:       Interventions:  -   - See additional Care Plan goals for specific interventions  Outcome: Completed  Goal: Patient/Family Short Term Goal  Description: Patient's Short Term Goal:     Interventions:   -   - See additional Care Plan goals for specific interventions  Outcome: Completed     Problem: SAFETY ADULT - FALL  Goal: Free from fall injury  Description: INTERVENTIONS:  - Assess pt frequently for physical needs  - Identify cognitive and physical deficits and behaviors that affect risk of falls.   - [place] fall precautions as indicated by assessment.  - Educate pt/family on patient safety including physical limitations  - Instruct pt to call for assistance with activity based on assessment  - Modify environment to reduce risk of injury  - Provide assistive devices as appropriate  - Consider OT/PT consult to assist with strengthening/mobility  - Encourage toileting schedule  Outcome: Completed     Problem: PAIN - ADULT  Goal: Verbalizes/displays adequate comfort level or patient's stated pain goal  Description: INTERVENTIONS:  - Encourage pt to monitor pain and request assistance  - Assess pain using appropriate pain scale  - Administer analgesics based on type and severity of pain and evaluate response  - Implement non-pharmacological measures as appropriate and evaluate response  - Consider cultural and social influences on pain and pain management  - Manage/alleviate anxiety  - Utilize distraction and/or relaxation techniques  - Monitor for opioid side effects  - Notify MD/LIP if interventions unsuccessful or patient reports new pain  - Anticipate increased pain with activity and pre-medicate as appropriate  Outcome: Completed     Problem: SKIN/TISSUE INTEGRITY - ADULT  Goal: Skin integrity remains intact  Description: INTERVENTIONS  - Assess and document risk factors for pressure ulcer development  - Assess and document skin integrity  - Monitor for areas of redness and/or skin breakdown  - Initiate interventions, skin care algorithm/standards of care as needed  Outcome: Completed  Goal: Incision(s), wounds(s) or drain site(s) healing without S/S of infection  Description: INTERVENTIONS:  - Assess and document risk factors for pressure ulcer development  - Assess and document skin integrity  - Assess and document dressing/incision, wound bed, drain sites and surrounding tissue  - Implement wound care per orders  - Initiate isolation precautions as appropriate  - Initiate Pressure Ulcer prevention bundle as indicated  Outcome: Completed  Goal: Oral mucous membranes remain intact  Description: INTERVENTIONS  - Assess oral mucosa and hygiene practices  - Implement preventative oral hygiene regimen  - Implement oral medicated treatments as ordered  Outcome: Completed

## 2022-03-03 NOTE — PLAN OF CARE
Problem: Patient Centered Care  Goal: Patient preferences are identified and integrated in the patient's plan of care  Description: Interventions:  - What would you like us to know as we care for you?  - Provide timely, complete, and accurate information to patient/family  - Incorporate patient and family knowledge, values, beliefs, and cultural backgrounds into the planning and delivery of care  - Encourage patient/family to participate in care and decision-making at the level they choose  - Honor patient and family perspectives and choices  Outcome: Progressing     Problem: Patient/Family Goals  Goal: Patient/Family Long Term Goal  Description: Patient's Long Term Goal: Safe and free of falls! Interventions:  - Fall risk precautions are followed to ensure safety.  - Bed in low position. Rolling chair is use to take patient to bathroom. - Patient refused to have bed alarm on, but calls appropriately and waits for assistant to go to bathroom. - Call light within reach at all times. - See additional Care Plan goals for specific interventions  Outcome: Progressing  Goal: Patient/Family Short Term Goal  Description: Patient's Short Term Goal: Chest pain resolved! Interventions:   - Patient pain level is assess using pain scale from 1 to 10.  - Troponin levels were drawn.  - 2D Echo was done. - See additional Care Plan goals for specific interventions  Outcome: Progressing     Problem: SAFETY ADULT - FALL  Goal: Free from fall injury  Description: INTERVENTIONS:  - Assess pt frequently for physical needs  - Identify cognitive and physical deficits and behaviors that affect risk of falls.   - [place] fall precautions as indicated by assessment.  - Educate pt/family on patient safety including physical limitations  - Instruct pt to call for assistance with activity based on assessment  - Modify environment to reduce risk of injury  - Provide assistive devices as appropriate  - Consider OT/PT consult to assist with strengthening/mobility  - Encourage toileting schedule  Outcome: Progressing     Problem: PAIN - ADULT  Goal: Verbalizes/displays adequate comfort level or patient's stated pain goal  Description: INTERVENTIONS:  - Encourage pt to monitor pain and request assistance  - Assess pain using appropriate pain scale  - Administer analgesics based on type and severity of pain and evaluate response  - Implement non-pharmacological measures as appropriate and evaluate response  - Consider cultural and social influences on pain and pain management  - Manage/alleviate anxiety  - Utilize distraction and/or relaxation techniques  - Monitor for opioid side effects  - Notify MD/LIP if interventions unsuccessful or patient reports new pain  - Anticipate increased pain with activity and pre-medicate as appropriate  Outcome: Progressing     Problem: SKIN/TISSUE INTEGRITY - ADULT  Goal: Skin integrity remains intact  Description: INTERVENTIONS  - Assess and document risk factors for pressure ulcer development  - Assess and document skin integrity  - Monitor for areas of redness and/or skin breakdown  - Initiate interventions, skin care algorithm/standards of care as needed  Outcome: Progressing  Goal: Incision(s), wounds(s) or drain site(s) healing without S/S of infection  Description: INTERVENTIONS:  - Assess and document risk factors for pressure ulcer development  - Assess and document skin integrity  - Assess and document dressing/incision, wound bed, drain sites and surrounding tissue  - Implement wound care per orders  - Initiate isolation precautions as appropriate  - Initiate Pressure Ulcer prevention bundle as indicated  Outcome: Progressing  Goal: Oral mucous membranes remain intact  Description: INTERVENTIONS  - Assess oral mucosa and hygiene practices  - Implement preventative oral hygiene regimen  - Implement oral medicated treatments as ordered  Outcome: Progressing     Patient is presently resting in the bed. Alert x 4. Patient denied pain or discomfort at present time. Vital signs taken and stable. Fall risk precautions are followed to ensure safety. Rolling chair is use to transport patient to restroom. Staff with patient for safety. Enteric isolation precautions are followed per protocol due to patient stool being positive for Norovirus. Dr. Kevyn Zamora is aware of patient stool being positive for Norovirus. Tolerated diet well. 2D Echo was done. Maintains adequate hydration by intravenous and oral intake. Call light remains within reach at all times. Patient refuses to have bed alarm on, but calls appropriately for help and staff remains with patient at all times to ensure safety.

## 2022-03-15 ENCOUNTER — OFFICE VISIT (OUTPATIENT)
Dept: ORTHOPEDICS CLINIC | Facility: CLINIC | Age: 32
End: 2022-03-15

## 2022-03-15 ENCOUNTER — HOSPITAL ENCOUNTER (OUTPATIENT)
Dept: GENERAL RADIOLOGY | Facility: HOSPITAL | Age: 32
Discharge: HOME | End: 2022-03-15
Attending: ORTHOPAEDIC SURGERY
Payer: COMMERCIAL

## 2022-03-15 DIAGNOSIS — S93.431D ANKLE SYNDESMOSIS DISRUPTION, RIGHT, SUBSEQUENT ENCOUNTER: ICD-10-CM

## 2022-03-15 DIAGNOSIS — Z47.89 ORTHOPEDIC AFTERCARE: ICD-10-CM

## 2022-03-15 DIAGNOSIS — S82.851G CLOSED DISPLACED TRIMALLEOLAR FRACTURE OF RIGHT ANKLE WITH DELAYED HEALING: Primary | ICD-10-CM

## 2022-03-15 PROCEDURE — 99024 POSTOP FOLLOW-UP VISIT: CPT | Performed by: ORTHOPAEDIC SURGERY

## 2022-03-15 PROCEDURE — 73610 X-RAY EXAM OF ANKLE: CPT | Performed by: ORTHOPAEDIC SURGERY

## 2022-04-18 ENCOUNTER — TELEPHONE (OUTPATIENT)
Dept: PHYSICAL THERAPY | Facility: HOSPITAL | Age: 32
End: 2022-04-18

## 2022-04-18 ENCOUNTER — OFFICE VISIT (OUTPATIENT)
Dept: ORTHOPEDICS CLINIC | Facility: CLINIC | Age: 32
End: 2022-04-18
Payer: OTHER MISCELLANEOUS

## 2022-04-18 ENCOUNTER — HOSPITAL ENCOUNTER (OUTPATIENT)
Dept: GENERAL RADIOLOGY | Facility: HOSPITAL | Age: 32
Discharge: HOME | End: 2022-04-18
Attending: ORTHOPAEDIC SURGERY
Payer: COMMERCIAL

## 2022-04-18 DIAGNOSIS — S93.431D ANKLE SYNDESMOSIS DISRUPTION, RIGHT, SUBSEQUENT ENCOUNTER: ICD-10-CM

## 2022-04-18 DIAGNOSIS — Z47.89 ORTHOPEDIC AFTERCARE: ICD-10-CM

## 2022-04-18 DIAGNOSIS — S82.851G CLOSED DISPLACED TRIMALLEOLAR FRACTURE OF RIGHT ANKLE WITH DELAYED HEALING: Primary | ICD-10-CM

## 2022-04-18 DIAGNOSIS — E66.01 CLASS 3 SEVERE OBESITY DUE TO EXCESS CALORIES WITH SERIOUS COMORBIDITY AND BODY MASS INDEX (BMI) OF 40.0 TO 44.9 IN ADULT (HCC): ICD-10-CM

## 2022-04-18 PROCEDURE — 73610 X-RAY EXAM OF ANKLE: CPT | Performed by: ORTHOPAEDIC SURGERY

## 2022-04-18 PROCEDURE — 99024 POSTOP FOLLOW-UP VISIT: CPT | Performed by: ORTHOPAEDIC SURGERY

## 2022-04-19 ENCOUNTER — TELEPHONE (OUTPATIENT)
Dept: PHYSICAL THERAPY | Facility: HOSPITAL | Age: 32
End: 2022-04-19

## 2022-04-22 ENCOUNTER — OFFICE VISIT (OUTPATIENT)
Dept: PHYSICAL THERAPY | Facility: HOSPITAL | Age: 32
End: 2022-04-22
Attending: ORTHOPAEDIC SURGERY
Payer: OTHER MISCELLANEOUS

## 2022-04-22 DIAGNOSIS — S82.851G CLOSED DISPLACED TRIMALLEOLAR FRACTURE OF RIGHT ANKLE WITH DELAYED HEALING: ICD-10-CM

## 2022-04-22 DIAGNOSIS — S93.431D ANKLE SYNDESMOSIS DISRUPTION, RIGHT, SUBSEQUENT ENCOUNTER: ICD-10-CM

## 2022-04-22 PROCEDURE — 97161 PT EVAL LOW COMPLEX 20 MIN: CPT

## 2022-04-22 PROCEDURE — 97110 THERAPEUTIC EXERCISES: CPT

## 2022-04-22 PROCEDURE — 97140 MANUAL THERAPY 1/> REGIONS: CPT

## 2022-04-27 ENCOUNTER — OFFICE VISIT (OUTPATIENT)
Dept: PHYSICAL THERAPY | Facility: HOSPITAL | Age: 32
End: 2022-04-27
Attending: ORTHOPAEDIC SURGERY
Payer: OTHER MISCELLANEOUS

## 2022-04-27 DIAGNOSIS — S82.851G CLOSED DISPLACED TRIMALLEOLAR FRACTURE OF RIGHT ANKLE WITH DELAYED HEALING: ICD-10-CM

## 2022-04-27 DIAGNOSIS — S93.431D ANKLE SYNDESMOSIS DISRUPTION, RIGHT, SUBSEQUENT ENCOUNTER: ICD-10-CM

## 2022-04-27 PROCEDURE — 97140 MANUAL THERAPY 1/> REGIONS: CPT

## 2022-04-27 PROCEDURE — 97110 THERAPEUTIC EXERCISES: CPT

## 2022-05-04 ENCOUNTER — OFFICE VISIT (OUTPATIENT)
Dept: PHYSICAL THERAPY | Facility: HOSPITAL | Age: 32
End: 2022-05-04
Attending: ORTHOPAEDIC SURGERY
Payer: OTHER MISCELLANEOUS

## 2022-05-04 DIAGNOSIS — S93.431D ANKLE SYNDESMOSIS DISRUPTION, RIGHT, SUBSEQUENT ENCOUNTER: ICD-10-CM

## 2022-05-04 DIAGNOSIS — S82.851G CLOSED DISPLACED TRIMALLEOLAR FRACTURE OF RIGHT ANKLE WITH DELAYED HEALING: ICD-10-CM

## 2022-05-04 PROCEDURE — 97110 THERAPEUTIC EXERCISES: CPT

## 2022-05-04 PROCEDURE — 97140 MANUAL THERAPY 1/> REGIONS: CPT

## 2022-05-10 ENCOUNTER — OFFICE VISIT (OUTPATIENT)
Dept: PHYSICAL THERAPY | Facility: HOSPITAL | Age: 32
End: 2022-05-10
Attending: ORTHOPAEDIC SURGERY
Payer: OTHER MISCELLANEOUS

## 2022-05-10 DIAGNOSIS — S82.851G CLOSED DISPLACED TRIMALLEOLAR FRACTURE OF RIGHT ANKLE WITH DELAYED HEALING: ICD-10-CM

## 2022-05-10 DIAGNOSIS — S93.431D ANKLE SYNDESMOSIS DISRUPTION, RIGHT, SUBSEQUENT ENCOUNTER: ICD-10-CM

## 2022-05-10 PROCEDURE — 97140 MANUAL THERAPY 1/> REGIONS: CPT

## 2022-05-10 PROCEDURE — 97110 THERAPEUTIC EXERCISES: CPT

## 2022-05-12 ENCOUNTER — OFFICE VISIT (OUTPATIENT)
Dept: PHYSICAL THERAPY | Facility: HOSPITAL | Age: 32
End: 2022-05-12
Attending: ORTHOPAEDIC SURGERY
Payer: OTHER MISCELLANEOUS

## 2022-05-12 DIAGNOSIS — S82.851G CLOSED DISPLACED TRIMALLEOLAR FRACTURE OF RIGHT ANKLE WITH DELAYED HEALING: ICD-10-CM

## 2022-05-12 DIAGNOSIS — S93.431D ANKLE SYNDESMOSIS DISRUPTION, RIGHT, SUBSEQUENT ENCOUNTER: ICD-10-CM

## 2022-05-12 PROCEDURE — 97140 MANUAL THERAPY 1/> REGIONS: CPT

## 2022-05-12 PROCEDURE — 97110 THERAPEUTIC EXERCISES: CPT

## 2022-05-17 ENCOUNTER — OFFICE VISIT (OUTPATIENT)
Dept: PHYSICAL THERAPY | Facility: HOSPITAL | Age: 32
End: 2022-05-17
Attending: ORTHOPAEDIC SURGERY
Payer: OTHER MISCELLANEOUS

## 2022-05-17 ENCOUNTER — OFFICE VISIT (OUTPATIENT)
Dept: ORTHOPEDICS CLINIC | Facility: CLINIC | Age: 32
End: 2022-05-17
Payer: OTHER MISCELLANEOUS

## 2022-05-17 ENCOUNTER — HOSPITAL ENCOUNTER (OUTPATIENT)
Dept: GENERAL RADIOLOGY | Facility: HOSPITAL | Age: 32
Discharge: HOME | End: 2022-05-17
Attending: ORTHOPAEDIC SURGERY
Payer: COMMERCIAL

## 2022-05-17 VITALS — HEIGHT: 65 IN | WEIGHT: 244 LBS | BODY MASS INDEX: 40.65 KG/M2

## 2022-05-17 DIAGNOSIS — S93.431D ANKLE SYNDESMOSIS DISRUPTION, RIGHT, SUBSEQUENT ENCOUNTER: ICD-10-CM

## 2022-05-17 DIAGNOSIS — S82.851G CLOSED DISPLACED TRIMALLEOLAR FRACTURE OF RIGHT ANKLE WITH DELAYED HEALING: Primary | ICD-10-CM

## 2022-05-17 DIAGNOSIS — E66.01 CLASS 3 SEVERE OBESITY DUE TO EXCESS CALORIES WITH SERIOUS COMORBIDITY AND BODY MASS INDEX (BMI) OF 40.0 TO 44.9 IN ADULT (HCC): ICD-10-CM

## 2022-05-17 DIAGNOSIS — Z47.89 ORTHOPEDIC AFTERCARE: ICD-10-CM

## 2022-05-17 DIAGNOSIS — S82.851G CLOSED DISPLACED TRIMALLEOLAR FRACTURE OF RIGHT ANKLE WITH DELAYED HEALING: ICD-10-CM

## 2022-05-17 PROCEDURE — 73610 X-RAY EXAM OF ANKLE: CPT | Performed by: ORTHOPAEDIC SURGERY

## 2022-05-17 PROCEDURE — 99024 POSTOP FOLLOW-UP VISIT: CPT | Performed by: ORTHOPAEDIC SURGERY

## 2022-05-17 PROCEDURE — 97140 MANUAL THERAPY 1/> REGIONS: CPT

## 2022-05-17 PROCEDURE — 3008F BODY MASS INDEX DOCD: CPT | Performed by: ORTHOPAEDIC SURGERY

## 2022-05-17 PROCEDURE — 97110 THERAPEUTIC EXERCISES: CPT

## 2022-05-17 RX ORDER — AMOXICILLIN 500 MG/1
CAPSULE ORAL
COMMUNITY
Start: 2022-05-03

## 2022-05-19 ENCOUNTER — OFFICE VISIT (OUTPATIENT)
Dept: PHYSICAL THERAPY | Facility: HOSPITAL | Age: 32
End: 2022-05-19
Attending: ORTHOPAEDIC SURGERY
Payer: OTHER MISCELLANEOUS

## 2022-05-19 DIAGNOSIS — S93.431D ANKLE SYNDESMOSIS DISRUPTION, RIGHT, SUBSEQUENT ENCOUNTER: ICD-10-CM

## 2022-05-19 DIAGNOSIS — S82.851G CLOSED DISPLACED TRIMALLEOLAR FRACTURE OF RIGHT ANKLE WITH DELAYED HEALING: ICD-10-CM

## 2022-05-19 PROCEDURE — 97110 THERAPEUTIC EXERCISES: CPT

## 2022-05-19 PROCEDURE — 97140 MANUAL THERAPY 1/> REGIONS: CPT

## 2022-05-20 DIAGNOSIS — E03.8 HYPOTHYROIDISM DUE TO HASHIMOTO'S THYROIDITIS: ICD-10-CM

## 2022-05-20 DIAGNOSIS — E06.3 HYPOTHYROIDISM DUE TO HASHIMOTO'S THYROIDITIS: ICD-10-CM

## 2022-05-23 RX ORDER — LEVOTHYROXINE SODIUM 137 UG/1
137 TABLET ORAL
Qty: 30 TABLET | Refills: 2 | Status: SHIPPED | OUTPATIENT
Start: 2022-05-23

## 2022-05-23 NOTE — TELEPHONE ENCOUNTER
LOV: 12/20/21    RTC: 1 Year    FU: No FU Appt Scheduled    Last Refill: 10/20/21    3 Month Supply Pending

## 2022-05-24 ENCOUNTER — OFFICE VISIT (OUTPATIENT)
Dept: PHYSICAL THERAPY | Facility: HOSPITAL | Age: 32
End: 2022-05-24
Attending: ORTHOPAEDIC SURGERY
Payer: OTHER MISCELLANEOUS

## 2022-05-24 DIAGNOSIS — S93.431D ANKLE SYNDESMOSIS DISRUPTION, RIGHT, SUBSEQUENT ENCOUNTER: ICD-10-CM

## 2022-05-24 DIAGNOSIS — S82.851G CLOSED DISPLACED TRIMALLEOLAR FRACTURE OF RIGHT ANKLE WITH DELAYED HEALING: ICD-10-CM

## 2022-05-24 PROCEDURE — 97110 THERAPEUTIC EXERCISES: CPT

## 2022-05-26 ENCOUNTER — OFFICE VISIT (OUTPATIENT)
Dept: PHYSICAL THERAPY | Facility: HOSPITAL | Age: 32
End: 2022-05-26
Attending: ORTHOPAEDIC SURGERY
Payer: OTHER MISCELLANEOUS

## 2022-05-26 PROCEDURE — 97110 THERAPEUTIC EXERCISES: CPT

## 2022-06-01 ENCOUNTER — OFFICE VISIT (OUTPATIENT)
Dept: PHYSICAL THERAPY | Facility: HOSPITAL | Age: 32
End: 2022-06-01
Attending: ORTHOPAEDIC SURGERY
Payer: OTHER MISCELLANEOUS

## 2022-06-01 PROCEDURE — 97110 THERAPEUTIC EXERCISES: CPT

## 2022-06-01 PROCEDURE — 97140 MANUAL THERAPY 1/> REGIONS: CPT

## 2022-06-03 ENCOUNTER — OFFICE VISIT (OUTPATIENT)
Dept: PHYSICAL THERAPY | Facility: HOSPITAL | Age: 32
End: 2022-06-03
Attending: ORTHOPAEDIC SURGERY
Payer: OTHER MISCELLANEOUS

## 2022-06-03 PROCEDURE — 97140 MANUAL THERAPY 1/> REGIONS: CPT

## 2022-06-03 PROCEDURE — 97110 THERAPEUTIC EXERCISES: CPT

## 2022-06-03 PROCEDURE — 97112 NEUROMUSCULAR REEDUCATION: CPT

## 2022-06-07 ENCOUNTER — OFFICE VISIT (OUTPATIENT)
Dept: PHYSICAL THERAPY | Facility: HOSPITAL | Age: 32
End: 2022-06-07
Attending: ORTHOPAEDIC SURGERY
Payer: OTHER MISCELLANEOUS

## 2022-06-07 PROCEDURE — 97110 THERAPEUTIC EXERCISES: CPT

## 2022-06-07 PROCEDURE — 97140 MANUAL THERAPY 1/> REGIONS: CPT

## 2022-06-09 ENCOUNTER — OFFICE VISIT (OUTPATIENT)
Dept: PHYSICAL THERAPY | Facility: HOSPITAL | Age: 32
End: 2022-06-09
Attending: ORTHOPAEDIC SURGERY
Payer: OTHER MISCELLANEOUS

## 2022-06-09 PROCEDURE — 97110 THERAPEUTIC EXERCISES: CPT

## 2022-06-09 PROCEDURE — 97140 MANUAL THERAPY 1/> REGIONS: CPT

## 2022-06-14 ENCOUNTER — APPOINTMENT (OUTPATIENT)
Dept: PHYSICAL THERAPY | Facility: HOSPITAL | Age: 32
End: 2022-06-14
Attending: ORTHOPAEDIC SURGERY
Payer: OTHER MISCELLANEOUS

## 2022-06-16 ENCOUNTER — OFFICE VISIT (OUTPATIENT)
Dept: PHYSICAL THERAPY | Facility: HOSPITAL | Age: 32
End: 2022-06-16
Attending: ORTHOPAEDIC SURGERY
Payer: OTHER MISCELLANEOUS

## 2022-06-16 PROCEDURE — 97110 THERAPEUTIC EXERCISES: CPT

## 2022-06-16 PROCEDURE — 97140 MANUAL THERAPY 1/> REGIONS: CPT

## 2022-06-21 ENCOUNTER — OFFICE VISIT (OUTPATIENT)
Dept: PHYSICAL THERAPY | Facility: HOSPITAL | Age: 32
End: 2022-06-21
Attending: ORTHOPAEDIC SURGERY
Payer: OTHER MISCELLANEOUS

## 2022-06-21 PROCEDURE — 97110 THERAPEUTIC EXERCISES: CPT

## 2022-06-21 PROCEDURE — 97140 MANUAL THERAPY 1/> REGIONS: CPT

## 2022-06-23 ENCOUNTER — OFFICE VISIT (OUTPATIENT)
Dept: PHYSICAL THERAPY | Facility: HOSPITAL | Age: 32
End: 2022-06-23
Attending: ORTHOPAEDIC SURGERY
Payer: OTHER MISCELLANEOUS

## 2022-06-23 PROCEDURE — 97140 MANUAL THERAPY 1/> REGIONS: CPT

## 2022-06-23 PROCEDURE — 97110 THERAPEUTIC EXERCISES: CPT

## 2022-06-28 ENCOUNTER — OFFICE VISIT (OUTPATIENT)
Dept: PHYSICAL THERAPY | Facility: HOSPITAL | Age: 32
End: 2022-06-28
Attending: ORTHOPAEDIC SURGERY
Payer: OTHER MISCELLANEOUS

## 2022-06-28 PROCEDURE — 97140 MANUAL THERAPY 1/> REGIONS: CPT

## 2022-06-28 PROCEDURE — 97110 THERAPEUTIC EXERCISES: CPT

## 2022-08-09 ENCOUNTER — OFFICE VISIT (OUTPATIENT)
Dept: PHYSICAL THERAPY | Facility: HOSPITAL | Age: 32
End: 2022-08-09
Attending: ORTHOPAEDIC SURGERY
Payer: OTHER MISCELLANEOUS

## 2022-08-09 PROCEDURE — 97110 THERAPEUTIC EXERCISES: CPT

## 2022-08-09 PROCEDURE — 97140 MANUAL THERAPY 1/> REGIONS: CPT

## 2022-08-10 ENCOUNTER — TELEPHONE (OUTPATIENT)
Dept: PHYSICAL THERAPY | Facility: HOSPITAL | Age: 32
End: 2022-08-10

## 2022-08-11 ENCOUNTER — APPOINTMENT (OUTPATIENT)
Dept: PHYSICAL THERAPY | Facility: HOSPITAL | Age: 32
End: 2022-08-11
Attending: ORTHOPAEDIC SURGERY
Payer: OTHER MISCELLANEOUS

## 2022-08-15 ENCOUNTER — TELEPHONE (OUTPATIENT)
Dept: PHYSICAL THERAPY | Facility: HOSPITAL | Age: 32
End: 2022-08-15

## 2022-08-16 ENCOUNTER — APPOINTMENT (OUTPATIENT)
Dept: PHYSICAL THERAPY | Facility: HOSPITAL | Age: 32
End: 2022-08-16
Attending: ORTHOPAEDIC SURGERY
Payer: OTHER MISCELLANEOUS

## 2022-08-17 ENCOUNTER — TELEPHONE (OUTPATIENT)
Dept: PHYSICAL THERAPY | Facility: HOSPITAL | Age: 32
End: 2022-08-17

## 2022-08-18 ENCOUNTER — APPOINTMENT (OUTPATIENT)
Dept: PHYSICAL THERAPY | Facility: HOSPITAL | Age: 32
End: 2022-08-18
Attending: ORTHOPAEDIC SURGERY
Payer: OTHER MISCELLANEOUS

## 2022-08-22 ENCOUNTER — TELEPHONE (OUTPATIENT)
Dept: PHYSICAL THERAPY | Facility: HOSPITAL | Age: 32
End: 2022-08-22

## 2022-08-23 ENCOUNTER — APPOINTMENT (OUTPATIENT)
Dept: PHYSICAL THERAPY | Facility: HOSPITAL | Age: 32
End: 2022-08-23
Attending: ORTHOPAEDIC SURGERY
Payer: OTHER MISCELLANEOUS

## 2022-08-23 ENCOUNTER — TELEPHONE (OUTPATIENT)
Dept: PHYSICAL THERAPY | Facility: HOSPITAL | Age: 32
End: 2022-08-23

## 2022-08-31 ENCOUNTER — OFFICE VISIT (OUTPATIENT)
Dept: INTERNAL MEDICINE CLINIC | Facility: CLINIC | Age: 32
End: 2022-08-31
Payer: COMMERCIAL

## 2022-08-31 VITALS
WEIGHT: 244 LBS | TEMPERATURE: 100 F | OXYGEN SATURATION: 99 % | DIASTOLIC BLOOD PRESSURE: 70 MMHG | HEART RATE: 83 BPM | SYSTOLIC BLOOD PRESSURE: 110 MMHG | BODY MASS INDEX: 41 KG/M2

## 2022-08-31 DIAGNOSIS — R53.82 CHRONIC FATIGUE: ICD-10-CM

## 2022-08-31 DIAGNOSIS — Z00.00 WELLNESS EXAMINATION: Primary | ICD-10-CM

## 2022-08-31 DIAGNOSIS — E89.0 HYPOTHYROIDISM, POSTABLATIVE: ICD-10-CM

## 2022-08-31 PROCEDURE — 3074F SYST BP LT 130 MM HG: CPT | Performed by: FAMILY MEDICINE

## 2022-08-31 PROCEDURE — 99395 PREV VISIT EST AGE 18-39: CPT | Performed by: FAMILY MEDICINE

## 2022-08-31 PROCEDURE — 99212 OFFICE O/P EST SF 10 MIN: CPT | Performed by: FAMILY MEDICINE

## 2022-08-31 PROCEDURE — 3078F DIAST BP <80 MM HG: CPT | Performed by: FAMILY MEDICINE

## 2022-09-06 ENCOUNTER — APPOINTMENT (OUTPATIENT)
Dept: PHYSICAL THERAPY | Facility: HOSPITAL | Age: 32
End: 2022-09-06
Attending: ORTHOPAEDIC SURGERY
Payer: COMMERCIAL

## 2022-09-20 DIAGNOSIS — E03.8 HYPOTHYROIDISM DUE TO HASHIMOTO'S THYROIDITIS: ICD-10-CM

## 2022-09-20 DIAGNOSIS — E06.3 HYPOTHYROIDISM DUE TO HASHIMOTO'S THYROIDITIS: ICD-10-CM

## 2022-09-21 RX ORDER — LEVOTHYROXINE SODIUM 137 UG/1
137 TABLET ORAL
Qty: 30 TABLET | Refills: 2 | Status: SHIPPED | OUTPATIENT
Start: 2022-09-21

## 2022-09-21 NOTE — TELEPHONE ENCOUNTER
LOV 12/20/21. RTC 1 year. No f/u. Sent [id number] reminder.  Pended 3 month supply

## 2022-09-24 ENCOUNTER — LAB ENCOUNTER (OUTPATIENT)
Dept: LAB | Facility: HOSPITAL | Age: 32
End: 2022-09-24
Attending: FAMILY MEDICINE

## 2022-09-24 DIAGNOSIS — Z00.00 WELLNESS EXAMINATION: ICD-10-CM

## 2022-09-24 LAB
ALBUMIN SERPL-MCNC: 3.4 G/DL (ref 3.4–5)
ALBUMIN/GLOB SERPL: 0.8 {RATIO} (ref 1–2)
ALP LIVER SERPL-CCNC: 69 U/L
ALT SERPL-CCNC: 23 U/L
ANION GAP SERPL CALC-SCNC: 6 MMOL/L (ref 0–18)
AST SERPL-CCNC: 13 U/L (ref 15–37)
BASOPHILS # BLD AUTO: 0.05 X10(3) UL (ref 0–0.2)
BASOPHILS NFR BLD AUTO: 0.7 %
BILIRUB SERPL-MCNC: 0.6 MG/DL (ref 0.1–2)
BILIRUB UR QL: NEGATIVE
BUN BLD-MCNC: 11 MG/DL (ref 7–18)
BUN/CREAT SERPL: 16.4 (ref 10–20)
CALCIUM BLD-MCNC: 8.8 MG/DL (ref 8.5–10.1)
CHLORIDE SERPL-SCNC: 107 MMOL/L (ref 98–112)
CHOLEST SERPL-MCNC: 170 MG/DL (ref ?–200)
CLARITY UR: CLEAR
CO2 SERPL-SCNC: 27 MMOL/L (ref 21–32)
COLOR UR: YELLOW
CREAT BLD-MCNC: 0.67 MG/DL
DEPRECATED RDW RBC AUTO: 42.5 FL (ref 35.1–46.3)
EOSINOPHIL # BLD AUTO: 0.13 X10(3) UL (ref 0–0.7)
EOSINOPHIL NFR BLD AUTO: 1.8 %
ERYTHROCYTE [DISTWIDTH] IN BLOOD BY AUTOMATED COUNT: 13.7 % (ref 11–15)
EST. AVERAGE GLUCOSE BLD GHB EST-MCNC: 111 MG/DL (ref 68–126)
FASTING PATIENT LIPID ANSWER: YES
FASTING STATUS PATIENT QL REPORTED: YES
GFR SERPLBLD BASED ON 1.73 SQ M-ARVRAT: 121 ML/MIN/1.73M2 (ref 60–?)
GLOBULIN PLAS-MCNC: 4.1 G/DL (ref 2.8–4.4)
GLUCOSE BLD-MCNC: 86 MG/DL (ref 70–99)
GLUCOSE UR-MCNC: NEGATIVE MG/DL
HBA1C MFR BLD: 5.5 % (ref ?–5.7)
HCT VFR BLD AUTO: 42.4 %
HDLC SERPL-MCNC: 45 MG/DL (ref 40–59)
HGB BLD-MCNC: 13.3 G/DL
IMM GRANULOCYTES # BLD AUTO: 0.02 X10(3) UL (ref 0–1)
IMM GRANULOCYTES NFR BLD: 0.3 %
KETONES UR-MCNC: NEGATIVE MG/DL
LDLC SERPL CALC-MCNC: 96 MG/DL (ref ?–100)
LEUKOCYTE ESTERASE UR QL STRIP.AUTO: NEGATIVE
LYMPHOCYTES # BLD AUTO: 2.24 X10(3) UL (ref 1–4)
LYMPHOCYTES NFR BLD AUTO: 30.6 %
MCH RBC QN AUTO: 26.3 PG (ref 26–34)
MCHC RBC AUTO-ENTMCNC: 31.4 G/DL (ref 31–37)
MCV RBC AUTO: 84 FL
MONOCYTES # BLD AUTO: 0.52 X10(3) UL (ref 0.1–1)
MONOCYTES NFR BLD AUTO: 7.1 %
NEUTROPHILS # BLD AUTO: 4.37 X10 (3) UL (ref 1.5–7.7)
NEUTROPHILS # BLD AUTO: 4.37 X10(3) UL (ref 1.5–7.7)
NEUTROPHILS NFR BLD AUTO: 59.5 %
NITRITE UR QL STRIP.AUTO: NEGATIVE
NONHDLC SERPL-MCNC: 125 MG/DL (ref ?–130)
OSMOLALITY SERPL CALC.SUM OF ELEC: 289 MOSM/KG (ref 275–295)
PH UR: 7 [PH] (ref 5–8)
PLATELET # BLD AUTO: 260 10(3)UL (ref 150–450)
POTASSIUM SERPL-SCNC: 4.5 MMOL/L (ref 3.5–5.1)
PROT SERPL-MCNC: 7.5 G/DL (ref 6.4–8.2)
PROT UR-MCNC: NEGATIVE MG/DL
RBC # BLD AUTO: 5.05 X10(6)UL
SODIUM SERPL-SCNC: 140 MMOL/L (ref 136–145)
SP GR UR STRIP: 1.02 (ref 1–1.03)
T4 FREE SERPL-MCNC: 0.9 NG/DL (ref 0.8–1.7)
TRIGL SERPL-MCNC: 165 MG/DL (ref 30–149)
TSI SER-ACNC: 6.15 MIU/ML (ref 0.36–3.74)
UROBILINOGEN UR STRIP-ACNC: 0.2
VIT D+METAB SERPL-MCNC: 10.4 NG/ML (ref 30–100)
VLDLC SERPL CALC-MCNC: 27 MG/DL (ref 0–30)
WBC # BLD AUTO: 7.3 X10(3) UL (ref 4–11)

## 2022-09-24 PROCEDURE — 83036 HEMOGLOBIN GLYCOSYLATED A1C: CPT

## 2022-09-24 PROCEDURE — 36415 COLL VENOUS BLD VENIPUNCTURE: CPT

## 2022-09-24 PROCEDURE — 82306 VITAMIN D 25 HYDROXY: CPT

## 2022-09-24 PROCEDURE — 84443 ASSAY THYROID STIM HORMONE: CPT

## 2022-09-24 PROCEDURE — 80053 COMPREHEN METABOLIC PANEL: CPT

## 2022-09-24 PROCEDURE — 80061 LIPID PANEL: CPT

## 2022-09-24 PROCEDURE — 81015 MICROSCOPIC EXAM OF URINE: CPT

## 2022-09-24 PROCEDURE — 85025 COMPLETE CBC W/AUTO DIFF WBC: CPT | Performed by: FAMILY MEDICINE

## 2022-09-24 PROCEDURE — 84439 ASSAY OF FREE THYROXINE: CPT

## 2022-09-24 PROCEDURE — 81001 URINALYSIS AUTO W/SCOPE: CPT

## 2022-10-04 DIAGNOSIS — E55.9 VITAMIN D DEFICIENCY: ICD-10-CM

## 2022-10-05 RX ORDER — ERGOCALCIFEROL 1.25 MG/1
CAPSULE ORAL
Qty: 12 CAPSULE | Refills: 0 | OUTPATIENT
Start: 2022-10-05

## 2022-11-01 ENCOUNTER — TELEPHONE (OUTPATIENT)
Dept: INTERNAL MEDICINE CLINIC | Facility: CLINIC | Age: 32
End: 2022-11-01

## 2022-11-01 NOTE — TELEPHONE ENCOUNTER
Called patient she states she did see mychart message and is aware that labs need to be rechecked in 3 months

## 2022-11-01 NOTE — TELEPHONE ENCOUNTER
----- Message from Florinda Potts MD sent at 10/27/2022  2:09 PM CDT -----  Has not seen my [employer] message.  Please contact with the results

## 2022-12-08 ENCOUNTER — OFFICE VISIT (OUTPATIENT)
Dept: OBGYN CLINIC | Facility: CLINIC | Age: 32
End: 2022-12-08
Payer: COMMERCIAL

## 2022-12-08 VITALS
HEART RATE: 81 BPM | BODY MASS INDEX: 40 KG/M2 | SYSTOLIC BLOOD PRESSURE: 115 MMHG | DIASTOLIC BLOOD PRESSURE: 80 MMHG | WEIGHT: 240 LBS

## 2022-12-08 DIAGNOSIS — Z30.432 ENCOUNTER FOR IUD REMOVAL: Primary | ICD-10-CM

## 2022-12-08 PROCEDURE — 3079F DIAST BP 80-89 MM HG: CPT | Performed by: OBSTETRICS & GYNECOLOGY

## 2022-12-08 PROCEDURE — 58301 REMOVE INTRAUTERINE DEVICE: CPT | Performed by: OBSTETRICS & GYNECOLOGY

## 2022-12-08 PROCEDURE — 3074F SYST BP LT 130 MM HG: CPT | Performed by: OBSTETRICS & GYNECOLOGY

## 2022-12-08 NOTE — PROCEDURES
IUD Removal     Consent signed. Procedure discussed with the patient in detail including indication, risks, benefits, alternatives and complications. Pelvic Exam Findings:  Lesion description:  IUD strings seen from cervix    Procedure:  Speculum placed in the vagina. An Allis clamp used to grasp IUD strings. Mirena IUD was removed without difficulty. The patient tolerated the procedure well. Visit Plan:  Discharge instructions were reviewed with the patient.     Past due for annual exam.  F/u rec'd

## 2022-12-20 DIAGNOSIS — E03.8 HYPOTHYROIDISM DUE TO HASHIMOTO'S THYROIDITIS: ICD-10-CM

## 2022-12-20 DIAGNOSIS — E06.3 HYPOTHYROIDISM DUE TO HASHIMOTO'S THYROIDITIS: ICD-10-CM

## 2022-12-21 RX ORDER — LEVOTHYROXINE SODIUM 137 UG/1
TABLET ORAL
Qty: 30 TABLET | Refills: 2 | Status: SHIPPED | OUTPATIENT
Start: 2022-12-21

## 2023-01-23 ENCOUNTER — LAB ENCOUNTER (OUTPATIENT)
Dept: LAB | Facility: HOSPITAL | Age: 33
End: 2023-01-23
Attending: FAMILY MEDICINE
Payer: COMMERCIAL

## 2023-01-23 DIAGNOSIS — E89.0 HYPOTHYROIDISM, POSTABLATIVE: ICD-10-CM

## 2023-01-23 DIAGNOSIS — E55.9 VITAMIN D DEFICIENCY: ICD-10-CM

## 2023-01-23 LAB
T4 FREE SERPL-MCNC: 0.8 NG/DL (ref 0.8–1.7)
TSI SER-ACNC: 25 MIU/ML (ref 0.36–3.74)

## 2023-01-23 PROCEDURE — 82306 VITAMIN D 25 HYDROXY: CPT

## 2023-01-23 PROCEDURE — 84443 ASSAY THYROID STIM HORMONE: CPT

## 2023-01-23 PROCEDURE — 84439 ASSAY OF FREE THYROXINE: CPT

## 2023-01-23 PROCEDURE — 36415 COLL VENOUS BLD VENIPUNCTURE: CPT

## 2023-01-24 LAB — VIT D+METAB SERPL-MCNC: 27.6 NG/ML (ref 30–100)

## 2023-04-03 ENCOUNTER — LAB ENCOUNTER (OUTPATIENT)
Dept: LAB | Facility: HOSPITAL | Age: 33
End: 2023-04-03
Attending: FAMILY MEDICINE
Payer: COMMERCIAL

## 2023-04-03 DIAGNOSIS — E89.0 HYPOTHYROIDISM, POSTABLATIVE: ICD-10-CM

## 2023-04-03 LAB — TSI SER-ACNC: 0.95 MIU/ML (ref 0.36–3.74)

## 2023-04-03 PROCEDURE — 84443 ASSAY THYROID STIM HORMONE: CPT | Performed by: FAMILY MEDICINE

## 2023-05-01 ENCOUNTER — HOSPITAL ENCOUNTER (EMERGENCY)
Facility: HOSPITAL | Age: 33
Discharge: HOME | End: 2023-05-01
Attending: EMERGENCY MEDICINE
Payer: COMMERCIAL

## 2023-05-01 ENCOUNTER — APPOINTMENT (OUTPATIENT)
Dept: GENERAL RADIOLOGY | Facility: HOSPITAL | Age: 33
End: 2023-05-01
Payer: COMMERCIAL

## 2023-05-01 VITALS
WEIGHT: 239 LBS | OXYGEN SATURATION: 98 % | SYSTOLIC BLOOD PRESSURE: 128 MMHG | RESPIRATION RATE: 18 BRPM | DIASTOLIC BLOOD PRESSURE: 99 MMHG | HEART RATE: 88 BPM | TEMPERATURE: 99 F | HEIGHT: 65 IN | BODY MASS INDEX: 39.82 KG/M2

## 2023-05-01 DIAGNOSIS — F41.9 ANXIETY: ICD-10-CM

## 2023-05-01 DIAGNOSIS — R00.2 PALPITATIONS: Primary | ICD-10-CM

## 2023-05-01 LAB
ALBUMIN SERPL-MCNC: 3.9 G/DL (ref 3.4–5)
ALBUMIN/GLOB SERPL: 0.9 {RATIO} (ref 1–2)
ALP LIVER SERPL-CCNC: 71 U/L
ALT SERPL-CCNC: 34 U/L
ANION GAP SERPL CALC-SCNC: 8 MMOL/L (ref 0–18)
AST SERPL-CCNC: 17 U/L (ref 15–37)
B-HCG UR QL: NEGATIVE
BASOPHILS # BLD AUTO: 0.04 X10(3) UL (ref 0–0.2)
BASOPHILS NFR BLD AUTO: 0.4 %
BILIRUB SERPL-MCNC: 0.5 MG/DL (ref 0.1–2)
BILIRUB UR QL: NEGATIVE
BUN BLD-MCNC: 13 MG/DL (ref 7–18)
BUN/CREAT SERPL: 16 (ref 10–20)
CALCIUM BLD-MCNC: 9.4 MG/DL (ref 8.5–10.1)
CHLORIDE SERPL-SCNC: 109 MMOL/L (ref 98–112)
CLARITY UR: CLEAR
CO2 SERPL-SCNC: 24 MMOL/L (ref 21–32)
COLOR UR: YELLOW
CREAT BLD-MCNC: 0.81 MG/DL
DEPRECATED RDW RBC AUTO: 39.7 FL (ref 35.1–46.3)
EOSINOPHIL # BLD AUTO: 0.08 X10(3) UL (ref 0–0.7)
EOSINOPHIL NFR BLD AUTO: 0.8 %
ERYTHROCYTE [DISTWIDTH] IN BLOOD BY AUTOMATED COUNT: 13.4 % (ref 11–15)
GFR SERPLBLD BASED ON 1.73 SQ M-ARVRAT: 99 ML/MIN/1.73M2 (ref 60–?)
GLOBULIN PLAS-MCNC: 4.5 G/DL (ref 2.8–4.4)
GLUCOSE BLD-MCNC: 102 MG/DL (ref 70–99)
GLUCOSE UR-MCNC: NORMAL MG/DL
HCT VFR BLD AUTO: 43.5 %
HGB BLD-MCNC: 13.7 G/DL
HGB UR QL STRIP.AUTO: NEGATIVE
IMM GRANULOCYTES # BLD AUTO: 0.05 X10(3) UL (ref 0–1)
IMM GRANULOCYTES NFR BLD: 0.5 %
KETONES UR-MCNC: NEGATIVE MG/DL
LEUKOCYTE ESTERASE UR QL STRIP.AUTO: 25
LYMPHOCYTES # BLD AUTO: 2.42 X10(3) UL (ref 1–4)
LYMPHOCYTES NFR BLD AUTO: 23 %
MCH RBC QN AUTO: 25.7 PG (ref 26–34)
MCHC RBC AUTO-ENTMCNC: 31.5 G/DL (ref 31–37)
MCV RBC AUTO: 81.5 FL
MONOCYTES # BLD AUTO: 0.81 X10(3) UL (ref 0.1–1)
MONOCYTES NFR BLD AUTO: 7.7 %
NEUTROPHILS # BLD AUTO: 7.12 X10 (3) UL (ref 1.5–7.7)
NEUTROPHILS # BLD AUTO: 7.12 X10(3) UL (ref 1.5–7.7)
NEUTROPHILS NFR BLD AUTO: 67.6 %
NITRITE UR QL STRIP.AUTO: NEGATIVE
OSMOLALITY SERPL CALC.SUM OF ELEC: 292 MOSM/KG (ref 275–295)
PH UR: 5.5 [PH] (ref 5–8)
PLATELET # BLD AUTO: 291 10(3)UL (ref 150–450)
POTASSIUM SERPL-SCNC: 4.5 MMOL/L (ref 3.5–5.1)
PROT SERPL-MCNC: 8.4 G/DL (ref 6.4–8.2)
PROT UR-MCNC: 20 MG/DL
RBC # BLD AUTO: 5.34 X10(6)UL
SODIUM SERPL-SCNC: 141 MMOL/L (ref 136–145)
SP GR UR STRIP: 1.03 (ref 1–1.03)
TROPONIN I HIGH SENSITIVITY: 3 NG/L
UROBILINOGEN UR STRIP-ACNC: NORMAL
WBC # BLD AUTO: 10.5 X10(3) UL (ref 4–11)

## 2023-05-01 PROCEDURE — 87086 URINE CULTURE/COLONY COUNT: CPT | Performed by: EMERGENCY MEDICINE

## 2023-05-01 PROCEDURE — 85025 COMPLETE CBC W/AUTO DIFF WBC: CPT

## 2023-05-01 PROCEDURE — 81025 URINE PREGNANCY TEST: CPT

## 2023-05-01 PROCEDURE — 85025 COMPLETE CBC W/AUTO DIFF WBC: CPT | Performed by: EMERGENCY MEDICINE

## 2023-05-01 PROCEDURE — 93005 ELECTROCARDIOGRAM TRACING: CPT

## 2023-05-01 PROCEDURE — 71045 X-RAY EXAM CHEST 1 VIEW: CPT

## 2023-05-01 PROCEDURE — 80053 COMPREHEN METABOLIC PANEL: CPT

## 2023-05-01 PROCEDURE — 36415 COLL VENOUS BLD VENIPUNCTURE: CPT

## 2023-05-01 PROCEDURE — 84484 ASSAY OF TROPONIN QUANT: CPT

## 2023-05-01 PROCEDURE — 81001 URINALYSIS AUTO W/SCOPE: CPT | Performed by: EMERGENCY MEDICINE

## 2023-05-01 PROCEDURE — 99285 EMERGENCY DEPT VISIT HI MDM: CPT

## 2023-05-01 PROCEDURE — 84484 ASSAY OF TROPONIN QUANT: CPT | Performed by: EMERGENCY MEDICINE

## 2023-05-01 PROCEDURE — 99284 EMERGENCY DEPT VISIT MOD MDM: CPT

## 2023-05-01 PROCEDURE — 93010 ELECTROCARDIOGRAM REPORT: CPT

## 2023-05-01 PROCEDURE — 80053 COMPREHEN METABOLIC PANEL: CPT | Performed by: EMERGENCY MEDICINE

## 2023-05-01 RX ORDER — HYDROXYZINE HYDROCHLORIDE 25 MG/1
TABLET, FILM COATED ORAL EVERY 4 HOURS PRN
Qty: 60 TABLET | Refills: 0 | Status: SHIPPED | OUTPATIENT
Start: 2023-05-01 | End: 2023-06-30

## 2023-05-01 NOTE — ED INITIAL ASSESSMENT (HPI)
Pt reports \"Over the weekend I have been having a hard time taking a deep breath. My heart feels like it stalls a bit. When I was eating I got dizzy\". Pt reports midsternal chest pain that is intermittent. Pt reports palpitations. Denies taking birth control, recent travel, or leg pain.  Denies cough/ fevers/ n/v.

## 2023-05-02 LAB
ATRIAL RATE: 70 BPM
P AXIS: 49 DEGREES
P-R INTERVAL: 152 MS
Q-T INTERVAL: 398 MS
QRS DURATION: 66 MS
QTC CALCULATION (BEZET): 429 MS
R AXIS: 59 DEGREES
T AXIS: 0 DEGREES
VENTRICULAR RATE: 70 BPM

## 2023-05-11 ENCOUNTER — OFFICE VISIT (OUTPATIENT)
Dept: INTERNAL MEDICINE CLINIC | Facility: CLINIC | Age: 33
End: 2023-05-11
Payer: COMMERCIAL

## 2023-05-11 VITALS
OXYGEN SATURATION: 97 % | BODY MASS INDEX: 40 KG/M2 | SYSTOLIC BLOOD PRESSURE: 98 MMHG | WEIGHT: 238 LBS | TEMPERATURE: 98 F | HEART RATE: 81 BPM | DIASTOLIC BLOOD PRESSURE: 60 MMHG

## 2023-05-11 DIAGNOSIS — R06.81 APNEIC EPISODE: ICD-10-CM

## 2023-05-11 DIAGNOSIS — R00.2 PALPITATION: ICD-10-CM

## 2023-05-11 DIAGNOSIS — R06.83 SNORING: Primary | ICD-10-CM

## 2023-05-11 PROCEDURE — 99214 OFFICE O/P EST MOD 30 MIN: CPT | Performed by: FAMILY MEDICINE

## 2023-05-11 PROCEDURE — 3078F DIAST BP <80 MM HG: CPT | Performed by: FAMILY MEDICINE

## 2023-05-11 PROCEDURE — 3074F SYST BP LT 130 MM HG: CPT | Performed by: FAMILY MEDICINE

## 2023-11-21 ENCOUNTER — OFFICE VISIT (OUTPATIENT)
Dept: OBGYN CLINIC | Facility: CLINIC | Age: 33
End: 2023-11-21
Payer: COMMERCIAL

## 2023-11-21 VITALS
DIASTOLIC BLOOD PRESSURE: 79 MMHG | SYSTOLIC BLOOD PRESSURE: 111 MMHG | BODY MASS INDEX: 38.32 KG/M2 | HEART RATE: 97 BPM | WEIGHT: 230 LBS | HEIGHT: 65 IN

## 2023-11-21 DIAGNOSIS — Z01.419 WELL WOMAN EXAM: Primary | ICD-10-CM

## 2023-11-21 DIAGNOSIS — Z12.4 CERVICAL CANCER SCREENING: ICD-10-CM

## 2023-11-21 DIAGNOSIS — Z31.9 PATIENT DESIRES PREGNANCY: ICD-10-CM

## 2023-11-21 DIAGNOSIS — Z12.4 SCREENING FOR CERVICAL CANCER: ICD-10-CM

## 2023-11-21 PROCEDURE — 3078F DIAST BP <80 MM HG: CPT | Performed by: OBSTETRICS & GYNECOLOGY

## 2023-11-21 PROCEDURE — 3074F SYST BP LT 130 MM HG: CPT | Performed by: OBSTETRICS & GYNECOLOGY

## 2023-11-21 PROCEDURE — 3008F BODY MASS INDEX DOCD: CPT | Performed by: OBSTETRICS & GYNECOLOGY

## 2023-11-21 PROCEDURE — 99395 PREV VISIT EST AGE 18-39: CPT | Performed by: OBSTETRICS & GYNECOLOGY

## 2023-11-21 NOTE — PROGRESS NOTES
HPI:  The patient is a 28 yo F here for [employer]. Cycles monthly with 4 days flow. Wants conception. She and  having been TTC for 6 months without success. Taking PNVs.      Patient's last menstrual period was 10/30/2023 (exact date). Latest Ref Rng & Units 2018    10:30 AM   RECENT PAP RESULTS   Thinprep Pap Negative for intraepithelial lesion or malignancy, Negative for intraepithelial lesion or malignancy-Reactive/Other changes, Unsatisfactory for Evaluation Negative for intraepithelial lesion or malignancy         Hx Prior Abnormal Pap: No  Pap Date: 18  Pap Result Notes: NEG        Depression Screening (PHQ-2/PHQ-9): Over the LAST 2 WEEKS   Little interest or pleasure in doing things: Not at all    Feeling down, depressed, or hopeless: Not at all    PHQ-2 SCORE: 0          Reviewed medical and surgical history below       OBSTETRICS HISTORY:  OB History    Para Term  AB Living   1 1 1 0 0 1   SAB IAB Ectopic Multiple Live Births   0 0 0 0 1       GYNE HISTORY:  History   Sexual Activity    Sexual activity: Yes            MEDICAL HISTORY:  Past Medical History:   Diagnosis Date    Abdominal pain     Acute epiploic appendagitis    Anemia     Decorative tattoo     Disorder of thyroid     Graves disease 2011    Radioactive iodine tx.     Hypothyroidism        SURGICAL HISTORY:  Past Surgical History:   Procedure Laterality Date          OTHER Right 2022    Root Cannal       SOCIAL HISTORY:  Social History     Socioeconomic History    Marital status:      Spouse name: Not on file    Number of children: Not on file    Years of education: Not on file    Highest education level: Not on file   Occupational History    Not on file   Tobacco Use    Smoking status: Never    Smokeless tobacco: Never   Vaping Use    Vaping Use: Never used   Substance and Sexual Activity    Alcohol use: No     Alcohol/week: 0.0 standard drinks of alcohol    Drug use: No    Sexual activity: Yes   Other Topics Concern     Service Not Asked    Blood Transfusions Not Asked    Caffeine Concern No    Occupational Exposure Not Asked    Hobby Hazards Not Asked    Sleep Concern Not Asked    Stress Concern Not Asked    Weight Concern Not Asked    Special Diet Not Asked    Back Care Not Asked    Exercise Not Asked    Bike Helmet Not Asked    Seat Belt Not Asked    Self-Exams Not Asked   Social History Narrative    Not on file     Social Determinants of Health     Financial Resource Strain: Not on file   Food Insecurity: Not on file   Transportation Needs: Not on file   Physical Activity: Not on file   Stress: Not on file   Social Connections: Not on file   Housing Stability: Not on file       FAMILY HISTORY:  Family History   Problem Relation Age of Onset    Hypertension Mother     Hypertension Father     Prostate Cancer Father     Other (Other) Father         Hyperthyroidism    Other (Other) Sister         Graves disease    Diabetes Maternal Grandmother     Cancer Neg     Heart Disease Neg        MEDICATIONS:    Current Outpatient Medications:     levothyroxine 150 MCG Oral Tab, Take 1 tablet (150 mcg total) by mouth before breakfast., Disp: 90 tablet, Rfl: 3    ALLERGIES:  No Known Allergies      Review of Systems:  Constitutional:  Denies fevers and chills   Cardiovascular:  denies chest pain or palpitations  Respiratory:  denies shortness of breath  Gastrointestinal:  denies heartburn, abdominal pain, diarrhea or constipation  Genitourinary:  denies dysuria, incontinence, abnormal vaginal discharge, vaginal itching  Musculoskeletal:  denies back pain. Skin/Breast:  Denies any breast pain, lumps, or discharge. Neurological:  denies headaches, extremity weakness  Psychiatric: denies depression or anxiety.       Vitals:    11/21/23 1528   BP: 111/79   Pulse: 97       PHYSICAL EXAM:   Constitutional: well developed, well nourished  Head/Face: normocephalic  Neck/Thyroid: thyroid symmetric, no thyromegaly, no nodules, no adenopathy  Heart: Regular rate and rhythm   Lungs: clear to ascultation bilaterally   Lymphatic:no abnormal supraclavicular or axillary adenopathy is noted  Breast: normal without palpable masses, tenderness, asymmetry, nipple discharge, nipple retraction or skin changes  Abdomen:  soft, nontender, nondistended, no masses  Skin/Hair: no unusual rashes or bruises  Extremities: no edema, no cyanosis  Psychiatric: Appropriate mood and affect    Pelvic Exam:  External Genitalia: normal appearance, hair distribution, and no lesions  Urethral Meatus:  normal in size, location, without lesions and prolapse  Bladder:  No fullness, masses or tenderness  Vagina:  Normal appearance without lesions, no abnormal discharge  Cervix:  Normal without tenderness on motion  Uterus: normal in size, contour, position, mobility, without tenderness  Adnexa: normal without masses or tenderness  Perineum: normal    Assessment/Plan:  Matthew Canseco was seen today for gyn exam.    Diagnoses and all orders for this visit:    Well woman exam    Cervical cancer screening    Patient desires pregnancy        WWE:   Reviewed ASCCP guidelines with the patient -- Pap today  TTC: taking PNVs.  Use OPKs and cycle tracking with menstrual anni  Breast Health:     Mammo @ 38 yo  Encouraged monthly self breast exams with the patient   Discussed diet, exercise, MVIs with Vit D  Follow up in 1 yr for [facility]

## 2023-11-22 LAB — HPV I/H RISK 1 DNA SPEC QL NAA+PROBE: NEGATIVE

## 2023-11-24 LAB — LAST PAP RESULT: NORMAL

## 2024-05-07 ENCOUNTER — HOSPITAL ENCOUNTER (OUTPATIENT)
Age: 34
Discharge: HOME | End: 2024-05-07
Payer: COMMERCIAL

## 2024-05-07 ENCOUNTER — PATIENT MESSAGE (OUTPATIENT)
Dept: INTERNAL MEDICINE CLINIC | Facility: CLINIC | Age: 34
End: 2024-05-07

## 2024-05-07 ENCOUNTER — APPOINTMENT (OUTPATIENT)
Dept: GENERAL RADIOLOGY | Age: 34
End: 2024-05-07
Attending: Physician Assistant
Payer: COMMERCIAL

## 2024-05-07 ENCOUNTER — NURSE TRIAGE (OUTPATIENT)
Dept: INTERNAL MEDICINE CLINIC | Facility: CLINIC | Age: 34
End: 2024-05-07

## 2024-05-07 VITALS
TEMPERATURE: 98 F | OXYGEN SATURATION: 100 % | HEART RATE: 76 BPM | DIASTOLIC BLOOD PRESSURE: 82 MMHG | RESPIRATION RATE: 20 BRPM | SYSTOLIC BLOOD PRESSURE: 129 MMHG

## 2024-05-07 DIAGNOSIS — R00.2 PALPITATIONS: Primary | ICD-10-CM

## 2024-05-07 LAB
#MXD IC: 0.5 X10ˆ3/UL (ref 0.1–1)
ATRIAL RATE: 72 BPM
B-HCG UR QL: NEGATIVE
BUN BLD-MCNC: 16 MG/DL (ref 7–18)
CHLORIDE BLD-SCNC: 108 MMOL/L (ref 98–112)
CO2 BLD-SCNC: 23 MMOL/L (ref 21–32)
CREAT BLD-MCNC: 0.6 MG/DL
EGFRCR SERPLBLD CKD-EPI 2021: 122 ML/MIN/1.73M2 (ref 60–?)
GLUCOSE BLD-MCNC: 94 MG/DL (ref 70–99)
HCT VFR BLD AUTO: 40.6 %
HCT VFR BLD CALC: 44 %
HGB BLD-MCNC: 12.8 G/DL
ISTAT IONIZED CALCIUM FOR CHEM 8: 1.27 MMOL/L (ref 1.12–1.32)
LYMPHOCYTES # BLD AUTO: 2 X10ˆ3/UL (ref 1–4)
LYMPHOCYTES NFR BLD AUTO: 26.5 %
MCH RBC QN AUTO: 24.9 PG (ref 26–34)
MCHC RBC AUTO-ENTMCNC: 31.5 G/DL (ref 31–37)
MCV RBC AUTO: 79 FL (ref 80–100)
MIXED CELL %: 7.1 %
NEUTROPHILS # BLD AUTO: 5.1 X10ˆ3/UL (ref 1.5–7.7)
NEUTROPHILS NFR BLD AUTO: 66.4 %
P AXIS: 35 DEGREES
P-R INTERVAL: 142 MS
PLATELET # BLD AUTO: 247 X10ˆ3/UL (ref 150–450)
POTASSIUM BLD-SCNC: 4.6 MMOL/L (ref 3.6–5.1)
Q-T INTERVAL: 390 MS
QRS DURATION: 74 MS
QTC CALCULATION (BEZET): 427 MS
R AXIS: 52 DEGREES
RBC # BLD AUTO: 5.14 X10ˆ6/UL
SODIUM BLD-SCNC: 140 MMOL/L (ref 136–145)
T AXIS: 11 DEGREES
TROPONIN I BLD-MCNC: <0.02 NG/ML
VENTRICULAR RATE: 72 BPM
WBC # BLD AUTO: 7.6 X10ˆ3/UL (ref 4–11)

## 2024-05-07 PROCEDURE — 84484 ASSAY OF TROPONIN QUANT: CPT | Performed by: PHYSICIAN ASSISTANT

## 2024-05-07 PROCEDURE — 93000 ELECTROCARDIOGRAM COMPLETE: CPT | Performed by: PHYSICIAN ASSISTANT

## 2024-05-07 PROCEDURE — 81025 URINE PREGNANCY TEST: CPT | Performed by: PHYSICIAN ASSISTANT

## 2024-05-07 PROCEDURE — 85025 COMPLETE CBC W/AUTO DIFF WBC: CPT | Performed by: PHYSICIAN ASSISTANT

## 2024-05-07 PROCEDURE — 71046 X-RAY EXAM CHEST 2 VIEWS: CPT | Performed by: PHYSICIAN ASSISTANT

## 2024-05-07 PROCEDURE — 99214 OFFICE O/P EST MOD 30 MIN: CPT | Performed by: PHYSICIAN ASSISTANT

## 2024-05-07 PROCEDURE — 80047 BASIC METABLC PNL IONIZED CA: CPT | Performed by: PHYSICIAN ASSISTANT

## 2024-05-07 NOTE — DISCHARGE INSTRUCTIONS
Drink plenty of water and get plenty of rest   Avoid triggers/stressors   Follow up with your primary care provider

## 2024-05-07 NOTE — ED PROVIDER NOTES
Chief Complaint   Patient presents with    Dizziness       History obtained from: patient   services not used     HPI:     Shirin Cruz is a 33 year old female who presents with palpitations. Patient states she was sitting at work this morning when she developed blurry vision, dizziness, and palpitations. Patient states she \"felt like I was going to pass out.\" Patient states symptoms lasted approximately 4 minutes before spontaneously resolving. Patient states she had a similar episode yesterday and 5 days ago. Patient states she did not pass out during any of these episodes. Patient states all three episodes occurred while at work. Patient states symptoms have resolved and she is feeling somewhat nervous right now but otherwise feels normal. Patient states she has been evaluated for similar episode in the ER in the past. Patient takes levothyroxine for hypothyroidism, no other medications. Denies chest pain, shortness of breath, syncope, abdominal pain, vomiting, headache, numbness, weakness, fevers.     PMH  Past Medical History:    Abdominal pain    Acute epiploic appendagitis    Anemia    Decorative tattoo    Disorder of thyroid    Graves disease    Radioactive iodine tx.    Hypothyroidism       PFSH    PFSH asessment screens reviewed and agree.  Nurses notes reviewed I agree with documentation.    Family History   Problem Relation Age of Onset    Hypertension Mother     Hypertension Father     Prostate Cancer Father     Other (Other) Father         Hyperthyroidism    Other (Other) Sister         Graves disease    Diabetes Maternal Grandmother     Cancer Neg     Heart Disease Neg      Family history reviewed with patient/caregiver and is not pertinent to presenting problem.  Social History     Socioeconomic History    Marital status:      Spouse name: Not on file    Number of children: Not on file    Years of education: Not on file    Highest education level: Not on file   Occupational History     Not on file   Tobacco Use    Smoking status: Never    Smokeless tobacco: Never   Vaping Use    Vaping status: Never Used   Substance and Sexual Activity    Alcohol use: No     Alcohol/week: 0.0 standard drinks of alcohol    Drug use: No    Sexual activity: Yes   Other Topics Concern     Service Not Asked    Blood Transfusions Not Asked    Caffeine Concern No    Occupational Exposure Not Asked    Hobby Hazards Not Asked    Sleep Concern Not Asked    Stress Concern Not Asked    Weight Concern Not Asked    Special Diet Not Asked    Back Care Not Asked    Exercise Not Asked    Bike Helmet Not Asked    Seat Belt Not Asked    Self-Exams Not Asked   Social History Narrative    Not on file     Social Determinants of Health     Financial Resource Strain: Not on file   Food Insecurity: Not on file   Transportation Needs: Not on file   Physical Activity: Not on file   Stress: Not on file   Social Connections: Not on file   Housing Stability: Not on file         ROS:   Positive for stated complaint: palpitations, dizziness, blurry vision   All other systems reviewed and negative except as noted above.  Constitutional and Vital Signs Reviewed.    Physical Exam:     Findings:    /82   Pulse 76   Temp 98 °F (36.7 °C) (Temporal)   Resp 20   LMP 04/17/2024 (Exact Date)   SpO2 100%   GENERAL: well developed, no acute distress, non-toxic appearing   SKIN: good skin turgor, no obvious rashes  HEAD: normocephalic, atraumatic  EYES: sclera non-icteric bilaterally, conjunctiva clear bilaterally  OROPHARYNX: MMM, pharynx clear,  maintaining airway and secretions  NECK: supple, no lymphadenopathy, no masses, no nuchal rigidity, no trismus, no edema, phonation normal    CARDIO: RRR, normal heart sounds   LUNGS: clear to auscultation bilaterally, no increased WOB, no rales, rhonchi, or wheezes  GI: abdomen soft and non-tender   EXTREMITIES: no cyanosis or edema, FULLER without difficulty  NEURO: Cranial nerves II through  XII intact, finger-nose coordination intact, no palmar drift, BUE and BLE strength 5/5 and sensation intact to light touch, ambulating with steady gait  PSYCH: alert and oriented x3, answering questions appropriately, mood somewhat anxious    MDM/Assessment/Plan:   Orders for this encounter:    Orders Placed This Encounter    XR CHEST PA + LAT CHEST (CPT=71046)     dizziness, tingly feeling in legs, heart racing Pt states she has had 3 episodes of visual changes, then became   tachycardic and lightheaded. Pt states afterwards she developed a   headache. No LOC. Pt states symptoms resolved on own.  Pt denies symptoms   at this time.       Order Specific Question:   What is the Relevant Clinical Indication / Reason for Exam?     Answer:   palpitations     Order Specific Question:   Release to patient     Answer:   Immediate    POCT CBC     Order Specific Question:   Release to patient     Answer:   Immediate    EKG 12 Lead     Order Specific Question:   Release to patient     Answer:   Immediate    POCT ISTAT chem8 cartridge    ISTAT Troponin    POCT Pregnancy, Urine    iStat (Chem 8)    iStat (Troponin)    POCT Pregnancy, Urine       Labs performed this visit:  Recent Results (from the past 10 hour(s))   EKG 12 Lead    Collection Time: 05/07/24 10:34 AM   Result Value Ref Range    Ventricular rate 72 BPM    Atrial rate 72 BPM    P-R Interval 142 ms    QRS Duration 74 ms    Q-T Interval 390 ms    QTC Calculation (Bezet) 427 ms    P Axis 35 degrees    R Axis 52 degrees    T Axis 11 degrees   POCT CBC    Collection Time: 05/07/24 10:45 AM   Result Value Ref Range    WBC IC 7.6 4.0 - 11.0 x10ˆ3/uL    RBC IC 5.14 3.80 - 5.30 X10ˆ6/uL    HGB IC 12.8 12.0 - 16.0 g/dL    HCT IC 40.6 35.0 - 48.0 %    MCV IC 79.0 (L) 80.0 - 100.0 fL    MCH IC 24.9 (L) 26.0 - 34.0 pg    MCHC IC 31.5 31.0 - 37.0 g/dL    PLT .0 150.0 - 450.0 X10ˆ3/uL    # Neutrophil 5.1 1.5 - 7.7 X10ˆ3/uL    # Lymphocyte 2.0 1.0 - 4.0 X10ˆ3/uL    # Mixed  Cells 0.5 0.1 - 1.0 X10ˆ3/uL    Neutrophil % 66.4 %    Lymphocyte % 26.5 %    Mixed Cell % 7.1 %   POCT ISTAT chem8 cartridge    Collection Time: 05/07/24 10:54 AM   Result Value Ref Range    ISTAT Sodium 140 136 - 145 mmol/L    ISTAT BUN 16 7 - 18 mg/dL    ISTAT Potassium 4.6 3.6 - 5.1 mmol/L    ISTAT Chloride 108 98 - 112 mmol/L    ISTAT Ionized Calcium 1.27 1.12 - 1.32 mmol/L    ISTAT Hematocrit 44 34 - 50 %    ISTAT Glucose 94 70 - 99 mg/dL    ISTAT TCO2 23 21 - 32 mmol/L    ISTAT Creatinine 0.60 0.55 - 1.02 mg/dL    eGFR-Cr 122 >=60 mL/min/1.73m2   ISTAT Troponin    Collection Time: 05/07/24 10:57 AM   Result Value Ref Range    ISTAT Troponin <0.02 <0.045 ng/mL ng/mL   POCT Pregnancy, Urine    Collection Time: 05/07/24 11:16 AM   Result Value Ref Range    POCT Urine Pregnancy Negative Negative       Imaging performed this visit:  XR CHEST PA + LAT CHEST (CPT=71046)   Final Result   PROCEDURE: XR CHEST PA + LAT CHEST (CPT=71046)       COMPARISON: [facility], XR CHEST AP PORTABLE (CPT=71045),    5/01/2023, 6:05 PM.  [facility], X CHEST PA LAT ROUTINE,    9/29/2010, 4:01 PM.       INDICATIONS: Episodes of palpatations, dizziness and lightheadness for 6    ays.       TECHNIQUE:   Two views.         FINDINGS:    CARDIAC/VASC: No cardiac silhouette abnormality or cardiomegaly.     Unremarkable pulmonary vasculature.     MEDIAST/ANTONETTE: No visible mass or adenopathy.    LUNGS/PLEURA: No significant pulmonary parenchymal abnormalities.  No    effusion or pleural thickening.     BONES: Slight scoliosis thoracic spine.   OTHER: Negative.                     =====   CONCLUSION: No acute cardiopulmonary disease.               Dictated by (CST): Sebastian Xie MD on 5/07/2024 at 11:38 AM        Finalized by (CST): Sebastian Xie MD on 5/07/2024 at 11:39 AM                   Medical Decision Making  DDx includes anxiety versus panic disorder versus arrhythmia versus electrolyte derangement  versus dehydration versus anemia versus ACS versus other.  Patient is overall well-appearing, somewhat anxious, with stable vitals and tolerating oral intake.  No hypoxia or signs of respiratory distress.  No chest pain or shortness of breath.  EKG reviewed, normal sinus rhythm, rate 72, no ST elevation or ischemic changes.  CBC reviewed, grossly unremarkable without evidence of infection or anemia.  BMP reviewed, grossly unremarkable without evidence of electrolyte derangements or kidney dysfunction.  Troponin within normal limits.  Chest x-ray reviewed, no evidence of acute cardiopulmonary process.    On reassessment, patient is resting comfortably and remains well-appearing.  No new or worsening symptoms throughout IC stay.  Discussed results with patient.  Discussed possible etiologies of symptoms with patient.  Given that patient's symptoms have resolved and grossly unremarkable workup as per above, will discharge patient to home with prompt follow-up.  Discussed supportive care including rest, increased fluid intake, OTC Tylenol/Motrin as needed for pain, and avoidance of stressors/anxiety triggers.  Instructed patient to go directly to nearest ER with any worsening or concerning symptoms.  Follow-up with PCP.  Work note provided.    Discussed case with supervising attending Dr. Kiran who is in agreement with assessment and plan.    Amount and/or Complexity of Data Reviewed  External Data Reviewed: labs, radiology, ECG and notes.  Labs: ordered.  Radiology: ordered and independent interpretation performed.  ECG/medicine tests: ordered and independent interpretation performed.    Risk  OTC drugs.          Diagnosis:    ICD-10-CM    1. Palpitations  R00.2 XR CHEST PA + LAT CHEST (CPT=71046)     XR CHEST PA + LAT CHEST (CPT=71046)          All results reviewed and discussed with patient/patient's family. Patient/patient's family verbalize excellent understanding of instructions and feels comfortable with plan.  All of patient's/patient's family's questions were addressed.   See AVS for detailed discharge instructions for your condition today.    Follow Up with:  Deisy Farah MD  [address]  [place] [address]  [phone]    Schedule an appointment as soon as possible for a visit         Note: This document was dictated using Dragon medical dictation software.  Proofreading was performed to the best of my ability, but errors may be present.    Neha Cornell PA-C

## 2024-05-07 NOTE — ED INITIAL ASSESSMENT (HPI)
Pt states she has had 3 episodes of visual changes, then became tachycardic and lightheaded. Pt states afterwards she developed a headache. No LOC. Pt states symptoms resolved on own.  Pt denies symptoms at this time.

## 2024-05-07 NOTE — TELEPHONE ENCOUNTER
From: Shirin Cruz  To: Deisy Barrientos  Sent: 5/7/2024 9:00 AM CDT  Subject: Fainting & dizziness    Good Morning Dr. Ryan,    I was wondering if there was any way I can make an appointment to see you sooner or if you can request blood work. - I almost fainted yesterday at work and it also happened last Thursday. I think I get up too fast? But yesterday when I sat down I just felt a rush in my legs and my heart was beating so fast and hard. The rest of the day I had a headache. I have an appointment with you all the way until September for my check up.     Thanks,

## 2024-05-07 NOTE — TELEPHONE ENCOUNTER
Spoke with Ngozi who states she just been evaluated at the [facility] and instructed to follow-up with her PCP for evaluation of her thyroid. Patient denied new or worsening symptoms so no triage required.     RN informed the patient if have syncopal episode to proceed to the emergency department. Patient voiced understanding.       The patient was scheduled on 5/9/24 at 8:20 AM with Dr. Ryan.

## 2024-05-09 ENCOUNTER — OFFICE VISIT (OUTPATIENT)
Dept: INTERNAL MEDICINE CLINIC | Facility: CLINIC | Age: 34
End: 2024-05-09
Payer: COMMERCIAL

## 2024-05-09 ENCOUNTER — LAB ENCOUNTER (OUTPATIENT)
Dept: LAB | Facility: HOSPITAL | Age: 34
End: 2024-05-09
Attending: FAMILY MEDICINE
Payer: COMMERCIAL

## 2024-05-09 VITALS
DIASTOLIC BLOOD PRESSURE: 70 MMHG | BODY MASS INDEX: 37.65 KG/M2 | WEIGHT: 226 LBS | HEART RATE: 92 BPM | OXYGEN SATURATION: 97 % | HEIGHT: 65 IN | SYSTOLIC BLOOD PRESSURE: 100 MMHG | TEMPERATURE: 98 F

## 2024-05-09 DIAGNOSIS — R00.2 PALPITATIONS: ICD-10-CM

## 2024-05-09 DIAGNOSIS — Z00.00 HEALTHCARE MAINTENANCE: ICD-10-CM

## 2024-05-09 DIAGNOSIS — E89.0 HYPOTHYROIDISM, POSTABLATIVE: ICD-10-CM

## 2024-05-09 DIAGNOSIS — R55 PRE-SYNCOPE: ICD-10-CM

## 2024-05-09 DIAGNOSIS — G43.009 ATYPICAL MIGRAINE: ICD-10-CM

## 2024-05-09 DIAGNOSIS — R55 PRE-SYNCOPE: Primary | ICD-10-CM

## 2024-05-09 DIAGNOSIS — E55.9 VITAMIN D DEFICIENCY: ICD-10-CM

## 2024-05-09 LAB
ALBUMIN SERPL-MCNC: 4.6 G/DL (ref 3.2–4.8)
ALBUMIN/GLOB SERPL: 1.4 {RATIO} (ref 1–2)
ALP LIVER SERPL-CCNC: 59 U/L
ALT SERPL-CCNC: 25 U/L
ANION GAP SERPL CALC-SCNC: 7 MMOL/L (ref 0–18)
AST SERPL-CCNC: 19 U/L (ref ?–34)
BILIRUB SERPL-MCNC: 0.7 MG/DL (ref 0.3–1.2)
BUN BLD-MCNC: 14 MG/DL (ref 9–23)
BUN/CREAT SERPL: 20.6 (ref 10–20)
CALCIUM BLD-MCNC: 9.4 MG/DL (ref 8.7–10.4)
CHLORIDE SERPL-SCNC: 110 MMOL/L (ref 98–112)
CHOLEST SERPL-MCNC: 159 MG/DL (ref ?–200)
CO2 SERPL-SCNC: 25 MMOL/L (ref 21–32)
CREAT BLD-MCNC: 0.68 MG/DL
DEPRECATED HBV CORE AB SER IA-ACNC: 12.6 NG/ML
EGFRCR SERPLBLD CKD-EPI 2021: 119 ML/MIN/1.73M2 (ref 60–?)
EST. AVERAGE GLUCOSE BLD GHB EST-MCNC: 108 MG/DL (ref 68–126)
FASTING PATIENT LIPID ANSWER: YES
FASTING STATUS PATIENT QL REPORTED: YES
GLOBULIN PLAS-MCNC: 3.3 G/DL (ref 2–3.5)
GLUCOSE BLD-MCNC: 90 MG/DL (ref 70–99)
HBA1C MFR BLD: 5.4 % (ref ?–5.7)
HDLC SERPL-MCNC: 52 MG/DL (ref 40–59)
IRON SATN MFR SERPL: 14 %
IRON SERPL-MCNC: 51 UG/DL
LDLC SERPL CALC-MCNC: 86 MG/DL (ref ?–100)
NONHDLC SERPL-MCNC: 107 MG/DL (ref ?–130)
OSMOLALITY SERPL CALC.SUM OF ELEC: 294 MOSM/KG (ref 275–295)
POTASSIUM SERPL-SCNC: 4.5 MMOL/L (ref 3.5–5.1)
PROT SERPL-MCNC: 7.9 G/DL (ref 5.7–8.2)
SODIUM SERPL-SCNC: 142 MMOL/L (ref 136–145)
T3FREE SERPL-MCNC: 2.99 PG/ML (ref 2.4–4.2)
T4 FREE SERPL-MCNC: 1.6 NG/DL (ref 0.8–1.7)
TIBC SERPL-MCNC: 371 UG/DL (ref 250–425)
TRANSFERRIN SERPL-MCNC: 249 MG/DL (ref 250–380)
TRIGL SERPL-MCNC: 117 MG/DL (ref 30–149)
TSI SER-ACNC: 0.11 MIU/ML (ref 0.55–4.78)
VIT D+METAB SERPL-MCNC: 19.2 NG/ML (ref 30–100)
VLDLC SERPL CALC-MCNC: 19 MG/DL (ref 0–30)

## 2024-05-09 PROCEDURE — 84443 ASSAY THYROID STIM HORMONE: CPT

## 2024-05-09 PROCEDURE — 84481 FREE ASSAY (FT-3): CPT

## 2024-05-09 PROCEDURE — 83540 ASSAY OF IRON: CPT

## 2024-05-09 PROCEDURE — 84439 ASSAY OF FREE THYROXINE: CPT

## 2024-05-09 PROCEDURE — 36415 COLL VENOUS BLD VENIPUNCTURE: CPT

## 2024-05-09 PROCEDURE — 84466 ASSAY OF TRANSFERRIN: CPT

## 2024-05-09 PROCEDURE — 82306 VITAMIN D 25 HYDROXY: CPT

## 2024-05-09 PROCEDURE — 80061 LIPID PANEL: CPT

## 2024-05-09 PROCEDURE — 80053 COMPREHEN METABOLIC PANEL: CPT

## 2024-05-09 PROCEDURE — 83036 HEMOGLOBIN GLYCOSYLATED A1C: CPT

## 2024-05-09 PROCEDURE — 82728 ASSAY OF FERRITIN: CPT

## 2024-05-09 RX ORDER — SUMATRIPTAN 25 MG/1
25 TABLET, FILM COATED ORAL EVERY 2 HOUR PRN
Qty: 15 TABLET | Refills: 0 | Status: SHIPPED | OUTPATIENT
Start: 2024-05-09

## 2024-05-13 ENCOUNTER — HOSPITAL ENCOUNTER (OUTPATIENT)
Dept: CV DIAGNOSTICS | Facility: HOSPITAL | Age: 34
Discharge: HOME | End: 2024-05-13
Attending: FAMILY MEDICINE

## 2024-05-13 DIAGNOSIS — R55 PRE-SYNCOPE: ICD-10-CM

## 2024-05-13 DIAGNOSIS — R00.2 PALPITATIONS: ICD-10-CM

## 2024-05-13 PROCEDURE — 93246 EXT ECG>7D<15D RECORDING: CPT | Performed by: FAMILY MEDICINE

## 2024-05-13 PROCEDURE — 93247 EXT ECG>7D<15D SCAN A/R: CPT | Performed by: FAMILY MEDICINE

## 2024-05-30 ENCOUNTER — TELEPHONE (OUTPATIENT)
Dept: INTERNAL MEDICINE CLINIC | Facility: CLINIC | Age: 34
End: 2024-05-30

## 2024-05-30 NOTE — TELEPHONE ENCOUNTER
Fax notice received from [facility] [place] -- approval of the ordered cardiac US- doppler flow study if perforned 5/27/24-7/28/24.    Pt has test scheduled 6/1/24 per [facility].    Routed to Dr Ryan.

## 2024-06-01 ENCOUNTER — HOSPITAL ENCOUNTER (OUTPATIENT)
Dept: CV DIAGNOSTICS | Facility: HOSPITAL | Age: 34
Discharge: HOME | End: 2024-06-01
Attending: FAMILY MEDICINE
Payer: COMMERCIAL

## 2024-06-01 DIAGNOSIS — R00.2 PALPITATIONS: ICD-10-CM

## 2024-06-01 DIAGNOSIS — R55 PRE-SYNCOPE: ICD-10-CM

## 2024-06-01 PROCEDURE — 93306 TTE W/DOPPLER COMPLETE: CPT | Performed by: FAMILY MEDICINE

## 2024-06-10 ENCOUNTER — PATIENT MESSAGE (OUTPATIENT)
Dept: INTERNAL MEDICINE CLINIC | Facility: CLINIC | Age: 34
End: 2024-06-10

## 2024-06-10 DIAGNOSIS — R00.2 PALPITATIONS: ICD-10-CM

## 2024-06-10 DIAGNOSIS — R55 PRE-SYNCOPE: ICD-10-CM

## 2024-06-10 DIAGNOSIS — R00.0 TACHYCARDIA: Primary | ICD-10-CM

## 2024-06-11 NOTE — TELEPHONE ENCOUNTER
Celia Flores 6/11/2024 8:38 AM CDT      ----- Message -----  From: Shirin Cruz  Sent: 6/10/2024 1:52 PM CDT  To: Trey Fregoso Clinical Staff  Subject: Test Results     Gracy Ryan,     I saw my results came back normal for the echo as well. I still get dizziness and heart palpatations almost daily. What do you reccommend?

## 2024-07-03 ENCOUNTER — TELEPHONE (OUTPATIENT)
Dept: INTERNAL MEDICINE CLINIC | Facility: CLINIC | Age: 34
End: 2024-07-03

## 2024-07-03 DIAGNOSIS — R42 DIZZINESS: Primary | ICD-10-CM

## 2024-07-03 NOTE — TELEPHONE ENCOUNTER
Spoke to Shirin had another near syncopal episode where she almost had loss of consciousness today with visual going out for approximately 1 minute. During these episodes her heart is pounding rapidly. Patient has been having these episodes since May 2024. Patient was evaluated in the ED on 7/1/24 and diagnosed with anxiety attack but the patient does not believe this is accurate. Patient's associated symptoms are intermittent dizziness on a daily basis with these episodes.    Patient evaluated by Cardiologist at [facility] today who ordered tilt table test but no soon appointments. Patient scheduled for 8/20/24.     RN advised not to drive herself since having these episodes.        Please advise.

## 2024-07-03 NOTE — TELEPHONE ENCOUNTER
Pt called requesting a order for CT scan for her dizziness  Pt stated that she almost passed out on Monday and today and had to go to ER but was told she was fine  Pt stated that the ct scan was mentioned on last appt with pcp but pt decided to wait. Pt now wants to get it done    Please advise

## 2024-07-03 NOTE — TELEPHONE ENCOUNTER
Consulted with Dr. Vann who advised if the patient has worsening symptoms to have someone take her to the ED otherwise, keep well hydrated, slow position changes to prevent episodes and Dr. Ryan will further advise when in the office.

## 2024-07-03 NOTE — TELEPHONE ENCOUNTER
Spoke with Shirin who stated she feels OK right now but stated the symptoms are intermittent. RN informed Shirin Vann advised to go to the ED if she feels her symptoms are worsening. RN also informed her a message was sent to Dr. Ryan with her symptoms who will be back on 7/8/24. Patient does not want to go to the ED now but agreed to go to the ED if has another episode and will have someone drive her.

## 2024-07-03 NOTE — TELEPHONE ENCOUNTER
Discussed with RN.  Recommend change positions slowly, stay hydrated, deep breathing.  If faints or feeling very ill, recommend return to ER.    Would defer imaging orders to pcp when she returns.

## 2024-07-07 NOTE — TELEPHONE ENCOUNTER
I'm ok with ordering the CT of the brain w/o a visit since we had discussed next steps at last visit. However, for insurance purposes to get it approved, would I need to see her again? We can try also placing the order and if gets denied then we see her in the office. Let me know and again, OK to place CT brain order

## 2024-07-08 NOTE — TELEPHONE ENCOUNTER
Spoke to Lolita in central scheduling inquire inquiry correct exam for CT brain without contrast? She stated CT brain or head is correct exam.     CT brain or head entered.

## 2024-07-08 NOTE — TELEPHONE ENCOUNTER
Spoke with Shirin Ryan  ordered CT brain and advised to call central scheduling for an appointment. Patient voiced understanding.

## 2024-07-09 ENCOUNTER — TELEPHONE (OUTPATIENT)
Dept: CASE MANAGEMENT | Age: 34
End: 2024-07-09

## 2024-07-09 DIAGNOSIS — R42 DIZZINESS: Primary | ICD-10-CM

## 2024-07-09 DIAGNOSIS — F41.0 ANXIETY ATTACK: ICD-10-CM

## 2024-07-09 NOTE — TELEPHONE ENCOUNTER
CT         Status: DENIED        Reference number [id number]     A copy of the denial letter is filed under the MEDIA tab, reference for complete details. You may reach out to Shavon at [phone] to discuss decision.     Please reach out to patient with plan of care.      Thank you

## 2024-07-09 NOTE — TELEPHONE ENCOUNTER
I can't find the denial letter on media to see the reason behind the denial. DO you have that info? Not sure if we then need to do an OV or if we need to send to neuro

## 2024-07-10 NOTE — TELEPHONE ENCOUNTER
We are going to have to send to neuro. I do not think we need to do a hearing exam.  Ok to place neuro referral for pt.

## 2024-07-10 NOTE — TELEPHONE ENCOUNTER
Neuro referral placed for patient and MyChart message sent to patient explaining CT Denial and need for Neuro consult.  PH# provided to allow for appt scheduling.

## 2024-07-10 NOTE — TELEPHONE ENCOUNTER
Head/Brain - CT   Without Contrast          Review Exam Withdraw Exam  Clinical Rationale:    Your doctor told us that you have dizziness. Your doctor ordered a CT scan of your head. A CT is a way to take pictures of the inside of your body. This test should be used when you have had an abnormal hearing test. We reviewed the notes we have. The notes do not show that you had a hearing test. Based on the information we have, this test is not medically necessary. We used [facility] Medical Benefits Management Clinical Guideline titled Imaging of the Brain to make this decision. You may view this guideline at [email or web address].com/mb-guidelines-radiology.

## 2024-07-15 RX ORDER — LORAZEPAM 0.5 MG/1
0.5 TABLET ORAL 2 TIMES DAILY PRN
Qty: 15 TABLET | Refills: 0 | Status: SHIPPED | OUTPATIENT
Start: 2024-07-15

## 2024-07-15 NOTE — TELEPHONE ENCOUNTER
I refilled it however at a lower dose. Please caution to only use for emergency PRN as this can become addictive.

## 2024-07-27 ENCOUNTER — LAB ENCOUNTER (OUTPATIENT)
Dept: LAB | Facility: HOSPITAL | Age: 34
End: 2024-07-27
Attending: FAMILY MEDICINE
Payer: COMMERCIAL

## 2024-07-27 DIAGNOSIS — E89.0 HYPOTHYROIDISM, POSTABLATIVE: ICD-10-CM

## 2024-07-27 LAB
T3FREE SERPL-MCNC: 3.15 PG/ML (ref 2.4–4.2)
T4 FREE SERPL-MCNC: 1.6 NG/DL (ref 0.8–1.7)
TSI SER-ACNC: 0.07 MIU/ML (ref 0.55–4.78)

## 2024-07-27 PROCEDURE — 84439 ASSAY OF FREE THYROXINE: CPT | Performed by: FAMILY MEDICINE

## 2024-07-27 PROCEDURE — 84481 FREE ASSAY (FT-3): CPT | Performed by: FAMILY MEDICINE

## 2024-07-27 PROCEDURE — 84443 ASSAY THYROID STIM HORMONE: CPT | Performed by: FAMILY MEDICINE

## 2024-07-27 RX ORDER — LEVOTHYROXINE SODIUM 112 UG/1
112 TABLET ORAL
Qty: 90 TABLET | Refills: 0 | Status: SHIPPED | OUTPATIENT
Start: 2024-07-27

## 2024-07-31 DIAGNOSIS — F41.0 ANXIETY ATTACK: ICD-10-CM

## 2024-08-01 RX ORDER — LORAZEPAM 0.5 MG/1
0.5 TABLET ORAL DAILY PRN
Qty: 15 TABLET | Refills: 0 | Status: SHIPPED | OUTPATIENT
Start: 2024-08-01

## 2024-08-20 ENCOUNTER — HOSPITAL ENCOUNTER (OUTPATIENT)
Dept: INTERVENTIONAL RADIOLOGY/VASCULAR | Facility: HOSPITAL | Age: 34
Discharge: HOME | End: 2024-08-20
Attending: INTERNAL MEDICINE | Admitting: INTERNAL MEDICINE
Payer: COMMERCIAL

## 2024-08-20 VITALS
BODY MASS INDEX: 37.49 KG/M2 | RESPIRATION RATE: 14 BRPM | HEART RATE: 71 BPM | HEIGHT: 65 IN | SYSTOLIC BLOOD PRESSURE: 110 MMHG | DIASTOLIC BLOOD PRESSURE: 76 MMHG | WEIGHT: 225 LBS | OXYGEN SATURATION: 97 %

## 2024-08-20 DIAGNOSIS — R42 DIZZINESS: ICD-10-CM

## 2024-08-20 DIAGNOSIS — R00.0 TACHYCARDIA: ICD-10-CM

## 2024-08-20 LAB — B-HCG UR QL: NEGATIVE

## 2024-08-20 PROCEDURE — 81025 URINE PREGNANCY TEST: CPT

## 2024-08-20 PROCEDURE — 4A03XB1 MEASUREMENT OF ARTERIAL PRESSURE, PERIPHERAL, EXTERNAL APPROACH: ICD-10-PCS | Performed by: INTERNAL MEDICINE

## 2024-08-20 PROCEDURE — 93660 TILT TABLE EVALUATION: CPT | Performed by: INTERNAL MEDICINE

## 2024-08-20 PROCEDURE — 4A02XFZ MEASUREMENT OF CARDIAC RHYTHM, EXTERNAL APPROACH: ICD-10-PCS | Performed by: INTERNAL MEDICINE

## 2024-08-20 PROCEDURE — 36415 COLL VENOUS BLD VENIPUNCTURE: CPT

## 2024-08-20 RX ORDER — NITROGLYCERIN 0.4 MG/1
TABLET SUBLINGUAL
Status: COMPLETED
Start: 2024-08-20 | End: 2024-08-20

## 2024-08-20 RX ORDER — NITROGLYCERIN 0.4 MG/1
0.4 TABLET SUBLINGUAL ONCE
Status: COMPLETED | OUTPATIENT
Start: 2024-08-20 | End: 2024-08-20

## 2024-08-20 RX ADMIN — NITROGLYCERIN 0.4 MG: 0.4 TABLET SUBLINGUAL at 08:45:00

## 2024-08-20 NOTE — PROCEDURES
CARDIOLOGY DEPARTMENT REPORT TILT TABLE STUDY DATE:   : Sid Griffiths MD   INDICATION: Syncope.     BRIEF HISTORY: The patient is a 33 year old female  referred for a tilt table study.She  has a previous history of pre-syncope. Previous cardiac work up has been unremarkable.     METHODS: The patient was brought to the outpatient cardiac telemetry unit in a fasting and nonsedated state. Baseline vital signs were monitored with continuous ECG, pulse oximeter, and noninvasive hemodynamic monitoring. After approximately 20 minutes, the patient was placed in the upright position at approximately 80 degrees. After about 20 minutes, the patient was then given a single sublingual nitroglycerin tablet. Within a few minutes, the patient experienced sinus tachycardia but no hypotension. There were mild sob/cp and heavy head feeling but no presyncope.     The patient was reclined to the supine position with immediate recovery or heart rate.     FINDINGS:   1. Baseline heart rate was between 70 and 80 beats per minute. Blood pressure was between 107 and 114 mmHg systolic.   2. The patient was then tilted upright. There were no significant changes in blood pressures as it remained between 103 and 111 mmHg systolic. Heart rate remained stable between 85 and 92 beats per minute.   3. The patient was given a single sublingual nitroglycerin tablet. The heart rate gradually increased to a peak level of 150 beats per minute. The blood pressure gradually decreased down to 102 mmHg systolic. The patient had some chest pain and shortness of breath with mild lightheadedness but no presyncope.     CONCLUSIONS:   1. This is a Negative tilt table study for autonomic dysfunciton/POTS/vasovagal syncope at baseline or following the administration of sublingual nitroglycerin.   2. The patient has been advised see neurology      Sid Griffiths MD  [facility]  Cardiac Electrophysiology  8/20/2024

## 2024-08-20 NOTE — H&P
History & Physical Examination    Patient Name: Shirin Cruz    Diagnosis: presyncope, dizziness, possible vertig    Present Illness: Patient has intermittent presyncope and dizziness with no complete loss of consciousness.  Workup has been unremarkable to date and presents for tilt table test for evaluation regarding autonomic dysfunction.  The patient of note in the interim has been taking Valium which has helped with the symptoms and then stopped it and symptoms returned.  Symptoms are many times feeling lightheaded and almost a spinning type sensation.  Patient has no loss of consciousness.  No specific palpitations chest pain or shortness of breath.    Allergies: No Known Allergies    Past Medical History:    Abdominal pain    Acute epiploic appendagitis    Anemia    Decorative tattoo    Disorder of thyroid    Graves disease    Radioactive iodine tx.    Hypothyroidism     Past Surgical History:   Procedure Laterality Date          Other Right 2022    Root Cannal     Family History   Problem Relation Age of Onset    Hypertension Mother     Hypertension Father     Prostate Cancer Father     Other (Other) Father         Hyperthyroidism    Other (Other) Sister         Graves disease    Diabetes Maternal Grandmother     Cancer Neg     Heart Disease Neg          SYSTEM Check if Review is Normal Check if Physical Exam is Normal If not normal, please explain:   HEENT [x ] [x ]    NEURO [x ] [x ]    HEART [x ] [x ]    LUNGS [x ] [x ]    ABDOMEN [x ] [x ]    EXTREMITIES [x ] [x ]    OTHER        Plan: brittaine Griffiths MD  [facility]  Cardiac Electrophysiology  2024

## 2024-08-20 NOTE — IVS NOTE
DISCHARGE NOTE      Pt is able to sit up and ambulate without difficulty.   IV access removed  Instruction provided, patient/family verbalizes understanding.   Dr. Griffiths    spoke with patient/family post procedure.      Pt discharge via wheelchair to [facility]      Follow up Appointment: 9/9 kalyan 3pm     New Prescription: n/a

## 2024-09-21 ENCOUNTER — PATIENT MESSAGE (OUTPATIENT)
Dept: INTERNAL MEDICINE CLINIC | Facility: CLINIC | Age: 34
End: 2024-09-21

## 2024-09-21 DIAGNOSIS — Z32.00 POSSIBLE PREGNANCY, NOT YET CONFIRMED: Primary | ICD-10-CM

## 2024-09-24 NOTE — TELEPHONE ENCOUNTER
From: Shirin Cruz  To: Deisy Barrientos  Sent: 9/21/2024 9:32 AM CDT  Subject: Pregnant     Hi Dr. Ryan!    I got 2 positive pregnancy tests at home. Can you please order bloodwork to have it confirmed? I don’t remember for my first baby what the next steps are, do I make a OBGYN appointment after it’s confirmed?    Thanks!

## 2024-09-25 ENCOUNTER — LAB ENCOUNTER (OUTPATIENT)
Dept: LAB | Facility: HOSPITAL | Age: 34
End: 2024-09-25
Attending: FAMILY MEDICINE
Payer: COMMERCIAL

## 2024-09-25 DIAGNOSIS — Z32.00 POSSIBLE PREGNANCY, NOT YET CONFIRMED: ICD-10-CM

## 2024-09-25 LAB — B-HCG SERPL-ACNC: 4903.5 MIU/ML

## 2024-09-25 PROCEDURE — 84702 CHORIONIC GONADOTROPIN TEST: CPT | Performed by: FAMILY MEDICINE

## 2024-10-08 NOTE — TELEPHONE ENCOUNTER
[facility] faxed request for documentation PAST SURGICAL HISTORY:  History of appendectomy     History of bilateral salpingo-oophorectomy (BSO)     History of hysterectomy

## 2024-10-25 ENCOUNTER — TELEPHONE (OUTPATIENT)
Dept: OBGYN CLINIC | Facility: CLINIC | Age: 34
End: 2024-10-25

## 2024-10-25 NOTE — TELEPHONE ENCOUNTER
Nickloas call patient to help appropriately schedule prenatal care.  She made mychart appointment with me via physical option.  Needs RN OB and then schedule appropriately

## 2024-10-26 NOTE — TELEPHONE ENCOUNTER
Patient advised we will need to reschedule her appointment.  Appointment with Dr. Torres canceled.  Last menstrual period 7/18, periods every 28-30 days (14w2d).  Patient delivered with us in the past.  OB Nurse Education scheduled on 10/28.  New Prenatal scheduled on 11/2.  Patient states she will be able to get any labs done prior to 10/31.  Patient taking a pnv.  Patient to call with questions or concerns.    Ht: 5'5\"  Wt: 226

## 2024-10-28 ENCOUNTER — LAB ENCOUNTER (OUTPATIENT)
Dept: LAB | Facility: HOSPITAL | Age: 34
End: 2024-10-28
Attending: OBSTETRICS & GYNECOLOGY
Payer: COMMERCIAL

## 2024-10-28 ENCOUNTER — NURSE ONLY (OUTPATIENT)
Dept: OBGYN CLINIC | Facility: CLINIC | Age: 34
End: 2024-10-28
Payer: COMMERCIAL

## 2024-10-28 DIAGNOSIS — Z34.81 ENCOUNTER FOR SUPERVISION OF OTHER NORMAL PREGNANCY IN FIRST TRIMESTER (HCC): Primary | ICD-10-CM

## 2024-10-28 DIAGNOSIS — E89.0 HYPOTHYROIDISM, POSTABLATIVE: ICD-10-CM

## 2024-10-28 DIAGNOSIS — Z34.81 ENCOUNTER FOR SUPERVISION OF OTHER NORMAL PREGNANCY IN FIRST TRIMESTER (HCC): ICD-10-CM

## 2024-10-28 LAB
ANTIBODY SCREEN: NEGATIVE
BASOPHILS # BLD AUTO: 0.04 X10(3) UL (ref 0–0.2)
BASOPHILS NFR BLD AUTO: 0.4 %
DEPRECATED RDW RBC AUTO: 44.9 FL (ref 35.1–46.3)
EOSINOPHIL # BLD AUTO: 0.14 X10(3) UL (ref 0–0.7)
EOSINOPHIL NFR BLD AUTO: 1.5 %
ERYTHROCYTE [DISTWIDTH] IN BLOOD BY AUTOMATED COUNT: 15.2 % (ref 11–15)
GLUCOSE 1H P GLC SERPL-MCNC: 144 MG/DL
HBV SURFACE AG SER-ACNC: <0.1 [IU]/L
HBV SURFACE AG SERPL QL IA: NONREACTIVE
HCG SERPL QL: POSITIVE
HCT VFR BLD AUTO: 40.5 %
HCV AB SERPL QL IA: NONREACTIVE
HGB BLD-MCNC: 13.2 G/DL
IMM GRANULOCYTES # BLD AUTO: 0.04 X10(3) UL (ref 0–1)
IMM GRANULOCYTES NFR BLD: 0.4 %
LYMPHOCYTES # BLD AUTO: 2.3 X10(3) UL (ref 1–4)
LYMPHOCYTES NFR BLD AUTO: 24 %
MCH RBC QN AUTO: 26.6 PG (ref 26–34)
MCHC RBC AUTO-ENTMCNC: 32.6 G/DL (ref 31–37)
MCV RBC AUTO: 81.5 FL
MONOCYTES # BLD AUTO: 0.67 X10(3) UL (ref 0.1–1)
MONOCYTES NFR BLD AUTO: 7 %
NEUTROPHILS # BLD AUTO: 6.4 X10 (3) UL (ref 1.5–7.7)
NEUTROPHILS # BLD AUTO: 6.4 X10(3) UL (ref 1.5–7.7)
NEUTROPHILS NFR BLD AUTO: 66.7 %
PLATELET # BLD AUTO: 265 10(3)UL (ref 150–450)
RBC # BLD AUTO: 4.97 X10(6)UL
RH BLOOD TYPE: POSITIVE
RUBV IGG SER QL: POSITIVE
RUBV IGG SER-ACNC: 90 IU/ML (ref 10–?)
T PALLIDUM AB SER QL IA: NONREACTIVE
TSI SER-ACNC: 2.76 MIU/ML (ref 0.55–4.78)
WBC # BLD AUTO: 9.6 X10(3) UL (ref 4–11)

## 2024-10-28 PROCEDURE — 84443 ASSAY THYROID STIM HORMONE: CPT | Performed by: FAMILY MEDICINE

## 2024-10-28 NOTE — PROGRESS NOTES
Pt seen for OBN appt today with no complaints. Normal PN labs ordered plus 1 hr gtt, varicella, TSH and HCG qual. Order placed for [facility] navigator with diagnosis: antepartum anxiety. Pt advised all labs must be completed and resulted prior to NPN appt. If labs are not completed and resulted the NPN appt will be cancelled. Pt informed again of both male and female providers and the need to rotate PN appt with all providers since OB on-call will be the one that delivers her. Assisted pt with scheduling NPN appt with MD.       Height: 5'5''  Weight: 225 lbs  BMI: 37.4    Partner's name is Dez contact #[phone]; race:   Occupation: Reconstruction coordinator    MEDICAL HISTORY    Anemia Yes In the past   Anesthetic complications No    Anxiety/Depression  Yes Anxiety undiagnosed. Patient had previously been taking alprazolam for 1-2 months, but has since stopped. Patient with hx ER visit for anxiety attack.   Autoimmune Disorder No    Asthma  No    Cancer No    Diabetes  No    Gyne/breast Surgery Yes C/s 7/16/2020, hx IUD insertion and removal   Heart Disease No    Hepatitis/Liver Disease  No    History of blood transfusion No    History of abnormal pap No    Hypertension  No    Infertility  No    Kidney Disease/Frequent UTIs  No    Medication Allergies No    Latex Allergies No    Food Allergies  No    Neurological Disorder/Epilepsy No    Operations/Hospitalizations Yes Post-op ankle surgery in 2022, pt was hospitalized for 2 nights and received IV blood thinners. Patient had been experiencing shortness of breath and it was thought that she had a blood clot.    TB exposure  No    Thyroid Dysfunction Yes Graves disease diagnosed in 2011, patient takes levothyroxine 112mcg daily.   Trauma/Violence  No    Uterine Anomaly  No    Uterine Fibroids  Yes Hx uterine fibroids and ovarian cysts    Variocosities/DVTs Yes Received IV blood thinners in 2022, post-op ankle surgery   Smoker No     Drug usage in prior year No    Alcohol No    Would you accept a blood transfusion? If no, are you a Alevism? Yes                INFECTION HISTORY    Chlamydia No    Pt or partner have hx of Genital Herpes No    Gonorrhea No    Hepatitis B No    HIV No    HPV No    MRSA No    Syphilis No    Tattoos Yes 6 tattoos   Live with someone or Exposed to TB No    Rash or viral illness since LMP  No    Varicella Yes Chicken pox as a child   Pets Yes 1 dog, 1 fish     GENETICS SCREENING    Genetic Screening    Genetic Screening/Teratology Counseling- Includes patient, baby's father, or anyone in either family with:  Patient's age 35 years or older as of estimated date of delivery: No     Thalassemia (Italian, Greek, Mediterranean, or  background): MCV less than 80: No     Neural tube defect (Meningomyelocele, Spina bifida, or Anencephaly): No     Congenital heart defect: No     Down syndrome: No     Duncan-Sachs (Ashkenazi Voodoo, Cajun, Mohawk Belizean): No     Canavan disease (Ashkenazi Voodoo): No     Familial dysautonomia (Ashkenazi Voodoo): No     Sickle cell disease or trait (): No     Hemophilia or other blood disorders: No     Muscular dystrophy: No    Cystic fibrosis: No     [place]'s chorea: No     Intellectual disability and/or autism: No     Other inherited genetic or chromosomal disorder: No     Maternal metabolic disorder (eg. Type 1 diabetes, PKU): Yes (Comment: Maternal grandmother has hx Type 1 diabetes)     Patient or baby's father had child with birth defects not listed above: No     Recurrent pregnancy loss, or a stillbirth: No     Medications (including supplements, vitamins, herbs, or OTC drugs)/illicit/recreational drugs/alcohol since last menstrual period: Yes (Comment: levothyroxine 112 mcg daily, prenatal vitamin, choline)             MISC    Infant vaccinations  Yes    Pt. Has answered NO 5P questions and has NO  risk factors.    Pt. Given What pregnant women need to  know handout.

## 2024-10-29 RX ORDER — LEVOTHYROXINE SODIUM 112 UG/1
112 TABLET ORAL
Qty: 90 TABLET | Refills: 3 | Status: SHIPPED | OUTPATIENT
Start: 2024-10-29

## 2024-10-30 ENCOUNTER — TELEPHONE (OUTPATIENT)
Age: 34
End: 2024-10-30

## 2024-10-30 ENCOUNTER — OFFICE VISIT (OUTPATIENT)
Dept: NEUROLOGY | Facility: CLINIC | Age: 34
End: 2024-10-30
Payer: COMMERCIAL

## 2024-10-30 ENCOUNTER — LAB ENCOUNTER (OUTPATIENT)
Dept: LAB | Facility: HOSPITAL | Age: 34
End: 2024-10-30
Attending: Other
Payer: COMMERCIAL

## 2024-10-30 VITALS
DIASTOLIC BLOOD PRESSURE: 70 MMHG | SYSTOLIC BLOOD PRESSURE: 107 MMHG | WEIGHT: 225 LBS | HEIGHT: 65 IN | BODY MASS INDEX: 37.49 KG/M2 | HEART RATE: 88 BPM

## 2024-10-30 DIAGNOSIS — G43.009 MIGRAINE WITHOUT AURA: Primary | ICD-10-CM

## 2024-10-30 DIAGNOSIS — G43.009 MIGRAINE WITHOUT AURA AND WITHOUT STATUS MIGRAINOSUS, NOT INTRACTABLE: ICD-10-CM

## 2024-10-30 DIAGNOSIS — R20.2 PARESTHESIAS: ICD-10-CM

## 2024-10-30 DIAGNOSIS — R20.2 PARESTHESIA: ICD-10-CM

## 2024-10-30 DIAGNOSIS — G43.009 MIGRAINE WITHOUT AURA AND WITHOUT STATUS MIGRAINOSUS, NOT INTRACTABLE: Primary | ICD-10-CM

## 2024-10-30 LAB
FOLATE SERPL-MCNC: 20.4 NG/ML (ref 5.4–?)
VIT B12 SERPL-MCNC: 462 PG/ML (ref 211–911)
VZV IGG SER IA-ACNC: 1239 (ref 165–?)

## 2024-10-30 PROCEDURE — 3078F DIAST BP <80 MM HG: CPT | Performed by: OTHER

## 2024-10-30 PROCEDURE — 86038 ANTINUCLEAR ANTIBODIES: CPT | Performed by: OTHER

## 2024-10-30 PROCEDURE — 99204 OFFICE O/P NEW MOD 45 MIN: CPT | Performed by: OTHER

## 2024-10-30 PROCEDURE — 86255 FLUORESCENT ANTIBODY SCREEN: CPT | Performed by: OTHER

## 2024-10-30 PROCEDURE — 3074F SYST BP LT 130 MM HG: CPT | Performed by: OTHER

## 2024-10-30 PROCEDURE — 3008F BODY MASS INDEX DOCD: CPT | Performed by: OTHER

## 2024-10-30 PROCEDURE — 84446 ASSAY OF VITAMIN E: CPT | Performed by: OTHER

## 2024-10-30 NOTE — TELEPHONE ENCOUNTER
Hello Shirin,    Here are some therapy resources that may be a good fit. Please verify your insurance coverage with any providers that you may choose to call and schedule with directly. If there is anything else I can assist with, then please give me a call at [phone]. If you need more immediate assistance, or assistance outside of business hours, please contact the [facility] 24/7 helpline at [phone].    Yareli Abdalla LCPC  [facility]  [address]  [place] [address]   Phone: [phone]    Sana Elizabeth LCPC  [facility]  [address]  [address]  [place] [address]   Phone: [phone]    Mariaa Ludwig LCSW  [facility]  [address]  [address]  [place] [address]   Phone: [phone]      Adeola Schulz (she/her/hers)  Patient Care Navigator Mental Health   [facility]/Mental Health Division  [phone] or 24/7 help line: [phone]  [facility]/rajat  Request an assessment or support »

## 2024-10-30 NOTE — PROGRESS NOTES
[facility] [address], [address]  [facility] [address]  [phone]              Date: October 30, 2024  Patient Name: Shirin Cruz   MRN: [id number]    Reason for Evaluation: Dizziness     HPI:     Shirin Cruz is a 33 year old currently pregnant woman with past medical history of anxiety, Graves' disease status post radioactive iodine treatment with subsequent hypothyroidism, anemia who presents for evaluation of dizziness.    Patient had a tilt table test on 8/20/2024.  It was negative.  She has described presyncope and dizziness without loss of consciousness.  Valium has helped her symptoms.  She has had heart monitoring which is normal.  An echo was normal.  CT brain was ordered in July but not completed.    May 2024: was at work, stood up, almost blacked out, heart was racing, sat down to catch her breath.  Occurring every other day.  Felt dizzy, like she was going to pass out.  Felt tingling in her legs, heart palpitations, getting migraines.  Migraine rescue medications did not  help.      Migraines: usually right retro-orbital but varying locations in her head, daily.  Feels dizziness daily.  Lightheadedness + vertigo \"my head is moving.\"  Dizziness not associated with headache.  Pulsating, photophobia, phonophobia, no nausea.  Duration: 1.5 hours until pain fades, does not recur later in the day.      Tingling in her bilateral lower extremities, shock-like from her knees inferiorly.  No tingling in feet or calves - always radiating symptoms.  Not provoked by movement.  Thinks it may be related to sedentary job.      OUTPATIENT MEDICATIONS  Medications Ordered Prior to Encounter[1]    MEDICAL HISTORY  Past Medical History:    Abdominal pain    Acute epiploic appendagitis    Anemia    Anxiety    took Lorazepam for anxiety for 1-2 months, started taking 1mg, then decreased to taking 0.5mg    Decorative tattoo    Disorder of thyroid    Graves disease    Radioactive iodine  tx.    Hypothyroidism       SURGICAL HISTORY  Past Surgical History:   Procedure Laterality Date    Ankle fracture surgery Right 2022    metal lizette in right ankle due to fracture      2020    Other Right 2022    Root Cannal       SOCIAL HISTORY  Social History     Socioeconomic History    Marital status:    Tobacco Use    Smoking status: Never    Smokeless tobacco: Never   Vaping Use    Vaping status: Never Used   Substance and Sexual Activity    Alcohol use: Never    Drug use: Never    Sexual activity: Yes   Other Topics Concern    Caffeine Concern No       FAMILY HISTORY  Family History   Problem Relation Age of Onset    Hypertension Father     Prostate Cancer Father     Other (Other) Father         Hyperthyroidism    Diabetes Father     Hypertension Mother     Diabetes Maternal Grandmother     Other (Other) Sister         Graves disease    Cancer Neg     Heart Disease Neg        ALLERGIES  Allergies[2]    REVIEW OF SYSTEMS:   13-point review of systems was done and is negative unless otherwise stated in HPI.     PHYSICAL EXAM:   /70 (BP Location: Left arm, Patient Position: Sitting)   Pulse 88   Ht 65\"   Wt 225 lb (102.1 kg)   LMP 2024   BMI 37.44 kg/m²   General appearance: Well appearing, alert and in no acute distress  Skin: skin color normal.  No rashes or lesions.    Head: Normocephalic, atraumatic.    Neurological exam:    Mental Status:   Attention/Concentration: intact attention on bedside test   Fund of knowledge: intact  Speech: no dysarthria or aphasia     Cranial Nerves:  Pupils: OD 4 mm to 3 mm;  OS 4 mm to 3 mm   Visual Fields: R and L visual fields full to confrontation  CN III, CN IV, CN VI (Extraocular movements): intact.    CN V:   intact sensation to light touch in all three divisions of the trigeminal nerves bilaterally.  CN VII: normal facial muscle strength bilaterally  CN VIII: auditory acuity intact to bedside testing  CN IX/CN X: normal  palate elevation  CN XI: normal movement SCM muscles bilaterally.    Left: no greater occipital nerve tenderness, no lesser occipital nerve tenderness   Right: no greater occipital nerve tenderness, no lesser occipital nerve tenderness     Motor Exam:   Muscle Bulk: No atrophy or fasciculations   Muscle Tone: normal tone in upper and lower extremities   Involuntary movements: none    Strength:       Deltoid  C5 Biceps  C5-6  Triceps  C7 Wrist extensors  C7-8 Wrist flexors  C7 Finger flexors  C6-8 Finger extensors   C8 Finger abductors  C8   Thumb abductors  T1    R 5 5 5 5 5 5 5 5 5   L 5 5 5 5 5 5 5 5 5        Hip flexion (Iliopsoas) L2-4  Knee flexion  (Hamstrings)  L5-S1   Knee Extension (Quadriceps)  L3, L4  Ankle plantarflexion  S1 Ankle Dorsiflexion  Tib Anterior   L5   R 5 5 5 5 5   L 5 5 5 5 5       Sensory:   Light touch: intact in all 4 extremities.   Pain:  intact in all 4 extremities.   Vibration: intact in all 4 extremities.     Reflexes:   2+ in UE; patella 2    Coordination:   Normal: Finger to nose: no ataxia or dysmetria     Gait:   Arises independently; normal posture; gait stable with normal stride length, rate, base and arm swing.       LABS/DATA:  Negative HIV, syphilis screen, normal TSH, normal CBC and CMP, she is vitamin D deficient      IMAGING:  No neuroimaging    ASSESSMENT:  The patient is a 33 year old currently pregnant woman with past medical history of anxiety, Graves' disease status post radioactive iodine treatment with subsequent hypothyroidism, anemia who presents for evaluation of dizziness with complaint of both lightheadedness and vertigo, migraine headaches without aura and radiating tingling in her lower extremities from her knees to her toes.  Examination is nonfocal.    May be metabolic versus structural.  She would benefit from an MRI of her brain and cervical spine as her exam is nonfocal and symptoms have been going on for several months and she is currently pregnant, we  can start with more conservative testing first with an EEG (though suspicion for seizure is low) hand blood work.  When or if it is okay to pursue an MRI from her OBs perspective, we can discuss it at that time.    Follow-up in 3 months    Discussed indication, administration, dose, and side effects with patient of any medications personally prescribed. Patient was advised to let my office know if they have any questions or concerns.       Today, I personally spent 25 minutes in this case, including chart review, time spent with patient doing face to face evaluation w/ interview and exam and patient education, counseling, and time was spent in patient education, counseling, and coordination of care as described above.   Issues discussed: Diagnosis and implications on future health, benefits and side effects of present and future medications, test results as well as further testing and medications required.    This note was prepared using Dragon Medical voice recognition dictation software and as a result, errors may occur. When identified, these errors have been corrected. While every attempt is made to correct errors during dictation, discrepancies may still exist    SHERRY Ruiz DO   Staff Neurologist   10/30/2024  8:23 AM                     [1]   Current Outpatient Medications on File Prior to Visit   Medication Sig Dispense Refill    Prenatal Multivit-Min-Fe-FA (PRENATAL VITAMINS OR) Take by mouth.      levothyroxine 112 MCG Oral Tab Take 1 tablet (112 mcg total) by mouth before breakfast. 90 tablet 3     No current facility-administered medications on file prior to visit.   [2] No Known Allergies

## 2024-11-01 LAB
DSDNA IGG SERPL IA-ACNC: 3.5 IU/ML
ENA AB SER QL IA: <0.09 UG/L
ENA AB SER QL IA: NEGATIVE

## 2024-11-02 ENCOUNTER — TELEPHONE (OUTPATIENT)
Dept: PEDIATRICS CLINIC | Facility: CLINIC | Age: 34
End: 2024-11-02

## 2024-11-02 ENCOUNTER — TELEPHONE (OUTPATIENT)
Dept: OBGYN CLINIC | Facility: CLINIC | Age: 34
End: 2024-11-02

## 2024-11-02 ENCOUNTER — INITIAL PRENATAL (OUTPATIENT)
Dept: OBGYN CLINIC | Facility: CLINIC | Age: 34
End: 2024-11-02
Payer: COMMERCIAL

## 2024-11-02 VITALS
SYSTOLIC BLOOD PRESSURE: 116 MMHG | DIASTOLIC BLOOD PRESSURE: 77 MMHG | WEIGHT: 229 LBS | HEART RATE: 97 BPM | BODY MASS INDEX: 38 KG/M2

## 2024-11-02 DIAGNOSIS — Z34.91 PREGNANCY WITH UNCERTAIN DATES IN FIRST TRIMESTER (HCC): ICD-10-CM

## 2024-11-02 DIAGNOSIS — Z34.82 ENCOUNTER FOR SUPERVISION OF OTHER NORMAL PREGNANCY IN SECOND TRIMESTER (HCC): Primary | ICD-10-CM

## 2024-11-02 PROBLEM — O99.211 OBESITY AFFECTING PREGNANCY IN FIRST TRIMESTER (HCC): Status: ACTIVE | Noted: 2024-11-02

## 2024-11-02 PROBLEM — E07.9 THYROID DISEASE DURING PREGNANCY IN FIRST TRIMESTER (HCC): Status: ACTIVE | Noted: 2024-11-02

## 2024-11-02 PROBLEM — O99.281 THYROID DISEASE DURING PREGNANCY IN FIRST TRIMESTER (HCC): Status: ACTIVE | Noted: 2024-11-02

## 2024-11-02 PROBLEM — Z98.891 PREVIOUS CESAREAN SECTION: Status: ACTIVE | Noted: 2024-11-02

## 2024-11-02 LAB
APPEARANCE: CLEAR
COPPER: 208 UG/DL
GLUCOSE (URINE DIPSTICK): NEGATIVE MG/DL
MULTISTIX LOT#: NORMAL NUMERIC
NITRITE, URINE: NEGATIVE
URINE-COLOR: YELLOW

## 2024-11-02 PROCEDURE — 3078F DIAST BP <80 MM HG: CPT | Performed by: OBSTETRICS & GYNECOLOGY

## 2024-11-02 PROCEDURE — 81002 URINALYSIS NONAUTO W/O SCOPE: CPT | Performed by: OBSTETRICS & GYNECOLOGY

## 2024-11-02 PROCEDURE — 3074F SYST BP LT 130 MM HG: CPT | Performed by: OBSTETRICS & GYNECOLOGY

## 2024-11-02 NOTE — TELEPHONE ENCOUNTER
Patient needs to be seen in 4 weeks 11/23 and then 4 weeks after that 12/23. Advised patient she will receive call to schedule appt.

## 2024-11-02 NOTE — TELEPHONE ENCOUNTER
Patient accepts [facility] appointments with Dr. Garcia on 12/7 (patient aware Dr. Garcia is on-call), and with Dr. Fox on 1/7.

## 2024-11-02 NOTE — TELEPHONE ENCOUNTER
Pt wishes / needs the following. Please do referral & send MFM order for:    [ x ]  first trimester screen -- getting dating ultrasound -- bedside states only 11 wks currently    DX:  BMI 38     [  ]  medical consult    [  ]  16 wk cervical length    [ x ]  level 2 ultrasound    [   ] fetal echo    [ x ]  growth ultrasound at 32 wks    [  ]  serial ultrasound    [x  ]  NSTs once 36 weeks

## 2024-11-04 PROBLEM — Z34.90 UNCERTAIN DATES, ANTEPARTUM (HCC): Status: ACTIVE | Noted: 2024-11-04

## 2024-11-04 LAB
ANTI-HU AB: NEGATIVE
ANTI-RI AB: NEGATIVE
ANTI-YO AB: NEGATIVE
C TRACH DNA SPEC QL NAA+PROBE: NEGATIVE
N GONORRHOEA DNA SPEC QL NAA+PROBE: NEGATIVE
T VAGINALIS RRNA SPEC QL NAA+PROBE: NEGATIVE
VITAMIN B1 WHOLE BLD: 93.4 NMOL/L

## 2024-11-05 NOTE — PROGRESS NOTES
Neg / neg 11/23. GC/ChlTrich done. Needs 3hr GTT. Needs stat dating ultrasound. Wishes for first trimester screen / level 2 ultrasound. Plan for rCSx at 39 wks. Reveiwed need for closer monitor of thyroid.

## 2024-11-06 LAB
METHYLMALONIC ACID: 71 NMOL/L
VIT E ALPHA TOCO: 11.9 MG/L
VIT E GAMMA TOCO: 1.8 MG/L

## 2024-11-07 LAB — VITAMIN B6: 8.8 UG/L

## 2024-11-16 ENCOUNTER — LABORATORY ENCOUNTER (OUTPATIENT)
Dept: LAB | Facility: HOSPITAL | Age: 34
End: 2024-11-16
Attending: OBSTETRICS & GYNECOLOGY
Payer: COMMERCIAL

## 2024-11-16 DIAGNOSIS — Z34.82 ENCOUNTER FOR SUPERVISION OF OTHER NORMAL PREGNANCY IN SECOND TRIMESTER (HCC): ICD-10-CM

## 2024-11-16 LAB
GLUCOSE 1H P GLC SERPL-MCNC: 180 MG/DL
GLUCOSE 2H P GLC SERPL-MCNC: 152 MG/DL
GLUCOSE 3H P GLC SERPL-MCNC: 95 MG/DL (ref 70–140)
GLUCOSE P FAST SERPL-MCNC: 85 MG/DL

## 2024-11-16 PROCEDURE — 36415 COLL VENOUS BLD VENIPUNCTURE: CPT

## 2024-11-16 PROCEDURE — 82952 GTT-ADDED SAMPLES: CPT

## 2024-11-16 PROCEDURE — 82951 GLUCOSE TOLERANCE TEST (GTT): CPT

## 2024-11-20 ENCOUNTER — HOSPITAL ENCOUNTER (OUTPATIENT)
Dept: ULTRASOUND IMAGING | Age: 34
Discharge: HOME | End: 2024-11-20
Attending: OBSTETRICS & GYNECOLOGY
Payer: COMMERCIAL

## 2024-11-20 DIAGNOSIS — Z34.91 PREGNANCY WITH UNCERTAIN DATES IN FIRST TRIMESTER (HCC): ICD-10-CM

## 2024-11-20 PROCEDURE — 76801 OB US < 14 WKS SINGLE FETUS: CPT | Performed by: OBSTETRICS & GYNECOLOGY

## 2024-12-07 ENCOUNTER — ROUTINE PRENATAL (OUTPATIENT)
Dept: OBGYN CLINIC | Facility: CLINIC | Age: 34
End: 2024-12-07
Payer: COMMERCIAL

## 2024-12-07 VITALS
BODY MASS INDEX: 38 KG/M2 | DIASTOLIC BLOOD PRESSURE: 73 MMHG | WEIGHT: 229 LBS | HEART RATE: 89 BPM | SYSTOLIC BLOOD PRESSURE: 110 MMHG

## 2024-12-07 DIAGNOSIS — Z34.92 ENCOUNTER FOR SUPERVISION OF NORMAL PREGNANCY IN SECOND TRIMESTER, UNSPECIFIED GRAVIDITY (HCC): Primary | ICD-10-CM

## 2024-12-07 PROBLEM — R00.0 TACHYCARDIA: Status: RESOLVED | Noted: 2022-03-01 | Resolved: 2024-12-07

## 2024-12-07 PROBLEM — R06.02 SHORTNESS OF BREATH: Status: RESOLVED | Noted: 2022-03-01 | Resolved: 2024-12-07

## 2024-12-07 PROBLEM — S82.851G: Status: RESOLVED | Noted: 2022-02-25 | Resolved: 2024-12-07

## 2024-12-07 PROBLEM — S93.431D ANKLE SYNDESMOSIS DISRUPTION, RIGHT, SUBSEQUENT ENCOUNTER: Status: RESOLVED | Noted: 2022-02-25 | Resolved: 2024-12-07

## 2024-12-07 LAB
GLUCOSE (URINE DIPSTICK): NEGATIVE MG/DL
MULTISTIX LOT#: ABNORMAL NUMERIC
NITRITE, URINE: NEGATIVE
OCCULT BLOOD: NEGATIVE
PH, URINE: 6.5 (ref 4.5–8)
SPECIFIC GRAVITY: 1.01 (ref 1–1.03)
UROBILINOGEN,SEMI-QN: 0.2 MG/DL (ref 0–1.9)

## 2024-12-07 PROCEDURE — 81002 URINALYSIS NONAUTO W/O SCOPE: CPT | Performed by: OBSTETRICS & GYNECOLOGY

## 2024-12-07 PROCEDURE — 3078F DIAST BP <80 MM HG: CPT | Performed by: OBSTETRICS & GYNECOLOGY

## 2024-12-07 PROCEDURE — 90471 IMMUNIZATION ADMIN: CPT | Performed by: OBSTETRICS & GYNECOLOGY

## 2024-12-07 PROCEDURE — 90656 IIV3 VACC NO PRSV 0.5 ML IM: CPT | Performed by: OBSTETRICS & GYNECOLOGY

## 2024-12-07 PROCEDURE — 3074F SYST BP LT 130 MM HG: CPT | Performed by: OBSTETRICS & GYNECOLOGY

## 2025-01-07 ENCOUNTER — PATIENT MESSAGE (OUTPATIENT)
Dept: INTERNAL MEDICINE CLINIC | Facility: CLINIC | Age: 35
End: 2025-01-07

## 2025-01-07 ENCOUNTER — ROUTINE PRENATAL (OUTPATIENT)
Dept: OBGYN CLINIC | Facility: CLINIC | Age: 35
End: 2025-01-07
Payer: COMMERCIAL

## 2025-01-07 VITALS
HEART RATE: 81 BPM | DIASTOLIC BLOOD PRESSURE: 72 MMHG | SYSTOLIC BLOOD PRESSURE: 105 MMHG | BODY MASS INDEX: 39 KG/M2 | WEIGHT: 233 LBS

## 2025-01-07 DIAGNOSIS — E89.0 HYPOTHYROIDISM, POSTABLATIVE: ICD-10-CM

## 2025-01-07 DIAGNOSIS — E05.90 HYPERTHYROIDISM: Primary | ICD-10-CM

## 2025-01-07 DIAGNOSIS — E07.9: ICD-10-CM

## 2025-01-07 DIAGNOSIS — Z34.92 ENCOUNTER FOR SUPERVISION OF NORMAL PREGNANCY IN SECOND TRIMESTER, UNSPECIFIED GRAVIDITY (HCC): Primary | ICD-10-CM

## 2025-01-07 DIAGNOSIS — O99.281: ICD-10-CM

## 2025-01-07 LAB
APPEARANCE: CLEAR
BILIRUBIN: NEGATIVE
GLUCOSE (URINE DIPSTICK): NEGATIVE MG/DL
KETONES (URINE DIPSTICK): NEGATIVE MG/DL
LEUKOCYTES: NEGATIVE
MULTISTIX LOT#: NORMAL NUMERIC
NITRITE, URINE: NEGATIVE
OCCULT BLOOD: NEGATIVE
PH, URINE: 6.5 (ref 4.5–8)
PROTEIN (URINE DIPSTICK): NEGATIVE MG/DL
SPECIFIC GRAVITY: 1.01 (ref 1–1.03)
URINE-COLOR: YELLOW
UROBILINOGEN,SEMI-QN: 0.2 MG/DL (ref 0–1.9)

## 2025-01-07 PROCEDURE — 3074F SYST BP LT 130 MM HG: CPT | Performed by: OBSTETRICS & GYNECOLOGY

## 2025-01-07 PROCEDURE — 3078F DIAST BP <80 MM HG: CPT | Performed by: OBSTETRICS & GYNECOLOGY

## 2025-01-07 PROCEDURE — 81002 URINALYSIS NONAUTO W/O SCOPE: CPT | Performed by: OBSTETRICS & GYNECOLOGY

## 2025-01-08 ENCOUNTER — HOSPITAL ENCOUNTER (OUTPATIENT)
Dept: PERINATAL CARE | Facility: HOSPITAL | Age: 35
Discharge: HOME | End: 2025-01-08
Attending: OBSTETRICS & GYNECOLOGY
Payer: COMMERCIAL

## 2025-01-08 VITALS
HEART RATE: 98 BPM | DIASTOLIC BLOOD PRESSURE: 71 MMHG | SYSTOLIC BLOOD PRESSURE: 113 MMHG | BODY MASS INDEX: 38 KG/M2 | WEIGHT: 231 LBS

## 2025-01-08 DIAGNOSIS — E07.9 THYROID DISEASE DURING PREGNANCY IN SECOND TRIMESTER (HCC): ICD-10-CM

## 2025-01-08 DIAGNOSIS — Z36.89 ENCOUNTER FOR FETAL ANATOMIC SURVEY (HCC): ICD-10-CM

## 2025-01-08 DIAGNOSIS — O99.282 THYROID DISEASE DURING PREGNANCY IN SECOND TRIMESTER (HCC): ICD-10-CM

## 2025-01-08 DIAGNOSIS — O99.212 OBESITY AFFECTING PREGNANCY IN SECOND TRIMESTER, UNSPECIFIED OBESITY TYPE (HCC): ICD-10-CM

## 2025-01-08 DIAGNOSIS — O99.212 OBESITY AFFECTING PREGNANCY IN SECOND TRIMESTER, UNSPECIFIED OBESITY TYPE (HCC): Primary | ICD-10-CM

## 2025-01-08 PROCEDURE — 76811 OB US DETAILED SNGL FETUS: CPT | Performed by: OBSTETRICS & GYNECOLOGY

## 2025-01-08 NOTE — PROGRESS NOTES
C/o intermittent HA's- rec tylenol with caffeinated beverage, c/o hemorrhoid pain- rec anusol OTC, discussed GTT next visit

## 2025-01-08 NOTE — PROGRESS NOTES
Reason for Consult:   Dear Dr. Ontiveros,    Thank you for requesting ultrasound evaluation and maternal fetal medicine consultation on Shirin Cruz.  As you are aware she is a 34 year old female with a Dickerson pregnancy .  A maternal-fetal medicine consultation was requested secondary to  obesity and hypothyroidism.  Her prenatal records and labs were reviewed.    Review of History:     OB History:    OB History    Para Term  AB Living   2 1 1 0 0 1   SAB IAB Ectopic Multiple Live Births   0 0 0 0 1      # Outcome Date GA Lbr Jose/2nd Weight Sex Type Anes PTL Lv   2 Current            1 Term 20 40w3d  7 lb 5.5 oz (3.33 kg) F Caesarean EPI N RAYMOND      Complications: Failure to progress      Name: SUNNY,GIRL      Apgar1: 8  Apgar5: 9             Allergies:  Allergies[1]   Current Meds:  Current Outpatient Medications   Medication Sig Dispense Refill    Prenatal Multivit-Min-Fe-FA (PRENATAL VITAMINS OR) Take by mouth.      levothyroxine 112 MCG Oral Tab Take 1 tablet (112 mcg total) by mouth before breakfast. 90 tablet 3        HISTORY:  Past Medical History:    Abdominal pain    Acute epiploic appendagitis    Anemia    Anxiety    took Lorazepam for anxiety for 1-2 months, started taking 1mg, then decreased to taking 0.5mg    Decorative tattoo    Disorder of thyroid    Graves disease    Radioactive iodine tx.    Hypothyroidism      Past Surgical History:   Procedure Laterality Date    Ankle fracture surgery Right 2022    metal lizette in right ankle due to fracture      2020    Other Right 2022    Root Cannal      Family History   Problem Relation Age of Onset    Hypertension Father     Prostate Cancer Father     Other (Other) Father         Hyperthyroidism    Diabetes Father     Hypertension Mother     Diabetes Maternal Grandmother     Other (Other) Sister         Graves disease    Cancer Neg     Heart Disease Neg       Social History     Socioeconomic History    Marital  status:    Tobacco Use    Smoking status: Never    Smokeless tobacco: Never   Vaping Use    Vaping status: Never Used   Substance and Sexual Activity    Alcohol use: Never    Drug use: Never    Sexual activity: Yes   Other Topics Concern    Caffeine Concern No     Social Drivers of Health     Financial Resource Strain: Low Risk  (10/27/2024)    Financial Resource Strain     Difficulty of Paying Living Expenses: Somewhat hard     Med Affordability: No   Food Insecurity: No Food Insecurity (10/27/2024)    Food Insecurity     Food Insecurity: Never true   Transportation Needs: No Transportation Needs (10/27/2024)    Transportation Needs     Lack of Transportation: No   Stress: Stress Concern Present (10/27/2024)    Stress     Feeling of Stress : Yes   Housing Stability: Low Risk  (10/27/2024)    Housing Stability     Housing Instability: No        NARRATIVE:     /71   Pulse 98   Wt 231 lb (104.8 kg)   LMP 2024 (Within Days)   BMI 38.44 kg/m²           Alert and Oriented.  No acute distress          Abdomen:  soft, nontender, no contractions noted.           extremities:  nontender, no edema    DISCUSSION  During her visit we discussed and reviewed the following issues:    OBESITY:  Obesity during pregnancy is associated with numerous maternal and  risks.  It is not clear whether obesity is a direct cause of adverse pregnancy outcome or whether the association between obesity and adverse pregnancy outcome is due to factors such as diabetes mellitus.   Data suggest that obese women should be encouraged to undertake a weight reduction program (diet, exercise, behavior modification, and possibly bariatric surgery in some cases) prior to attempting to conceive.            Subfertility in obese women is most commonly related to ovulatory dysfunction, and, in some obese women, the ovulatory dysfunction is related to polycystic ovary syndrome (PCOS). It is also important to note that even among  ovulatory women, increasing obesity is associated with decreasing spontaneous pregnancy rates.  The increased risk of miscarriage in obese women may be because such women often have PCOS or isolated insulin resistance.                 Due to its strong association with obesity in the general population, type 2 diabetes mellitus is one of the two most common medical complications of the obese . The increased risk of type 2 diabetes is primarily related to an exaggerated increase in insulin resistance in the obese state. It is reasonable to screen obese gravidas for undiagnosed pregestational diabetes in the first trimester.   Glucose intolerance associated with gestational diabetes generally resolves postpartum; however, obese women with a history of gestational diabetes have a two-fold increased prevalence of subsequent type 2 diabetes.           An association between obesity and hypertensive disorders during pregnancy has been consistently reported.  In particular, maternal weight and BMI are independent risk factors for preeclampsia.             Studies have found that the increased risk of  birth in obese gravidas is primarily associated with obesity-related medical and  complications, rather than an intrinsic predisposition to spontaneous  birth. Prevention of  birth in these patients, therefore, should be directed toward prevention or management of medical and obstetrical complications.               Both prepregnancy obesity and excessive maternal weight gain before or during pregnancy contribute to an increased probability of  delivery.  It has also been hypothesized that obesity may lead to dystocia due to increased soft tissue deposition in the maternal pelvis.    delivery in the obese  is associated with numerous perioperative concerns, including emergency delivery, prolonged incision to delivery interval, blood loss >1000 mL, longer operative  times, wound infection, thromboembolism, and endometritis.            Maternal obesity appears to be associated with a small increase in the absolute rate of some congenital anomalies, and the risk may increase with increasing maternal weight.  The risk of neural tube defects increased significantly with maternal weight.    The analysis found that overweight and obese pregnant women experienced significantly more stillbirths than normal weight women.      Increase  testing and Level 2 Ultrasound is recommended.     -------------    Overt hypothyroidism (elevated TSH, reduced free T4) complicating pregnancy is unusual. Two factors contribute to this finding: some hypothyroid women are anovulatory, and hypothyroidism (new or inadequately treated) complicating pregnancy is associated with an increased rate of first trimester spontaneous .  The risk of complications during pregnancy is lower in women with subclinical, rather than overt hypothyroidism. However, in some studies, women with subclinical hypothyroidism were also reported to be at increased risk for severe preeclampsia,  delivery, placental abruption, and/or pregnancy loss.  Isolated maternal hypothyroxinemia (low T4) is defined as a maternal free T4 concentration in the lower 5th or 10th percentile of the reference range, in conjunction with a normal TSH. The effect of isolated maternal hypothyroxinemia on  and  outcome is unclear.     Hypothyroidism has been associated with an increased risk of several complications, including:  ?Preeclampsia and gestational hypertension  ?Placental abruption  ?Nonreassuring fetal heart rate tracing  ? delivery, including very  delivery (before 32 weeks)  ?Low birth weight  ?Increased rate of  section  ? morbidity and mortality  ?Neuropsychological and cognitive impairment  ?Postpartum hemorrhage    All pregnant women with newly diagnosed overt  hypothyroidism (thyroid-stimulating hormone [TSH] above trimester-specific normal reference range with low free thyroxine [T4]) should be treated with thyroid hormone (T4). In addition, because maternal euthyroidism is potentially important for normal fetal cognitive development, it is suggested to treat pregnant women with subclinical hypothyroidism.  Because of the changes in thyroid physiology during normal pregnancy, thyroid function tests should be interpreted using trimester-specific TSH and T4 reference ranges for pregnant women. The upper limit of normal for TSH in the first trimester of pregnancy is approximately 2.5 mU/L (3.0 mU/L in the second and third trimesters) rather than 4.5 to 5.0 mU/L used by most laboratories. Total T4 and T3 levels during pregnancy are 1.5-fold higher than in nonpregnant women.   We do not suggest the treatment of pregnant women with isolated hypothyroxinemia (low free T4, normal TSH).  Thyroid function should be monitored throughout the pregnancy by assessing TSH and FT4 or FT3 about every 4-8 weeks and more frequently if the clinical situation dictates. Adjust the dose appropriately with the goal of maintaining the FT3/FT4 in the upper levels of the normal range and avoid over treatment. Perform ultrasound in the third trimester and as needed to assess growth and potential goiter.         OB ULTRASOUND REPORT   See imaging tab for complete ultrasound report or in PACS    Single IUP in cephalic presentation.    Placenta is posterior.   A 3 vessel cord is noted.  Cardiac activity is present at 151 bpm   g ( 0 lb 14 oz);   MVP is 4.8 cm .         Fetal Anatomy:  Visualized with normal appearance: head, face, spine, neck, skin, chest, abdominal wall, gastrointestinal tract, kidneys, bladder, extremities.   Brain: Visualized and normal appearances: brain parenchyma, cerebral ventricles, choroid plexus, Cisterna Magna, midline falx, cerebellum, cerebellar lobes, posterior  fossa, vermis, cavum septi pellucidi.  Face: eyes normal, profile normal, nose normal, lip normal, palate normal.  Heart: visualized and normal appearance: 3 vessel view, four-chamber, left outflow tract, right outflow tract, arches.      Genetic Sonogram:  Nuchal fold normal.  Pyelectasis absent.  No hyperechogenic bowel.  Echogenic intracardiac foci absent. Nasal bone present. Choroid plexus cyst absent.      Summary of Ultrasound findings:  This is a Dickerson pregnancy    The fetal measurements are consistent with established EDC. No gross ultrasound evidence of structural abnormalities are seen today. No minor markers for aneuploidy are seen. The patient understands that ultrasound cannot rule out all structural and chromosomal abnormalities.       IMPRESSION:   1. IUP @  21w1d  2. Scan consistent with dates  3. No fetal structural abnormalities seen  4. Obesity BMI 38  5. Hypothyroidism     RECOMMENDATIONS:   Early 1 hr gtt  11-20 lb weight gain for pregnancy  Growth and BPP ultrasound at 32 weeks  Weekly NSTs at 36 weeks  Check thyroid function every 8 wks    Thank you for allowing me to participate in the care of your patient.  Please do not hesitate to call with any questions or concerns.     Total time spent   45  minutes this calendar day which includes preparing to see the patient including chart review, obtaining and/or reviewing additional medical history, performing a physical exam and evaluation, documenting clinical information in the electronic medical record, independently interpreting results, counseling the patient, communicating results to the patient/family/caregiver and coordinating care.    Hank Young D.O.  Maternal Fetal Medicine     Note to patient and family:  The 21st Century Cures Act makes medical notes available to patients in the interest of transparency.  However, please be advised that this is a medical document.  It is intended as a peer to peer communication.  It is written  in medical language and may contain abbreviations or verbiage that are technical and unfamiliar.  It may appear blunt or direct.  Medical documents are intended to carry relevant information, facts as evident, and the clinical opinion of the practitioner.         [1] No Known Allergies

## 2025-01-08 NOTE — TELEPHONE ENCOUNTER
TSH w/ reflex T4 and T3 lab orders placed for pt today per Dr Ryan.  Pt informed of order placement via [employer] message today.

## 2025-02-01 ENCOUNTER — LAB ENCOUNTER (OUTPATIENT)
Dept: LAB | Facility: HOSPITAL | Age: 35
End: 2025-02-01
Attending: FAMILY MEDICINE
Payer: COMMERCIAL

## 2025-02-01 DIAGNOSIS — E05.90 HYPERTHYROIDISM: ICD-10-CM

## 2025-02-01 DIAGNOSIS — E07.9: ICD-10-CM

## 2025-02-01 DIAGNOSIS — E89.0 HYPOTHYROIDISM, POSTABLATIVE: ICD-10-CM

## 2025-02-01 DIAGNOSIS — O99.281: ICD-10-CM

## 2025-02-01 LAB
T3FREE SERPL-MCNC: 2.47 PG/ML (ref 2.4–4.2)
TSI SER-ACNC: 2.48 UIU/ML (ref 0.55–4.78)

## 2025-02-01 PROCEDURE — 84481 FREE ASSAY (FT-3): CPT | Performed by: FAMILY MEDICINE

## 2025-02-01 PROCEDURE — 84443 ASSAY THYROID STIM HORMONE: CPT | Performed by: FAMILY MEDICINE

## 2025-02-13 ENCOUNTER — ROUTINE PRENATAL (OUTPATIENT)
Dept: OBGYN CLINIC | Facility: CLINIC | Age: 35
End: 2025-02-13
Payer: COMMERCIAL

## 2025-02-13 VITALS
HEART RATE: 86 BPM | BODY MASS INDEX: 39 KG/M2 | DIASTOLIC BLOOD PRESSURE: 71 MMHG | WEIGHT: 234.19 LBS | SYSTOLIC BLOOD PRESSURE: 102 MMHG

## 2025-02-13 DIAGNOSIS — Z34.93 ENCOUNTER FOR SUPERVISION OF NORMAL PREGNANCY IN THIRD TRIMESTER, UNSPECIFIED GRAVIDITY (HCC): Primary | ICD-10-CM

## 2025-02-13 LAB
GLUCOSE (URINE DIPSTICK): NEGATIVE MG/DL
KETONES (URINE DIPSTICK): NEGATIVE MG/DL
MULTISTIX LOT#: ABNORMAL NUMERIC
NITRITE, URINE: NEGATIVE
OCCULT BLOOD: NEGATIVE
PH, URINE: 5 (ref 4.5–8)
PROTEIN (URINE DIPSTICK): NEGATIVE MG/DL
SPECIFIC GRAVITY: 1.01 (ref 1–1.03)
UROBILINOGEN,SEMI-QN: 0.2 MG/DL (ref 0–1.9)

## 2025-02-13 PROCEDURE — 3078F DIAST BP <80 MM HG: CPT | Performed by: OBSTETRICS & GYNECOLOGY

## 2025-02-13 PROCEDURE — 3074F SYST BP LT 130 MM HG: CPT | Performed by: OBSTETRICS & GYNECOLOGY

## 2025-02-13 PROCEDURE — 81002 URINALYSIS NONAUTO W/O SCOPE: CPT | Performed by: OBSTETRICS & GYNECOLOGY

## 2025-02-14 NOTE — PROGRESS NOTES
Feeling well.  Good fetal movement.  Normal level 2 . Has 2T labs---wants 3h gtt right away.  RTC 2 wks.

## 2025-02-15 ENCOUNTER — LAB ENCOUNTER (OUTPATIENT)
Dept: LAB | Facility: REFERENCE LAB | Age: 35
End: 2025-02-15
Attending: OBSTETRICS & GYNECOLOGY
Payer: COMMERCIAL

## 2025-02-15 DIAGNOSIS — Z34.93 ENCOUNTER FOR SUPERVISION OF NORMAL PREGNANCY IN THIRD TRIMESTER, UNSPECIFIED GRAVIDITY (HCC): ICD-10-CM

## 2025-02-15 LAB
DEPRECATED RDW RBC AUTO: 43.8 FL (ref 35.1–46.3)
ERYTHROCYTE [DISTWIDTH] IN BLOOD BY AUTOMATED COUNT: 14.4 % (ref 11–15)
GLUCOSE 1H P GLC SERPL-MCNC: 176 MG/DL
GLUCOSE 2H P GLC SERPL-MCNC: 151 MG/DL
GLUCOSE 3H P GLC SERPL-MCNC: 107 MG/DL (ref 70–140)
GLUCOSE P FAST SERPL-MCNC: 89 MG/DL
HCT VFR BLD AUTO: 35.6 %
HGB BLD-MCNC: 11.1 G/DL
MCH RBC QN AUTO: 26.2 PG (ref 26–34)
MCHC RBC AUTO-ENTMCNC: 31.2 G/DL (ref 31–37)
MCV RBC AUTO: 84.2 FL
PLATELET # BLD AUTO: 249 10(3)UL (ref 150–450)
RBC # BLD AUTO: 4.23 X10(6)UL
WBC # BLD AUTO: 7.7 X10(3) UL (ref 4–11)

## 2025-02-15 PROCEDURE — 82952 GTT-ADDED SAMPLES: CPT

## 2025-02-15 PROCEDURE — 82951 GLUCOSE TOLERANCE TEST (GTT): CPT

## 2025-02-15 PROCEDURE — 36415 COLL VENOUS BLD VENIPUNCTURE: CPT

## 2025-02-15 PROCEDURE — 85027 COMPLETE CBC AUTOMATED: CPT

## 2025-02-27 ENCOUNTER — ROUTINE PRENATAL (OUTPATIENT)
Dept: OBGYN CLINIC | Facility: CLINIC | Age: 35
End: 2025-02-27
Payer: COMMERCIAL

## 2025-02-27 ENCOUNTER — TELEPHONE (OUTPATIENT)
Dept: OBGYN CLINIC | Facility: CLINIC | Age: 35
End: 2025-02-27

## 2025-02-27 VITALS
DIASTOLIC BLOOD PRESSURE: 75 MMHG | WEIGHT: 236 LBS | BODY MASS INDEX: 39 KG/M2 | SYSTOLIC BLOOD PRESSURE: 116 MMHG | HEART RATE: 85 BPM

## 2025-02-27 DIAGNOSIS — Z34.92 ENCOUNTER FOR SUPERVISION OF NORMAL PREGNANCY IN SECOND TRIMESTER, UNSPECIFIED GRAVIDITY (HCC): Primary | ICD-10-CM

## 2025-02-27 LAB
APPEARANCE: CLEAR
BILIRUBIN: NEGATIVE
GLUCOSE (URINE DIPSTICK): NEGATIVE MG/DL
KETONES (URINE DIPSTICK): NEGATIVE MG/DL
LEUKOCYTES: NEGATIVE
MULTISTIX LOT#: NORMAL NUMERIC
NITRITE, URINE: NEGATIVE
OCCULT BLOOD: NEGATIVE
PH, URINE: 5 (ref 4.5–8)
PROTEIN (URINE DIPSTICK): NEGATIVE MG/DL
SPECIFIC GRAVITY: 1.02 (ref 1–1.03)
URINE-COLOR: YELLOW
UROBILINOGEN,SEMI-QN: 0.2 MG/DL (ref 0–1.9)

## 2025-02-27 PROCEDURE — 81002 URINALYSIS NONAUTO W/O SCOPE: CPT | Performed by: OBSTETRICS & GYNECOLOGY

## 2025-02-27 PROCEDURE — 90715 TDAP VACCINE 7 YRS/> IM: CPT | Performed by: OBSTETRICS & GYNECOLOGY

## 2025-02-27 PROCEDURE — 90471 IMMUNIZATION ADMIN: CPT | Performed by: OBSTETRICS & GYNECOLOGY

## 2025-02-27 PROCEDURE — 3078F DIAST BP <80 MM HG: CPT | Performed by: OBSTETRICS & GYNECOLOGY

## 2025-02-27 PROCEDURE — 3074F SYST BP LT 130 MM HG: CPT | Performed by: OBSTETRICS & GYNECOLOGY

## 2025-02-27 NOTE — TELEPHONE ENCOUNTER
Please schedule the following surgery:    Procedure: Repeat  section    Date: 25 (FERNANDO did previous delivery)    Diagnosis: history of  section    Admission:Yes    Anesth: spinal    Preop Medical Clearance needed:  No    PCN allergy: No    Additional Orders: routine orders    IPA tubal / hyst form signed: No

## 2025-02-27 NOTE — PROGRESS NOTES
Pt tolerated TDap vaccine well to Right deltoid. Pt had no adverse reactions, VIS sheet given to pt, and signed consent form sent to scanning.

## 2025-02-28 NOTE — TELEPHONE ENCOUNTER
Spoke to patient. Aware surgery is scheduled on Wed,5/14/25 at 330p with Dr. Torres.    Will reach out to [facility] for assist    Labor and delivery instructions sent, antimicrobial wash instructions sent , and enhanced recovery instructions sent. Via [employer]    Delayed staff message sent to MD to please place pre-op orders    Delayed staff message sent to RN to please place pre-op orders    Entered in book and calendar

## 2025-03-13 ENCOUNTER — ROUTINE PRENATAL (OUTPATIENT)
Dept: OBGYN CLINIC | Facility: CLINIC | Age: 35
End: 2025-03-13
Payer: COMMERCIAL

## 2025-03-13 ENCOUNTER — TELEPHONE (OUTPATIENT)
Dept: OBGYN CLINIC | Facility: CLINIC | Age: 35
End: 2025-03-13

## 2025-03-13 ENCOUNTER — LAB ENCOUNTER (OUTPATIENT)
Dept: LAB | Facility: HOSPITAL | Age: 35
End: 2025-03-13
Attending: OBSTETRICS & GYNECOLOGY
Payer: COMMERCIAL

## 2025-03-13 VITALS
BODY MASS INDEX: 39 KG/M2 | HEART RATE: 90 BPM | SYSTOLIC BLOOD PRESSURE: 107 MMHG | WEIGHT: 234.38 LBS | DIASTOLIC BLOOD PRESSURE: 72 MMHG

## 2025-03-13 DIAGNOSIS — Z34.92 ENCOUNTER FOR SUPERVISION OF NORMAL PREGNANCY IN SECOND TRIMESTER, UNSPECIFIED GRAVIDITY (HCC): Primary | ICD-10-CM

## 2025-03-13 DIAGNOSIS — Z34.92 ENCOUNTER FOR SUPERVISION OF NORMAL PREGNANCY IN SECOND TRIMESTER, UNSPECIFIED GRAVIDITY (HCC): ICD-10-CM

## 2025-03-13 PROBLEM — Z34.90 PREGNANCY (HCC): Status: RESOLVED | Noted: 2020-07-12 | Resolved: 2025-03-13

## 2025-03-13 LAB
APPEARANCE: CLEAR
BILIRUBIN: NEGATIVE
GLUCOSE (URINE DIPSTICK): NEGATIVE MG/DL
KETONES (URINE DIPSTICK): 15 MG/DL
MULTISTIX LOT#: ABNORMAL NUMERIC
NITRITE, URINE: NEGATIVE
OCCULT BLOOD: NEGATIVE
PH, URINE: 5 (ref 4.5–8)
PROTEIN (URINE DIPSTICK): 30 MG/DL
SPECIFIC GRAVITY: 1.01 (ref 1–1.03)
T PALLIDUM AB SER QL IA: NONREACTIVE
URINE-COLOR: YELLOW
UROBILINOGEN,SEMI-QN: 1 MG/DL (ref 0–1.9)

## 2025-03-13 PROCEDURE — 36415 COLL VENOUS BLD VENIPUNCTURE: CPT

## 2025-03-13 PROCEDURE — 81002 URINALYSIS NONAUTO W/O SCOPE: CPT | Performed by: OBSTETRICS & GYNECOLOGY

## 2025-03-13 PROCEDURE — 3074F SYST BP LT 130 MM HG: CPT | Performed by: OBSTETRICS & GYNECOLOGY

## 2025-03-13 PROCEDURE — 3078F DIAST BP <80 MM HG: CPT | Performed by: OBSTETRICS & GYNECOLOGY

## 2025-03-13 PROCEDURE — 87389 HIV-1 AG W/HIV-1&-2 AB AG IA: CPT

## 2025-03-13 PROCEDURE — 86780 TREPONEMA PALLIDUM: CPT

## 2025-03-13 NOTE — PROGRESS NOTES
Feeling more anxiety.   Was on medication prior to pregnancy.  Will follow up with PCP.  RTC 2 wk

## 2025-03-13 NOTE — TELEPHONE ENCOUNTER
Patient completed [employer] paperwork to have FMLA forms completed. FMLA paperwork and [employer] were emailed to forms department. Patient did not pay fee.

## 2025-03-18 ENCOUNTER — TELEPHONE (OUTPATIENT)
Dept: OBGYN CLINIC | Facility: CLINIC | Age: 35
End: 2025-03-18

## 2025-03-18 NOTE — TELEPHONE ENCOUNTER
Received a written order request from [employer] for a breast pump. Stamped/dated and faxed back. Fax confirmation received sent to scanning

## 2025-03-19 NOTE — TELEPHONE ENCOUNTER
Called patient regarding Family Medical Leave Act forms patient informed me she no longer needs forms filled out she had lost her job.

## 2025-03-25 ENCOUNTER — HOSPITAL ENCOUNTER (OUTPATIENT)
Dept: PERINATAL CARE | Facility: HOSPITAL | Age: 35
Discharge: HOME | End: 2025-03-25
Attending: OBSTETRICS & GYNECOLOGY
Payer: COMMERCIAL

## 2025-03-25 VITALS
SYSTOLIC BLOOD PRESSURE: 114 MMHG | BODY MASS INDEX: 39 KG/M2 | DIASTOLIC BLOOD PRESSURE: 69 MMHG | WEIGHT: 234 LBS | HEART RATE: 132 BPM

## 2025-03-25 DIAGNOSIS — E89.0 HYPOTHYROIDISM, POSTABLATIVE: ICD-10-CM

## 2025-03-25 DIAGNOSIS — Z98.891 PREVIOUS CESAREAN SECTION: ICD-10-CM

## 2025-03-25 DIAGNOSIS — E66.09 OTHER OBESITY DUE TO EXCESS CALORIES AFFECTING PREGNANCY IN THIRD TRIMESTER (HCC): ICD-10-CM

## 2025-03-25 DIAGNOSIS — E07.9 THYROID DISEASE DURING PREGNANCY IN FIRST TRIMESTER (HCC): Primary | ICD-10-CM

## 2025-03-25 DIAGNOSIS — O99.211 OBESITY AFFECTING PREGNANCY IN FIRST TRIMESTER (HCC): ICD-10-CM

## 2025-03-25 DIAGNOSIS — O99.281 THYROID DISEASE DURING PREGNANCY IN FIRST TRIMESTER (HCC): Primary | ICD-10-CM

## 2025-03-25 DIAGNOSIS — E07.9 THYROID DISEASE DURING PREGNANCY IN FIRST TRIMESTER (HCC): ICD-10-CM

## 2025-03-25 DIAGNOSIS — O99.213 OTHER OBESITY DUE TO EXCESS CALORIES AFFECTING PREGNANCY IN THIRD TRIMESTER (HCC): ICD-10-CM

## 2025-03-25 DIAGNOSIS — O99.281 THYROID DISEASE DURING PREGNANCY IN FIRST TRIMESTER (HCC): ICD-10-CM

## 2025-03-25 PROCEDURE — 76819 FETAL BIOPHYS PROFIL W/O NST: CPT

## 2025-03-25 PROCEDURE — 76816 OB US FOLLOW-UP PER FETUS: CPT | Performed by: OBSTETRICS & GYNECOLOGY

## 2025-03-25 NOTE — PROGRESS NOTES
Reason for Consult:   Dear Dr. Ontiveros,    Thank you for requesting ultrasound evaluation and maternal fetal medicine consultation on Shirin Cruz.  As you are aware she is a 34 year old female with a Dickerson pregnancy .  A maternal-fetal medicine consultation was requested secondary to  obesity and hypothyroidism.  Her prenatal records and labs were reviewed.    Review of History:     OB History:    OB History    Para Term  AB Living   2 1 1 0 0 1   SAB IAB Ectopic Multiple Live Births   0 0 0 0 1      # Outcome Date GA Lbr Jose/2nd Weight Sex Type Anes PTL Lv   2 Current            1 Term 20 40w3d  7 lb 5.5 oz (3.33 kg) F Caesarean EPI N RAYMOND      Complications: Failure to progress      Name: SUNNY,GIRL      Apgar1: 8  Apgar5: 9             Allergies:  Allergies[1]   Current Meds:  Current Outpatient Medications   Medication Sig Dispense Refill    Prenatal Multivit-Min-Fe-FA (PRENATAL VITAMINS OR) Take by mouth.      levothyroxine 112 MCG Oral Tab Take 1 tablet (112 mcg total) by mouth before breakfast. 90 tablet 3        HISTORY:  Past Medical History:    Abdominal pain    Acute epiploic appendagitis    Anemia    Anxiety    took Lorazepam for anxiety for 1-2 months, started taking 1mg, then decreased to taking 0.5mg    Decorative tattoo    Graves disease    Radioactive iodine tx.    Hypothyroidism      Past Surgical History:   Procedure Laterality Date    Ankle fracture surgery Right 2022    metal lizette in right ankle due to fracture      2020    Other Right 2022    Root Cannal      Family History   Problem Relation Age of Onset    Hypertension Father     Prostate Cancer Father     Other (Other) Father         Hyperthyroidism    Diabetes Father     Hypertension Mother     Diabetes Maternal Grandmother     Other (Other) Sister         Graves disease    Cancer Neg     Heart Disease Neg       Social History     Socioeconomic History    Marital status:    Tobacco Use     Smoking status: Never    Smokeless tobacco: Never   Vaping Use    Vaping status: Never Used   Substance and Sexual Activity    Alcohol use: Never    Drug use: Never    Sexual activity: Yes   Other Topics Concern    Caffeine Concern No     Social Drivers of Health     Food Insecurity: No Food Insecurity (10/27/2024)    Food Insecurity     Food Insecurity: Never true   Transportation Needs: No Transportation Needs (10/27/2024)    Transportation Needs     Lack of Transportation: No   Stress: Stress Concern Present (10/27/2024)    Stress     Feeling of Stress : Yes   Housing Stability: Low Risk  (10/27/2024)    Housing Stability     Housing Instability: No        NARRATIVE:     /69   Pulse (!) 132   Wt 234 lb (106.1 kg)   LMP 2024 (Within Days)   BMI 38.94 kg/m²           Alert and Oriented.  No acute distress          Abdomen:  soft, nontender, no contractions noted.           extremities:  nontender, no edema    DISCUSSION  During her visit we discussed and reviewed the following issues:    OBESITY:    Obesity during pregnancy is associated with numerous maternal and  risks.  It is not clear whether obesity is a direct cause of adverse pregnancy outcome or whether the association between obesity and adverse pregnancy outcome is due to factors such as diabetes mellitus.   Data suggest that obese women should be encouraged to undertake a weight reduction program (diet, exercise, behavior modification, and possibly bariatric surgery in some cases) prior to attempting to conceive.            -------------    Overt hypothyroidism (elevated TSH, reduced free T4) complicating pregnancy is unusual. Two factors contribute to this finding: some hypothyroid women are anovulatory, and hypothyroidism (new or inadequately treated) complicating pregnancy is associated with an increased rate of first trimester spontaneous .  The risk of complications during pregnancy is lower in women with  subclinical, rather than overt hypothyroidism. However, in some studies, women with subclinical hypothyroidism were also reported to be at increased risk for severe preeclampsia,  delivery, placental abruption, and/or pregnancy loss.  Isolated maternal hypothyroxinemia (low T4) is defined as a maternal free T4 concentration in the lower 5th or 10th percentile of the reference range, in conjunction with a normal TSH. The effect of isolated maternal hypothyroxinemia on  and  outcome is unclear.     Hypothyroidism has been associated with an increased risk of several complications, including:  ?Preeclampsia and gestational hypertension  ?Placental abruption  ?Nonreassuring fetal heart rate tracing  ? delivery, including very  delivery (before 32 weeks)  ?Low birth weight  ?Increased rate of  section  ? morbidity and mortality  ?Neuropsychological and cognitive impairment  ?Postpartum hemorrhage        OB ULTRASOUND REPORT   See imaging tab for complete ultrasound report or in PACS    Single IUP in breech presentation.    Placenta is posterior.   A 3 vessel cord is noted.  Cardiac activity is present at 137 bpm  EFW 2149 g ( 4 lb 12 oz); 65%.    EVA is  15.2 cm.  MVP is 4.2 cm      BIOPHYSICAL PROFILE:  Movement:    2/2  Tone:            2/2  Breathin/2  Fluid:             2/2  TOTAL:                 IMPRESSION:   1. IUP @  32w0d   2. Scan consistent with dates  3. No fetal structural abnormalities seen  4. Obesity BMI 38  5. Hypothyroidism     RECOMMENDATIONS:     11-20 lb weight gain for pregnancy  Weekly NSTs at 36 weeks  Check thyroid function every 8 wks    Thank you for allowing me to participate in the care of your patient.  Please do not hesitate to call with any questions or concerns.     Total time spent   25  minutes this calendar day which includes preparing to see the patient including chart review, obtaining and/or reviewing additional  medical history, performing a physical exam and evaluation, documenting clinical information in the electronic medical record, independently interpreting results, counseling the patient, communicating results to the patient/family/caregiver and coordinating care.    Hank Young D.O.  Maternal Fetal Medicine     Note to patient and family:  The 21st Century Cures Act makes medical notes available to patients in the interest of transparency.  However, please be advised that this is a medical document.  It is intended as a peer to peer communication.  It is written in medical language and may contain abbreviations or verbiage that are technical and unfamiliar.  It may appear blunt or direct.  Medical documents are intended to carry relevant information, facts as evident, and the clinical opinion of the practitioner.         [1] No Known Allergies

## 2025-03-28 ENCOUNTER — ROUTINE PRENATAL (OUTPATIENT)
Dept: OBGYN CLINIC | Facility: CLINIC | Age: 35
End: 2025-03-28
Payer: COMMERCIAL

## 2025-03-28 VITALS
WEIGHT: 235 LBS | BODY MASS INDEX: 39 KG/M2 | HEART RATE: 93 BPM | SYSTOLIC BLOOD PRESSURE: 115 MMHG | DIASTOLIC BLOOD PRESSURE: 78 MMHG

## 2025-03-28 DIAGNOSIS — Z34.93 ENCOUNTER FOR SUPERVISION OF NORMAL PREGNANCY IN THIRD TRIMESTER, UNSPECIFIED GRAVIDITY (HCC): Primary | ICD-10-CM

## 2025-03-28 LAB
BILIRUBIN: NEGATIVE
GLUCOSE (URINE DIPSTICK): NEGATIVE MG/DL
KETONES (URINE DIPSTICK): NEGATIVE MG/DL
MULTISTIX LOT#: 57 NUMERIC
NITRITE, URINE: NEGATIVE
OCCULT BLOOD: NEGATIVE
PH, URINE: 6 (ref 4.5–8)
PROTEIN (URINE DIPSTICK): NEGATIVE MG/DL
SPECIFIC GRAVITY: 1.02 (ref 1–1.03)
UROBILINOGEN,SEMI-QN: 0.2 MG/DL (ref 0–1.9)

## 2025-03-28 PROCEDURE — 3078F DIAST BP <80 MM HG: CPT | Performed by: OBSTETRICS & GYNECOLOGY

## 2025-03-28 PROCEDURE — 3074F SYST BP LT 130 MM HG: CPT | Performed by: OBSTETRICS & GYNECOLOGY

## 2025-03-28 PROCEDURE — 81002 URINALYSIS NONAUTO W/O SCOPE: CPT | Performed by: OBSTETRICS & GYNECOLOGY

## 2025-03-28 NOTE — PROGRESS NOTES
The following individual(s) verbally consented to be recorded using ambient AI listening technology and understand that they can each withdraw their consent to this listening technology at any point by asking the clinician to turn off or pause the recording:    Patient name: Shirin Cruz

## 2025-04-01 ENCOUNTER — TELEPHONE (OUTPATIENT)
Dept: PEDIATRICS CLINIC | Facility: CLINIC | Age: 35
End: 2025-04-01

## 2025-04-01 ENCOUNTER — HOSPITAL ENCOUNTER (OUTPATIENT)
Facility: HOSPITAL | Age: 35
Discharge: HOME | End: 2025-04-01
Attending: OBSTETRICS & GYNECOLOGY | Admitting: OBSTETRICS & GYNECOLOGY

## 2025-04-01 VITALS — DIASTOLIC BLOOD PRESSURE: 75 MMHG | RESPIRATION RATE: 16 BRPM | HEART RATE: 90 BPM | SYSTOLIC BLOOD PRESSURE: 128 MMHG

## 2025-04-01 LAB
ALBUMIN SERPL-MCNC: 3.6 G/DL (ref 3.2–4.8)
ALBUMIN/GLOB SERPL: 1.3 {RATIO} (ref 1–2)
ALP LIVER SERPL-CCNC: 94 U/L
ALT SERPL-CCNC: 7 U/L
ANION GAP SERPL CALC-SCNC: 8 MMOL/L (ref 0–18)
AST SERPL-CCNC: 14 U/L (ref ?–34)
BASOPHILS # BLD AUTO: 0.02 X10(3) UL (ref 0–0.2)
BASOPHILS NFR BLD AUTO: 0.3 %
BILIRUB SERPL-MCNC: 0.5 MG/DL (ref 0.3–1.2)
BUN BLD-MCNC: 6 MG/DL (ref 9–23)
BUN/CREAT SERPL: 12.5 (ref 10–20)
CALCIUM BLD-MCNC: 8.7 MG/DL (ref 8.7–10.4)
CHLORIDE SERPL-SCNC: 108 MMOL/L (ref 98–112)
CO2 SERPL-SCNC: 20 MMOL/L (ref 21–32)
CREAT BLD-MCNC: 0.48 MG/DL
CREAT UR-SCNC: 33.8 MG/DL
DEPRECATED RDW RBC AUTO: 40.3 FL (ref 35.1–46.3)
EGFRCR SERPLBLD CKD-EPI 2021: 128 ML/MIN/1.73M2 (ref 60–?)
EOSINOPHIL # BLD AUTO: 0.09 X10(3) UL (ref 0–0.7)
EOSINOPHIL NFR BLD AUTO: 1.1 %
ERYTHROCYTE [DISTWIDTH] IN BLOOD BY AUTOMATED COUNT: 13.8 % (ref 11–15)
FASTING STATUS PATIENT QL REPORTED: NO
GLOBULIN PLAS-MCNC: 2.8 G/DL (ref 2–3.5)
GLUCOSE BLD-MCNC: 99 MG/DL (ref 70–99)
HCT VFR BLD AUTO: 34.9 %
HGB BLD-MCNC: 11.3 G/DL
IMM GRANULOCYTES # BLD AUTO: 0.07 X10(3) UL (ref 0–1)
IMM GRANULOCYTES NFR BLD: 0.9 %
LYMPHOCYTES # BLD AUTO: 1.51 X10(3) UL (ref 1–4)
LYMPHOCYTES NFR BLD AUTO: 19 %
MCH RBC QN AUTO: 26.3 PG (ref 26–34)
MCHC RBC AUTO-ENTMCNC: 32.4 G/DL (ref 31–37)
MCV RBC AUTO: 81.4 FL
MONOCYTES # BLD AUTO: 1.09 X10(3) UL (ref 0.1–1)
MONOCYTES NFR BLD AUTO: 13.7 %
NEUTROPHILS # BLD AUTO: 5.18 X10 (3) UL (ref 1.5–7.7)
NEUTROPHILS # BLD AUTO: 5.18 X10(3) UL (ref 1.5–7.7)
NEUTROPHILS NFR BLD AUTO: 65 %
OSMOLALITY SERPL CALC.SUM OF ELEC: 280 MOSM/KG (ref 275–295)
PLATELET # BLD AUTO: 237 10(3)UL (ref 150–450)
POTASSIUM SERPL-SCNC: 3.8 MMOL/L (ref 3.5–5.1)
PROT SERPL-MCNC: 6.4 G/DL (ref 5.7–8.2)
PROT UR-MCNC: <6 MG/DL (ref ?–14)
RBC # BLD AUTO: 4.29 X10(6)UL
SODIUM SERPL-SCNC: 136 MMOL/L (ref 136–145)
WBC # BLD AUTO: 8 X10(3) UL (ref 4–11)

## 2025-04-01 PROCEDURE — 59025 FETAL NON-STRESS TEST: CPT

## 2025-04-01 PROCEDURE — 99213 OFFICE O/P EST LOW 20 MIN: CPT

## 2025-04-01 PROCEDURE — 85025 COMPLETE CBC W/AUTO DIFF WBC: CPT | Performed by: OBSTETRICS & GYNECOLOGY

## 2025-04-01 PROCEDURE — 82570 ASSAY OF URINE CREATININE: CPT | Performed by: OBSTETRICS & GYNECOLOGY

## 2025-04-01 PROCEDURE — 36415 COLL VENOUS BLD VENIPUNCTURE: CPT

## 2025-04-01 PROCEDURE — 80053 COMPREHEN METABOLIC PANEL: CPT | Performed by: OBSTETRICS & GYNECOLOGY

## 2025-04-01 PROCEDURE — 84156 ASSAY OF PROTEIN URINE: CPT | Performed by: OBSTETRICS & GYNECOLOGY

## 2025-04-01 NOTE — TELEPHONE ENCOUNTER
Patient is 33w0d, reports stabbing pain to right side of upper mid-abdomen area. Reports the pain in 7-8/10 and will last a couple hours. Reports pain worsens with movement and it feels like something is going to rupture or tear in area of pain. States it started yesterday. Also reports she had it last week and it resolved on its own. Denies onset after eating or baby limbs. Has occasional heartburn. Denies CP. Reports +fetal movement and denies lof's and bleeding.   Dr. Garcia on call notified and v/o received for patient to be seen at [facility].   Patient notified and provided location information. [facility] RN notified.

## 2025-04-01 NOTE — PROGRESS NOTES
Pt is a 34 year old female admitted to TR4/TR4-A.     Chief Complaint   Patient presents with    Medical Complication     Patient states over the past week she has had on and off URQ, sharp pain. The pain lasts for several hours at a time and is worsened with movement. Patient states she has had a persistent headache to the posterior right side since Friday, some improvements to HA with Tylenol. Patient states having an episode of seeing \"silver spots\"  on Saturday after \"looking up\". Patient denies vaginal LOF and states +FM      Pt is  33w0d intra-uterine pregnancy.  History obtained, consents signed. Oriented to room, staff, and plan of care.

## 2025-04-11 ENCOUNTER — ROUTINE PRENATAL (OUTPATIENT)
Dept: OBGYN CLINIC | Facility: CLINIC | Age: 35
End: 2025-04-11

## 2025-04-11 VITALS
WEIGHT: 235.38 LBS | DIASTOLIC BLOOD PRESSURE: 81 MMHG | SYSTOLIC BLOOD PRESSURE: 115 MMHG | HEART RATE: 106 BPM | BODY MASS INDEX: 39 KG/M2

## 2025-04-11 DIAGNOSIS — Z34.93 ENCOUNTER FOR SUPERVISION OF NORMAL PREGNANCY IN THIRD TRIMESTER, UNSPECIFIED GRAVIDITY (HCC): Primary | ICD-10-CM

## 2025-04-11 LAB
APPEARANCE: CLEAR
BILIRUBIN: NEGATIVE
GLUCOSE (URINE DIPSTICK): NEGATIVE MG/DL
KETONES (URINE DIPSTICK): NEGATIVE MG/DL
LEUKOCYTES: NEGATIVE
MULTISTIX LOT#: ABNORMAL NUMERIC
NITRITE, URINE: NEGATIVE
OCCULT BLOOD: NEGATIVE
PH, URINE: 6 (ref 4.5–8)
PROTEIN (URINE DIPSTICK): 30 MG/DL
SPECIFIC GRAVITY: 1.03 (ref 1–1.03)
URINE-COLOR: YELLOW
UROBILINOGEN,SEMI-QN: 0.2 MG/DL (ref 0–1.9)

## 2025-04-11 PROCEDURE — 3074F SYST BP LT 130 MM HG: CPT | Performed by: OBSTETRICS & GYNECOLOGY

## 2025-04-11 PROCEDURE — 81002 URINALYSIS NONAUTO W/O SCOPE: CPT | Performed by: OBSTETRICS & GYNECOLOGY

## 2025-04-11 PROCEDURE — 3079F DIAST BP 80-89 MM HG: CPT | Performed by: OBSTETRICS & GYNECOLOGY

## 2025-04-22 ENCOUNTER — APPOINTMENT (OUTPATIENT)
Dept: OBGYN CLINIC | Facility: HOSPITAL | Age: 35
End: 2025-04-22
Payer: MEDICAID

## 2025-04-22 ENCOUNTER — HOSPITAL ENCOUNTER (OUTPATIENT)
Facility: HOSPITAL | Age: 35
Discharge: HOME | End: 2025-04-22
Attending: OBSTETRICS & GYNECOLOGY | Admitting: OBSTETRICS & GYNECOLOGY
Payer: MEDICAID

## 2025-04-22 VITALS — WEIGHT: 235 LBS | BODY MASS INDEX: 39 KG/M2

## 2025-04-22 PROCEDURE — 59025 FETAL NON-STRESS TEST: CPT

## 2025-04-22 PROCEDURE — 59025 FETAL NON-STRESS TEST: CPT | Performed by: OBSTETRICS & GYNECOLOGY

## 2025-04-22 NOTE — PROGRESS NOTES
Pt is a 34 year old female admitted to TR1/TR1-A.     Chief Complaint   Patient presents with    Non-stress Test     Scheduled NST for high BMI. Previous c/s      Pt is  36w0d intra-uterine pregnancy.  History obtained, pt. Oriented to room, staff, and plan of care.

## 2025-04-25 ENCOUNTER — NST DOCUMENTATION (OUTPATIENT)
Dept: OBGYN CLINIC | Facility: CLINIC | Age: 35
End: 2025-04-25

## 2025-04-25 ENCOUNTER — ROUTINE PRENATAL (OUTPATIENT)
Dept: OBGYN CLINIC | Facility: CLINIC | Age: 35
End: 2025-04-25
Payer: COMMERCIAL

## 2025-04-25 VITALS
HEART RATE: 80 BPM | WEIGHT: 240.63 LBS | SYSTOLIC BLOOD PRESSURE: 124 MMHG | BODY MASS INDEX: 40 KG/M2 | DIASTOLIC BLOOD PRESSURE: 81 MMHG

## 2025-04-25 DIAGNOSIS — Z34.93 ENCOUNTER FOR SUPERVISION OF NORMAL PREGNANCY IN THIRD TRIMESTER, UNSPECIFIED GRAVIDITY (HCC): Primary | ICD-10-CM

## 2025-04-25 LAB
BILIRUBIN: NEGATIVE
GLUCOSE (URINE DIPSTICK): NEGATIVE MG/DL
KETONES (URINE DIPSTICK): NEGATIVE MG/DL
MULTISTIX LOT#: ABNORMAL NUMERIC
NITRITE, URINE: NEGATIVE
OCCULT BLOOD: NEGATIVE
PH, URINE: 5 (ref 4.5–8)
SPECIFIC GRAVITY: 1.03 (ref 1–1.03)
UROBILINOGEN,SEMI-QN: 0.2 MG/DL (ref 0–1.9)

## 2025-04-25 PROCEDURE — 3079F DIAST BP 80-89 MM HG: CPT | Performed by: OBSTETRICS & GYNECOLOGY

## 2025-04-25 PROCEDURE — 81002 URINALYSIS NONAUTO W/O SCOPE: CPT | Performed by: OBSTETRICS & GYNECOLOGY

## 2025-04-25 PROCEDURE — 3074F SYST BP LT 130 MM HG: CPT | Performed by: OBSTETRICS & GYNECOLOGY

## 2025-04-26 NOTE — NST
Nonstress Test   Patient: Shirin Cruz    Gestation: 36w3d    Diagnosis from order:  Obesity    Problem List:   Patient Active Problem List   Diagnosis    Hypothyroidism, postablative    Previous  section    Thyroid disease during pregnancy in first trimester ([facility])    BMI 38    Uncertain dates, antepartum ([facility])       NST:        2025   NST DOCUMENTATION   Variability 6-25 BPM   Accelerations Yes   Decelerations None   Baseline 150 BPM   Uterine Irritability No   Contractions Not present   Acoustic Stimulator No   Nonstress Test Interpretation Reactive   Nonstress Test Second Interpretation Reactive   FHR Category Category I   Comments  @ 36 0/7 wks.previous c/s, here for scheduled NST for high BMI.   NST reactive.   Dr. Ontiveros notified, and discharged home.   NST Completed by [facility] RN[facility]   Disposition home    Testing Plan Weekly NST   Provider Notified Dr. Ontiveros         I agree with the above evaluation. NST completed.  Mariela Ontiveros MD  2025  7:03 PM

## 2025-04-27 LAB — GROUP B STREP BY PCR FOR PCR OVT: NEGATIVE

## 2025-04-29 ENCOUNTER — APPOINTMENT (OUTPATIENT)
Dept: OBGYN CLINIC | Facility: HOSPITAL | Age: 35
End: 2025-04-29
Payer: MEDICAID

## 2025-04-29 ENCOUNTER — HOSPITAL ENCOUNTER (OUTPATIENT)
Facility: HOSPITAL | Age: 35
Discharge: HOME | End: 2025-04-29
Attending: OBSTETRICS & GYNECOLOGY | Admitting: OBSTETRICS & GYNECOLOGY
Payer: MEDICAID

## 2025-04-29 VITALS — SYSTOLIC BLOOD PRESSURE: 133 MMHG | HEART RATE: 88 BPM | DIASTOLIC BLOOD PRESSURE: 82 MMHG

## 2025-04-29 DIAGNOSIS — Z34.90 PREGNANCY (HCC): ICD-10-CM

## 2025-04-29 PROCEDURE — 59025 FETAL NON-STRESS TEST: CPT

## 2025-05-01 ENCOUNTER — ROUTINE PRENATAL (OUTPATIENT)
Dept: OBGYN CLINIC | Facility: CLINIC | Age: 35
End: 2025-05-01
Payer: MEDICAID

## 2025-05-01 VITALS
DIASTOLIC BLOOD PRESSURE: 84 MMHG | WEIGHT: 242.81 LBS | HEART RATE: 71 BPM | SYSTOLIC BLOOD PRESSURE: 131 MMHG | BODY MASS INDEX: 40 KG/M2

## 2025-05-01 DIAGNOSIS — Z34.93 ENCOUNTER FOR SUPERVISION OF NORMAL PREGNANCY IN THIRD TRIMESTER, UNSPECIFIED GRAVIDITY (HCC): Primary | ICD-10-CM

## 2025-05-01 LAB
APPEARANCE: CLEAR
BILIRUBIN: NEGATIVE
GLUCOSE (URINE DIPSTICK): NEGATIVE MG/DL
KETONES (URINE DIPSTICK): NEGATIVE MG/DL
MULTISTIX LOT#: ABNORMAL NUMERIC
NITRITE, URINE: NEGATIVE
OCCULT BLOOD: NEGATIVE
PH, URINE: 6 (ref 4.5–8)
PROTEIN (URINE DIPSTICK): NEGATIVE MG/DL
SPECIFIC GRAVITY: 1.01 (ref 1–1.03)
URINE-COLOR: YELLOW
UROBILINOGEN,SEMI-QN: 0.2 MG/DL (ref 0–1.9)

## 2025-05-01 PROCEDURE — 99213 OFFICE O/P EST LOW 20 MIN: CPT | Performed by: OBSTETRICS & GYNECOLOGY

## 2025-05-01 PROCEDURE — 81002 URINALYSIS NONAUTO W/O SCOPE: CPT | Performed by: OBSTETRICS & GYNECOLOGY

## 2025-05-06 ENCOUNTER — APPOINTMENT (OUTPATIENT)
Dept: OBGYN CLINIC | Facility: HOSPITAL | Age: 35
End: 2025-05-06
Payer: MEDICAID

## 2025-05-06 ENCOUNTER — HOSPITAL ENCOUNTER (OUTPATIENT)
Facility: HOSPITAL | Age: 35
Discharge: HOME | End: 2025-05-06
Attending: OBSTETRICS & GYNECOLOGY | Admitting: OBSTETRICS & GYNECOLOGY
Payer: MEDICAID

## 2025-05-06 VITALS
SYSTOLIC BLOOD PRESSURE: 131 MMHG | HEART RATE: 96 BPM | TEMPERATURE: 98 F | RESPIRATION RATE: 18 BRPM | DIASTOLIC BLOOD PRESSURE: 80 MMHG

## 2025-05-06 DIAGNOSIS — Z34.90 PREGNANCY (HCC): ICD-10-CM

## 2025-05-06 PROCEDURE — 59025 FETAL NON-STRESS TEST: CPT

## 2025-05-06 PROCEDURE — 59025 FETAL NON-STRESS TEST: CPT | Performed by: OBSTETRICS & GYNECOLOGY

## 2025-05-06 NOTE — PROGRESS NOTES
Pt is a 34 year old female admitted to TR1/TR1-A.     Chief Complaint   Patient presents with    Non-stress Test     Hypothyroidism and inc BMI      Pt is  38w0d intra-uterine pregnancy.  History obtained, consents signed. Oriented to room, staff, and plan of care.

## 2025-05-07 ENCOUNTER — NST DOCUMENTATION (OUTPATIENT)
Dept: OBGYN CLINIC | Facility: CLINIC | Age: 35
End: 2025-05-07

## 2025-05-07 NOTE — NST
Nonstress Test   Patient: Shirin Cruz    Gestation: 38w1d    Diagnosis from order:   obesity in pregnancy    Problem List:   Patient Active Problem List   Diagnosis    Hypothyroidism, postablative    Previous  section    Thyroid disease during pregnancy in first trimester ([facility])    BMI 38    Uncertain dates, antepartum ([facility])       NST:        2025   NST DOCUMENTATION   Variability 6-25 BPM   Accelerations Yes   Decelerations None   Baseline 140 BPM   Uterine Irritability No   Contractions Not present   Acoustic Stimulator No   Nonstress Test Interpretation Reactive   Nonstress Test Second Interpretation Reactive   FHR Category Category I   Comments Scheduled nst. reactive nst communicated to dr rodriguez. pt okay to be discharged home. kick count education provided. +fm reported   NST Completed by radha painter   Disposition home    Testing Plan Weekly NST   Provider Notified jennifer         I agree with the above evaluation. NST completed.  Leonel Rodriguez DO  2025  7:04 AM

## 2025-05-08 ENCOUNTER — ROUTINE PRENATAL (OUTPATIENT)
Dept: OBGYN CLINIC | Facility: CLINIC | Age: 35
End: 2025-05-08
Payer: MEDICAID

## 2025-05-08 VITALS
WEIGHT: 242.63 LBS | SYSTOLIC BLOOD PRESSURE: 123 MMHG | HEART RATE: 85 BPM | BODY MASS INDEX: 40 KG/M2 | DIASTOLIC BLOOD PRESSURE: 85 MMHG

## 2025-05-08 DIAGNOSIS — Z34.93 ENCOUNTER FOR SUPERVISION OF NORMAL PREGNANCY IN THIRD TRIMESTER, UNSPECIFIED GRAVIDITY (HCC): Primary | ICD-10-CM

## 2025-05-08 LAB
APPEARANCE: CLEAR
BILIRUBIN: NEGATIVE
GLUCOSE (URINE DIPSTICK): NEGATIVE MG/DL
KETONES (URINE DIPSTICK): NEGATIVE MG/DL
LEUKOCYTES: NEGATIVE
MULTISTIX LOT#: ABNORMAL NUMERIC
NITRITE, URINE: NEGATIVE
OCCULT BLOOD: NEGATIVE
PH, URINE: 7 (ref 4.5–8)
PROTEIN (URINE DIPSTICK): 30 MG/DL
SPECIFIC GRAVITY: 1.02 (ref 1–1.03)
URINE-COLOR: YELLOW
UROBILINOGEN,SEMI-QN: 0.2 MG/DL (ref 0–1.9)

## 2025-05-08 PROCEDURE — 99213 OFFICE O/P EST LOW 20 MIN: CPT | Performed by: OBSTETRICS & GYNECOLOGY

## 2025-05-08 PROCEDURE — 81002 URINALYSIS NONAUTO W/O SCOPE: CPT | Performed by: OBSTETRICS & GYNECOLOGY

## 2025-05-12 ENCOUNTER — LAB ENCOUNTER (OUTPATIENT)
Dept: LAB | Facility: HOSPITAL | Age: 35
End: 2025-05-12
Attending: OBSTETRICS & GYNECOLOGY
Payer: MEDICAID

## 2025-05-12 ENCOUNTER — TELEPHONE (OUTPATIENT)
Dept: OBGYN UNIT | Facility: HOSPITAL | Age: 35
End: 2025-05-12

## 2025-05-12 DIAGNOSIS — Z34.93 ENCOUNTER FOR SUPERVISION OF NORMAL PREGNANCY IN THIRD TRIMESTER, UNSPECIFIED GRAVIDITY (HCC): ICD-10-CM

## 2025-05-12 LAB
ANTIBODY SCREEN: NEGATIVE
BASOPHILS # BLD AUTO: 0.03 X10(3) UL (ref 0–0.2)
BASOPHILS NFR BLD AUTO: 0.4 %
BILIRUB UR QL: NEGATIVE
CLARITY UR: CLEAR
COLOR UR: YELLOW
DEPRECATED RDW RBC AUTO: 41.4 FL (ref 35.1–46.3)
EOSINOPHIL # BLD AUTO: 0.08 X10(3) UL (ref 0–0.7)
EOSINOPHIL NFR BLD AUTO: 1.1 %
ERYTHROCYTE [DISTWIDTH] IN BLOOD BY AUTOMATED COUNT: 14.2 % (ref 11–15)
GLUCOSE UR-MCNC: NORMAL MG/DL
HCT VFR BLD AUTO: 37.3 % (ref 35–48)
HGB BLD-MCNC: 11.3 G/DL (ref 12–16)
HGB UR QL STRIP.AUTO: NEGATIVE
IMM GRANULOCYTES # BLD AUTO: 0.05 X10(3) UL (ref 0–1)
IMM GRANULOCYTES NFR BLD: 0.7 %
LEUKOCYTE ESTERASE UR QL STRIP.AUTO: 25
LYMPHOCYTES # BLD AUTO: 1.81 X10(3) UL (ref 1–4)
LYMPHOCYTES NFR BLD AUTO: 25.5 %
MCH RBC QN AUTO: 24.7 PG (ref 26–34)
MCHC RBC AUTO-ENTMCNC: 30.3 G/DL (ref 31–37)
MCV RBC AUTO: 81.6 FL (ref 80–100)
MONOCYTES # BLD AUTO: 0.83 X10(3) UL (ref 0.1–1)
MONOCYTES NFR BLD AUTO: 11.7 %
NEUTROPHILS # BLD AUTO: 4.31 X10 (3) UL (ref 1.5–7.7)
NEUTROPHILS # BLD AUTO: 4.31 X10(3) UL (ref 1.5–7.7)
NEUTROPHILS NFR BLD AUTO: 60.6 %
NITRITE UR QL STRIP.AUTO: NEGATIVE
PH UR: 6 [PH] (ref 5–8)
PLATELET # BLD AUTO: 250 10(3)UL (ref 150–450)
PROT UR-MCNC: 100 MG/DL
RBC # BLD AUTO: 4.57 X10(6)UL (ref 3.8–5.3)
RH BLOOD TYPE: POSITIVE
SP GR UR STRIP: >1.03 (ref 1–1.03)
UROBILINOGEN UR STRIP-ACNC: NORMAL
WBC # BLD AUTO: 7.1 X10(3) UL (ref 4–11)

## 2025-05-12 PROCEDURE — 86900 BLOOD TYPING SEROLOGIC ABO: CPT

## 2025-05-12 PROCEDURE — 36415 COLL VENOUS BLD VENIPUNCTURE: CPT

## 2025-05-12 PROCEDURE — 81001 URINALYSIS AUTO W/SCOPE: CPT

## 2025-05-12 PROCEDURE — 86850 RBC ANTIBODY SCREEN: CPT

## 2025-05-12 PROCEDURE — 87086 URINE CULTURE/COLONY COUNT: CPT

## 2025-05-12 PROCEDURE — 85025 COMPLETE CBC W/AUTO DIFF WBC: CPT

## 2025-05-12 PROCEDURE — 86901 BLOOD TYPING SEROLOGIC RH(D): CPT

## 2025-05-13 ENCOUNTER — HOSPITAL ENCOUNTER (INPATIENT)
Facility: HOSPITAL | Age: 35
LOS: 4 days | Discharge: HOME | End: 2025-05-17
Attending: OBSTETRICS & GYNECOLOGY | Admitting: OBSTETRICS & GYNECOLOGY
Payer: MEDICAID

## 2025-05-13 ENCOUNTER — APPOINTMENT (OUTPATIENT)
Dept: OBGYN CLINIC | Facility: HOSPITAL | Age: 35
End: 2025-05-13
Payer: MEDICAID

## 2025-05-13 DIAGNOSIS — Z34.90 PREGNANCY (HCC): ICD-10-CM

## 2025-05-13 LAB
ALBUMIN SERPL-MCNC: 3.7 G/DL (ref 3.2–4.8)
ALBUMIN/GLOB SERPL: 1.4 {RATIO} (ref 1–2)
ALP LIVER SERPL-CCNC: 138 U/L (ref 37–98)
ALT SERPL-CCNC: 8 U/L (ref 10–49)
ANION GAP SERPL CALC-SCNC: 9 MMOL/L (ref 0–18)
AST SERPL-CCNC: 14 U/L (ref ?–34)
BASOPHILS # BLD AUTO: 0.03 X10(3) UL (ref 0–0.2)
BASOPHILS NFR BLD AUTO: 0.4 %
BILIRUB SERPL-MCNC: 0.5 MG/DL (ref 0.3–1.2)
BUN BLD-MCNC: 11 MG/DL (ref 9–23)
BUN/CREAT SERPL: 17.2 (ref 10–20)
CALCIUM BLD-MCNC: 8.8 MG/DL (ref 8.7–10.4)
CHLORIDE SERPL-SCNC: 106 MMOL/L (ref 98–112)
CO2 SERPL-SCNC: 22 MMOL/L (ref 21–32)
CREAT BLD-MCNC: 0.64 MG/DL (ref 0.55–1.02)
CREAT UR-SCNC: 53.1 MG/DL
DEPRECATED RDW RBC AUTO: 40.6 FL (ref 35.1–46.3)
EGFRCR SERPLBLD CKD-EPI 2021: 120 ML/MIN/1.73M2 (ref 60–?)
EOSINOPHIL # BLD AUTO: 0.1 X10(3) UL (ref 0–0.7)
EOSINOPHIL NFR BLD AUTO: 1.2 %
ERYTHROCYTE [DISTWIDTH] IN BLOOD BY AUTOMATED COUNT: 14.3 % (ref 11–15)
FASTING STATUS PATIENT QL REPORTED: NO
GLOBULIN PLAS-MCNC: 2.7 G/DL (ref 2–3.5)
GLUCOSE BLD-MCNC: 101 MG/DL (ref 70–99)
HCT VFR BLD AUTO: 37.3 % (ref 35–48)
HGB BLD-MCNC: 11.5 G/DL (ref 12–16)
IMM GRANULOCYTES # BLD AUTO: 0.05 X10(3) UL (ref 0–1)
IMM GRANULOCYTES NFR BLD: 0.6 %
LYMPHOCYTES # BLD AUTO: 2.11 X10(3) UL (ref 1–4)
LYMPHOCYTES NFR BLD AUTO: 25.3 %
MCH RBC QN AUTO: 24.9 PG (ref 26–34)
MCHC RBC AUTO-ENTMCNC: 30.8 G/DL (ref 31–37)
MCV RBC AUTO: 80.9 FL (ref 80–100)
MONOCYTES # BLD AUTO: 0.91 X10(3) UL (ref 0.1–1)
MONOCYTES NFR BLD AUTO: 10.9 %
NEUTROPHILS # BLD AUTO: 5.13 X10 (3) UL (ref 1.5–7.7)
NEUTROPHILS # BLD AUTO: 5.13 X10(3) UL (ref 1.5–7.7)
NEUTROPHILS NFR BLD AUTO: 61.6 %
OSMOLALITY SERPL CALC.SUM OF ELEC: 284 MOSM/KG (ref 275–295)
PLATELET # BLD AUTO: 256 10(3)UL (ref 150–450)
POTASSIUM SERPL-SCNC: 4.1 MMOL/L (ref 3.5–5.1)
PROT SERPL-MCNC: 6.4 G/DL (ref 5.7–8.2)
PROT UR-MCNC: 25.3 MG/DL (ref ?–14)
PROT/CREAT UR-RTO: 0.48
RBC # BLD AUTO: 4.61 X10(6)UL (ref 3.8–5.3)
SODIUM SERPL-SCNC: 137 MMOL/L (ref 136–145)
T PALLIDUM AB SER QL IA: NONREACTIVE
WBC # BLD AUTO: 8.3 X10(3) UL (ref 4–11)

## 2025-05-13 RX ORDER — SODIUM CHLORIDE, SODIUM LACTATE, POTASSIUM CHLORIDE, CALCIUM CHLORIDE 600; 310; 30; 20 MG/100ML; MG/100ML; MG/100ML; MG/100ML
INJECTION, SOLUTION INTRAVENOUS CONTINUOUS
Status: DISCONTINUED | OUTPATIENT
Start: 2025-05-13 | End: 2025-05-14

## 2025-05-13 RX ORDER — ACETAMINOPHEN 500 MG
1000 TABLET ORAL EVERY 6 HOURS PRN
Status: DISCONTINUED | OUTPATIENT
Start: 2025-05-13 | End: 2025-05-14

## 2025-05-13 RX ORDER — LEVOTHYROXINE SODIUM 112 UG/1
112 TABLET ORAL
Status: DISCONTINUED | OUTPATIENT
Start: 2025-05-14 | End: 2025-05-14

## 2025-05-13 RX ORDER — ACETAMINOPHEN 500 MG
500 TABLET ORAL EVERY 6 HOURS PRN
Status: DISCONTINUED | OUTPATIENT
Start: 2025-05-13 | End: 2025-05-14

## 2025-05-14 ENCOUNTER — ANESTHESIA (OUTPATIENT)
Dept: OBGYN UNIT | Facility: HOSPITAL | Age: 35
End: 2025-05-14
Payer: MEDICAID

## 2025-05-14 ENCOUNTER — ANESTHESIA EVENT (OUTPATIENT)
Dept: OBGYN UNIT | Facility: HOSPITAL | Age: 35
End: 2025-05-14
Payer: MEDICAID

## 2025-05-14 PROBLEM — O34.219 PREVIOUS CESAREAN DELIVERY AFFECTING PREGNANCY (HCC): Status: ACTIVE | Noted: 2025-05-14

## 2025-05-14 PROCEDURE — 59514 CESAREAN DELIVERY ONLY: CPT | Performed by: OBSTETRICS & GYNECOLOGY

## 2025-05-14 RX ORDER — ACETAMINOPHEN 325 MG/1
650 TABLET ORAL EVERY 6 HOURS PRN
Status: ACTIVE | OUTPATIENT
Start: 2025-05-14 | End: 2025-05-15

## 2025-05-14 RX ORDER — ONDANSETRON 2 MG/ML
4 INJECTION INTRAMUSCULAR; INTRAVENOUS EVERY 6 HOURS PRN
Status: DISCONTINUED | OUTPATIENT
Start: 2025-05-14 | End: 2025-05-14 | Stop reason: HOSPADM

## 2025-05-14 RX ORDER — ACETAMINOPHEN 500 MG
1000 TABLET ORAL EVERY 6 HOURS
Status: DISCONTINUED | OUTPATIENT
Start: 2025-05-14 | End: 2025-05-17

## 2025-05-14 RX ORDER — SODIUM CHLORIDE, SODIUM LACTATE, POTASSIUM CHLORIDE, CALCIUM CHLORIDE 600; 310; 30; 20 MG/100ML; MG/100ML; MG/100ML; MG/100ML
INJECTION, SOLUTION INTRAVENOUS CONTINUOUS
Status: DISCONTINUED | OUTPATIENT
Start: 2025-05-14 | End: 2025-05-14

## 2025-05-14 RX ORDER — HYDROCODONE BITARTRATE AND ACETAMINOPHEN 7.5; 325 MG/1; MG/1
1 TABLET ORAL EVERY 6 HOURS PRN
Refills: 0 | Status: ACTIVE | OUTPATIENT
Start: 2025-05-14 | End: 2025-05-15

## 2025-05-14 RX ORDER — METOCLOPRAMIDE 10 MG/1
10 TABLET ORAL ONCE
Status: COMPLETED | OUTPATIENT
Start: 2025-05-14 | End: 2025-05-14

## 2025-05-14 RX ORDER — HALOPERIDOL 5 MG/ML
0.5 INJECTION INTRAMUSCULAR ONCE AS NEEDED
Status: ACTIVE | OUTPATIENT
Start: 2025-05-14 | End: 2025-05-14

## 2025-05-14 RX ORDER — PROCHLORPERAZINE EDISYLATE 5 MG/ML
5 INJECTION INTRAMUSCULAR; INTRAVENOUS ONCE AS NEEDED
Status: COMPLETED | OUTPATIENT
Start: 2025-05-14 | End: 2025-05-14

## 2025-05-14 RX ORDER — MORPHINE SULFATE 1 MG/ML
INJECTION, SOLUTION EPIDURAL; INTRATHECAL; INTRAVENOUS AS NEEDED
Status: DISCONTINUED | OUTPATIENT
Start: 2025-05-14 | End: 2025-05-14 | Stop reason: SURG

## 2025-05-14 RX ORDER — DIPHENHYDRAMINE HYDROCHLORIDE 50 MG/ML
12.5 INJECTION, SOLUTION INTRAMUSCULAR; INTRAVENOUS EVERY 4 HOURS PRN
Status: ACTIVE | OUTPATIENT
Start: 2025-05-14 | End: 2025-05-15

## 2025-05-14 RX ORDER — EPHEDRINE SULFATE 50 MG/ML
INJECTION INTRAVENOUS AS NEEDED
Status: DISCONTINUED | OUTPATIENT
Start: 2025-05-14 | End: 2025-05-14 | Stop reason: SURG

## 2025-05-14 RX ORDER — HYDROMORPHONE HYDROCHLORIDE 1 MG/ML
0.6 INJECTION, SOLUTION INTRAMUSCULAR; INTRAVENOUS; SUBCUTANEOUS
Refills: 0 | Status: ACTIVE | OUTPATIENT
Start: 2025-05-14 | End: 2025-05-15

## 2025-05-14 RX ORDER — METOCLOPRAMIDE HYDROCHLORIDE 5 MG/ML
10 INJECTION INTRAMUSCULAR; INTRAVENOUS ONCE
Status: COMPLETED | OUTPATIENT
Start: 2025-05-14 | End: 2025-05-14

## 2025-05-14 RX ORDER — ENOXAPARIN SODIUM 100 MG/ML
40 INJECTION SUBCUTANEOUS DAILY
Status: DISCONTINUED | OUTPATIENT
Start: 2025-05-14 | End: 2025-05-14 | Stop reason: HOSPADM

## 2025-05-14 RX ORDER — DEXAMETHASONE SODIUM PHOSPHATE 4 MG/ML
VIAL (ML) INJECTION AS NEEDED
Status: DISCONTINUED | OUTPATIENT
Start: 2025-05-14 | End: 2025-05-14 | Stop reason: SURG

## 2025-05-14 RX ORDER — METOCLOPRAMIDE HYDROCHLORIDE 5 MG/ML
INJECTION INTRAMUSCULAR; INTRAVENOUS
Status: COMPLETED
Start: 2025-05-14 | End: 2025-05-14

## 2025-05-14 RX ORDER — LIDOCAINE HYDROCHLORIDE 10 MG/ML
INJECTION, SOLUTION EPIDURAL; INFILTRATION; INTRACAUDAL; PERINEURAL AS NEEDED
Status: DISCONTINUED | OUTPATIENT
Start: 2025-05-14 | End: 2025-05-14 | Stop reason: SURG

## 2025-05-14 RX ORDER — GABAPENTIN 300 MG/1
300 CAPSULE ORAL EVERY 8 HOURS PRN
Status: DISCONTINUED | OUTPATIENT
Start: 2025-05-14 | End: 2025-05-17

## 2025-05-14 RX ORDER — HYDROMORPHONE HYDROCHLORIDE 2 MG/1
2 TABLET ORAL EVERY 4 HOURS PRN
Status: DISCONTINUED | OUTPATIENT
Start: 2025-05-15 | End: 2025-05-17

## 2025-05-14 RX ORDER — CHOLECALCIFEROL (VITAMIN D3) 25 MCG
1 TABLET,CHEWABLE ORAL DAILY
Status: DISCONTINUED | OUTPATIENT
Start: 2025-05-14 | End: 2025-05-17

## 2025-05-14 RX ORDER — CITRIC ACID/SODIUM CITRATE 334-500MG
30 SOLUTION, ORAL ORAL ONCE
Status: COMPLETED | OUTPATIENT
Start: 2025-05-14 | End: 2025-05-14

## 2025-05-14 RX ORDER — SODIUM CHLORIDE, SODIUM LACTATE, POTASSIUM CHLORIDE, CALCIUM CHLORIDE 600; 310; 30; 20 MG/100ML; MG/100ML; MG/100ML; MG/100ML
INJECTION, SOLUTION INTRAVENOUS CONTINUOUS
Status: ACTIVE | OUTPATIENT
Start: 2025-05-14 | End: 2025-05-15

## 2025-05-14 RX ORDER — LEVOTHYROXINE SODIUM 112 UG/1
112 TABLET ORAL
Status: DISCONTINUED | OUTPATIENT
Start: 2025-05-15 | End: 2025-05-17

## 2025-05-14 RX ORDER — FAMOTIDINE 20 MG/1
20 TABLET, FILM COATED ORAL ONCE
Status: COMPLETED | OUTPATIENT
Start: 2025-05-14 | End: 2025-05-14

## 2025-05-14 RX ORDER — DOCUSATE SODIUM 100 MG/1
100 CAPSULE, LIQUID FILLED ORAL
Status: DISCONTINUED | OUTPATIENT
Start: 2025-05-14 | End: 2025-05-17

## 2025-05-14 RX ORDER — IBUPROFEN 600 MG/1
600 TABLET, FILM COATED ORAL EVERY 6 HOURS
Status: DISCONTINUED | OUTPATIENT
Start: 2025-05-15 | End: 2025-05-15

## 2025-05-14 RX ORDER — HYDROMORPHONE HYDROCHLORIDE 1 MG/ML
0.4 INJECTION, SOLUTION INTRAMUSCULAR; INTRAVENOUS; SUBCUTANEOUS
Refills: 0 | Status: ACTIVE | OUTPATIENT
Start: 2025-05-14 | End: 2025-05-15

## 2025-05-14 RX ORDER — HYDROMORPHONE HYDROCHLORIDE 1 MG/ML
0.2 INJECTION, SOLUTION INTRAMUSCULAR; INTRAVENOUS; SUBCUTANEOUS EVERY 2 HOUR PRN
Status: DISCONTINUED | OUTPATIENT
Start: 2025-05-15 | End: 2025-05-17

## 2025-05-14 RX ORDER — BUPIVACAINE HYDROCHLORIDE 7.5 MG/ML
INJECTION, SOLUTION INTRASPINAL AS NEEDED
Status: DISCONTINUED | OUTPATIENT
Start: 2025-05-14 | End: 2025-05-14 | Stop reason: SURG

## 2025-05-14 RX ORDER — ENOXAPARIN SODIUM 100 MG/ML
40 INJECTION SUBCUTANEOUS DAILY
Status: DISCONTINUED | OUTPATIENT
Start: 2025-05-15 | End: 2025-05-17

## 2025-05-14 RX ORDER — DIPHENHYDRAMINE HCL 25 MG
25 CAPSULE ORAL EVERY 4 HOURS PRN
Status: ACTIVE | OUTPATIENT
Start: 2025-05-14 | End: 2025-05-15

## 2025-05-14 RX ORDER — ACETAMINOPHEN 500 MG
1000 TABLET ORAL ONCE
Status: COMPLETED | OUTPATIENT
Start: 2025-05-14 | End: 2025-05-14

## 2025-05-14 RX ORDER — NALBUPHINE HYDROCHLORIDE 10 MG/ML
2.5 INJECTION INTRAMUSCULAR; INTRAVENOUS; SUBCUTANEOUS
Status: DISCONTINUED | OUTPATIENT
Start: 2025-05-14 | End: 2025-05-14

## 2025-05-14 RX ORDER — SIMETHICONE 80 MG
80 TABLET,CHEWABLE ORAL 3 TIMES DAILY PRN
Status: DISCONTINUED | OUTPATIENT
Start: 2025-05-14 | End: 2025-05-17

## 2025-05-14 RX ORDER — NALBUPHINE HYDROCHLORIDE 10 MG/ML
2.5 INJECTION INTRAMUSCULAR; INTRAVENOUS; SUBCUTANEOUS EVERY 4 HOURS PRN
Status: ACTIVE | OUTPATIENT
Start: 2025-05-14 | End: 2025-05-15

## 2025-05-14 RX ORDER — ONDANSETRON 2 MG/ML
4 INJECTION INTRAMUSCULAR; INTRAVENOUS EVERY 6 HOURS PRN
Status: DISCONTINUED | OUTPATIENT
Start: 2025-05-14 | End: 2025-05-17

## 2025-05-14 RX ORDER — ONDANSETRON 2 MG/ML
INJECTION INTRAMUSCULAR; INTRAVENOUS AS NEEDED
Status: DISCONTINUED | OUTPATIENT
Start: 2025-05-14 | End: 2025-05-14 | Stop reason: SURG

## 2025-05-14 RX ORDER — SODIUM CHLORIDE, SODIUM LACTATE, POTASSIUM CHLORIDE, CALCIUM CHLORIDE 600; 310; 30; 20 MG/100ML; MG/100ML; MG/100ML; MG/100ML
125 INJECTION, SOLUTION INTRAVENOUS CONTINUOUS
Status: DISCONTINUED | OUTPATIENT
Start: 2025-05-14 | End: 2025-05-14 | Stop reason: HOSPADM

## 2025-05-14 RX ORDER — NALOXONE HYDROCHLORIDE 0.4 MG/ML
0.08 INJECTION, SOLUTION INTRAMUSCULAR; INTRAVENOUS; SUBCUTANEOUS
Status: ACTIVE | OUTPATIENT
Start: 2025-05-14 | End: 2025-05-15

## 2025-05-14 RX ORDER — KETOROLAC TROMETHAMINE 30 MG/ML
30 INJECTION, SOLUTION INTRAMUSCULAR; INTRAVENOUS EVERY 6 HOURS
Status: DISCONTINUED | OUTPATIENT
Start: 2025-05-14 | End: 2025-05-15

## 2025-05-14 RX ORDER — FAMOTIDINE 10 MG/ML
20 INJECTION, SOLUTION INTRAVENOUS ONCE
Status: COMPLETED | OUTPATIENT
Start: 2025-05-14 | End: 2025-05-14

## 2025-05-14 RX ORDER — HYDROCODONE BITARTRATE AND ACETAMINOPHEN 7.5; 325 MG/1; MG/1
2 TABLET ORAL EVERY 6 HOURS PRN
Refills: 0 | Status: ACTIVE | OUTPATIENT
Start: 2025-05-14 | End: 2025-05-15

## 2025-05-14 RX ORDER — AMMONIA 15 % (W/V)
0.3 AMPUL (EA) INHALATION AS NEEDED
Status: DISCONTINUED | OUTPATIENT
Start: 2025-05-14 | End: 2025-05-17

## 2025-05-14 RX ORDER — ONDANSETRON 2 MG/ML
4 INJECTION INTRAMUSCULAR; INTRAVENOUS ONCE AS NEEDED
Status: DISCONTINUED | OUTPATIENT
Start: 2025-05-14 | End: 2025-05-14

## 2025-05-14 RX ORDER — BISACODYL 10 MG
10 SUPPOSITORY, RECTAL RECTAL ONCE AS NEEDED
Status: DISCONTINUED | OUTPATIENT
Start: 2025-05-14 | End: 2025-05-17

## 2025-05-14 RX ORDER — ONDANSETRON 2 MG/ML
4 INJECTION INTRAMUSCULAR; INTRAVENOUS ONCE AS NEEDED
Status: COMPLETED | OUTPATIENT
Start: 2025-05-14 | End: 2025-05-14

## 2025-05-14 RX ORDER — KETOROLAC TROMETHAMINE 30 MG/ML
30 INJECTION, SOLUTION INTRAMUSCULAR; INTRAVENOUS ONCE
Status: COMPLETED | OUTPATIENT
Start: 2025-05-14 | End: 2025-05-14

## 2025-05-14 RX ADMIN — SODIUM CHLORIDE, SODIUM LACTATE, POTASSIUM CHLORIDE, CALCIUM CHLORIDE: 600; 310; 30; 20 INJECTION, SOLUTION INTRAVENOUS at 12:59:00

## 2025-05-14 RX ADMIN — SODIUM CHLORIDE, SODIUM LACTATE, POTASSIUM CHLORIDE, CALCIUM CHLORIDE: 600; 310; 30; 20 INJECTION, SOLUTION INTRAVENOUS at 13:56:00

## 2025-05-14 RX ADMIN — DEXAMETHASONE SODIUM PHOSPHATE 4 MG: 4 MG/ML VIAL (ML) INJECTION at 13:28:00

## 2025-05-14 RX ADMIN — MORPHINE SULFATE 0.3 MG: 1 INJECTION, SOLUTION EPIDURAL; INTRATHECAL; INTRAVENOUS at 13:06:00

## 2025-05-14 RX ADMIN — LIDOCAINE HYDROCHLORIDE 3 ML: 10 INJECTION, SOLUTION EPIDURAL; INFILTRATION; INTRACAUDAL; PERINEURAL at 13:06:00

## 2025-05-14 RX ADMIN — ONDANSETRON 4 MG: 2 INJECTION INTRAMUSCULAR; INTRAVENOUS at 13:28:00

## 2025-05-14 RX ADMIN — BUPIVACAINE HYDROCHLORIDE 1.6 ML: 7.5 INJECTION, SOLUTION INTRASPINAL at 13:06:00

## 2025-05-14 RX ADMIN — EPHEDRINE SULFATE 10 MG: 50 INJECTION INTRAVENOUS at 13:15:00

## 2025-05-14 NOTE — ANESTHESIA PROCEDURE NOTES
Spinal Block    Date/Time: 5/14/2025 1:06 PM    Performed by: Apolinar Stevens MD  Authorized by: Apolinar Stevens MD      General Information and Staff    Start Time:  5/14/2025 1:06 PM  End Time:  5/14/2025 1:07 PM  Anesthesiologist:  Apolinar Stevens MD  Performed by:  Anesthesiologist  Patient Location:  OB  Site identification: surface landmarks    Reason for Block: at surgeon's request, post-op pain management and surgical anesthesia    Preanesthetic Checklist: patient identified, IV checked, risks and benefits discussed, monitors and equipment checked, pre-op evaluation, timeout performed, anesthesia consent and sterile technique used      Procedure Details    Patient Position:  Sitting  Prep: ChloraPrep and patient draped    Monitoring:  Cardiac monitor, heart rate and continuous pulse ox  Approach:  Midline  Location:  L3-4  Injection Technique:  Single-shot    Needle    Needle Type:  Pencil-tip  Needle Gauge:  24 G  Needle Length:  3.5 in    Assessment    Sensory Level:  T4  Events: clear CSF, CSF aspirated, well tolerated and blood negative      Additional Comments

## 2025-05-14 NOTE — PROGRESS NOTES
Patient transferred to mother/baby room 367 per cart in stable condition with baby and personal belongings.  Accompanied by  and staff.  Report given to Rose mother/baby SEEMA.

## 2025-05-14 NOTE — PROGRESS NOTES
Pt is a 34 year old female admitted to 375/375-A.     Chief Complaint   Patient presents with    Non-stress Test     High BMI      Pt is  39w0d intra-uterine pregnancy.  History obtained, consents signed. Oriented to room, staff, and plan of care.

## 2025-05-14 NOTE — PLAN OF CARE
Patient received into room 367 via cart. Bedside report received from SEEMA Ochoa. Patient transferred to bed without incident. Bed locked and in low position. Side rails up x2. Fundus firm, U/U, lochia small, no clots noted. IV site unremarkable, infusing LR at 125 ml/hr and pitocin at 62.5 ml/hr. Pain managed. Baby present at bedside in open crib, ID bands verified. Patient/family oriented to unit, room, and call light. Call light within patient reach. Reinforced to call for assistance before getting out of bed. Plan of care reviewed, patient verbalized understanding.

## 2025-05-14 NOTE — ANESTHESIA POSTPROCEDURE EVALUATION
Patient: Shirin Cruz    Procedure Summary       Date: 25 Room / Location: [facility] L+D OR  [facility] L+D OR    Anesthesia Start: 1259 Anesthesia Stop: 1403    Procedure:  SECTION (Abdomen) Diagnosis: (Repeat C/S mild pre eclampsia)    Surgeons: Leonel Torres DO Anesthesiologist: Jacinto Stevens MD    Anesthesia Type: spinal ASA Status: 3            Anesthesia Type: spinal    Vitals Value Taken Time   /71 25 14:00   Temp 97.7 °F (36.5 °C) 25 14:00   Pulse 93 25 14:00   Resp 16 25 14:00   SpO2 93 % 25 14:00       EM AN Post Evaluation:   Patient Evaluated in PACU  Patient Participation: complete - patient participated  Level of Consciousness: awake and alert  Pain Score: 0  Pain Management: adequate  Airway Patency:patent  Yes    Nausea/Vomiting: none  Cardiovascular Status: acceptable  Respiratory Status: acceptable  Postoperative Hydration acceptable      JACINTO STEVENS MD  2025 2:04 PM   SW, with CM dept,  confirmed that this pt is being followed by BMT staff and hem/onc SW.      Sydney Gomez, LCSW   PRN

## 2025-05-14 NOTE — H&P
[facility]  part of [facility]    History & Physical    Shirin Cruz Patient Status:  Inpatient    1990 MRN [id number]   Location [facility] Attending Mariela Ontiveros MD   Hosp Day # 0 PCP Deisy Barrientos MD     Date of Admission:  2025      HPI:   Shirin Cruz is a 34 year old  female, current EGA of 39w0d with an estimated date of delivery of: 2025, by Ultrasound who presents due to routine scheduled NST for high BMI -- BPs 140s/90s -- labs normal except urine P:C ratio 0.4. NO HA / BV / RUQ pain. On schedule for RCSx tomorrow.  Being admitted for .  PNC notable for:  Problem List:   Patient Active Problem List    Diagnosis    Pregnancy (HCC)    Uncertain dates, antepartum ([facility])     Use EDC on u/s      Previous  section     Scheduled 2024: for rCSx at 39 week. Undecided on BTL      Thyroid disease during pregnancy in first trimester ([facility])     Recent Labs     23  1802 23  1724 24  0939 24  0734 10/28/24  1848 25  1403   T4F 0.8  --  1.6 1.6  --   --    TSH 25.000*   < > 0.108* 0.071* 2.760 2.481    < > = values in this interval not displayed.     Keep TSH <= 2.5 in first trimester & then <=3.0 thereafter (return to pre pregn dose after delivery)  Check TSH q 4-6 wks til 20 wks, then at 24-28 wks then at 34-36 wks  Hyperthyroid then serial growth MFM        BMI 38     Ultrasound 3/25/25- 65th%ile- normal growth  If BMI over 35, [  ] level 2 u/s & then follow MFM recs         Hypothyroidism, postablative     Fetal Movement reported as good.    GBS and RH reviewed.  Lab Results   Component Value Date    ABO BLOOD TYPE A 2025    RH BLOOD TYPE Positive 2025    HGB 11.5 (L) 2025    .0 2025    HCT 37.3 2025    Rubella IgG Positive 10/28/2024    Treponemal Antibodies Nonreactive 2025    HIV Antigen Antibody Combo Non-Reactive 2025     V. Zoster IgG Immunity 1,239.00 10/28/2024    Group B PCR Negative 2025         History   Obstetric History:   OB History    Para Term  AB Living   2 1 1 0 0 1   SAB IAB Ectopic Multiple Live Births   0 0 0 0 1      # Outcome Date GA Lbr Jose/2nd Weight Sex Type Anes PTL Lv   2 Current            1 Term 20 40w3d  7 lb 5.5 oz (3.33 kg) F Caesarean EPI N RAYMOND      Complications: Failure to progress       Gyne History: Cervical Papanicolaou to be done in MD's office  , irregular menses, hx of STD: none    Past Medical History:   Past Medical History:    Abdominal pain    Acute epiploic appendagitis    Anemia    Anxiety    took Lorazepam for anxiety for 1-2 months, started taking 1mg, then decreased to taking 0.5mg    Decorative tattoo    Graves disease    Radioactive iodine tx.    Hypothyroidism       Past Surgerical History:   Past Surgical History:   Procedure Laterality Date    Ankle fracture surgery Right 2022    metal lizette in right ankle due to fracture      2020    Other Right 2022    Root Cannal       Social History:   Social History     Tobacco Use    Smoking status: Never    Smokeless tobacco: Never   Substance Use Topics    Alcohol use: Never        Allergies/Medications:   Allergies:   No Known Allergies    Medications:  Medications Prior to Admission   Medication Sig Dispense Refill Last Dose/Taking    Prenatal Multivit-Min-Fe-FA (PRENATAL VITAMINS OR) Take by mouth.   2025    levothyroxine 112 MCG Oral Tab Take 1 tablet (112 mcg total) by mouth before breakfast. 90 tablet 3 2025    Loratadine 5 MG Oral Chew Tab Chew 1 tablet (5 mg total) by mouth daily.            Review of Systems:   As documented in HPI      Physical Exam:   Pulse:  [84-91] 91  BP: (142-149)/(72-86) 142/86    Constitutional: alert and cooperative in No distress    Abdomen: gravid nontender, EFW 8 lbs,     Vaginal exam: defer    No data found.     FHT assessment:    Category: 1  tracing    Results:     Lab Results   Component Value Date    TREPONEMALAB Nonreactive 2025    ABO A 2025    RH Positive 2025    WBC 8.3 2025    HGB 11.5 (L) 2025    HCT 37.3 2025    .0 2025    CREATSERUM 0.64 2025    BUN 11 2025     2025    K 4.1 2025     2025    CO2 22.0 2025     (H) 2025    CA 8.8 2025    ALB 3.7 2025    ALKPHO 138 (H) 2025    BILT 0.5 2025    TP 6.4 2025    AST 14 2025    ALT 8 (L) 2025    PTT 27.8 2022    INR 0.95 2022    T4F 1.6 2024    TSH 2.481 2025    MG 2.3 2022    B12 462 10/30/2024       Lab Results   Component Value Date    COLORUR Yellow 2025    CLARITY Clear 2025    SPECGRAVITY >1.030 (H) 2025    PROUR 100 (A) 2025    GLUUR Normal 2025    KETUR Trace (A) 2025    BILUR Negative 2025    BLOODURINE Negative 2025    NITRITE Negative 2025    UROBILINOGEN Normal 2025    LEUUR 25 (A) 2025          No results found.              Assessment/Plan:     IUP 39w0d in / with mild pre-eclampsia for rcSx    Obstetrical history significant for as listed.   Patient Active Problem List   Diagnosis    Hypothyroidism, postablative    Previous  section    Thyroid disease during pregnancy in first trimester (HCC)    BMI 38    Uncertain dates, antepartum (HCC)    Pregnancy (HCC)       Treatment Plan:    Declines BTL.  Ate 5-6 pm thus plan for rCSx at 0730 unless severe range BPs or fetal decels  Keep hydrated overnight      Risks, benefits, alternatives and possible complications have been discussed in detail with the patient.   Pre-admission, admission, and post admission procedures and expectations were discussed in detail.    All questions answered; all appropriate consents will be signed at the Hospital.        Mariela Ontiveros MD  2025  8:31 PM

## 2025-05-14 NOTE — DISCHARGE SUMMARY
[facility]  part of [facility]    Discharge Summary    Shirin Cruz Patient Status:  Inpatient    1990 MRN [id number]   Location [facility] Attending Mariela Ontiveros MD   Hosp Day # 4       Delivering OB Clinician: Dr. Torres    Admission date: 25  Discharge Date: 25    EDC: Estimated Date of Delivery: 25    Gestational Age: 39w1d    Antepartum complications:   Patient Active Problem List   Diagnosis    Hypothyroidism, postablative    Previous  section    Thyroid disease during pregnancy in first trimester (HCC)    BMI 38    Uncertain dates, antepartum (HCC)    Pregnancy ([facility])    Previous  delivery affecting pregnancy ([facility])       Date of Delivery: 2025 Time of Delivery: 1:28 PM    Delivery Type: repeat Csection    Baby: Liveborn female   Information for the patient's :  Anthony Girl [[id number]]   7 lb 5.8 oz (3.34 kg)  Apgars:  1 minute: 8  5 minutes: 910 minutes:       Intrapartum Complications: None    Hospital Course: 33 yo  @ 39w0d presents 25 for scheduled NST for BMI.  Found to have elevated BPs and labs c/w mild preE.  Patient had eaten dinner before NST so unable to do Repeat  overnight.  Admitted for BP and fetal observation.  Underwent RCS on 25. Routine delivery and postpartum care    Discharge Physical Exam:   /82 (BP Location: Left arm)   Pulse 77   Temp 97.8 °F (36.6 °C) (Oral)   Resp 20   Ht 5' 5\" (1.651 m)   Wt 242 lb 8.1 oz (110 kg)   LMP 2024 (Within Days)   SpO2 98%   Breastfeeding No   BMI 40.36 kg/m²   General appearance:  alert, appears stated age, cooperative and no distress  Abdominal: soft, non-tender, no rebound  Uterus: firm, nontender, below umbilicus  Incision: clean, dry and intact  Pelvic: deferred  Extremities: Homans sign is negative, no sign of DVT    Discharged Condition: stable    Disposition: home      Plan:     Follow-up  appointment in 1 week for NUrse blood pressure check and then at 6 weeks with Dr. Torres

## 2025-05-14 NOTE — OPERATIVE REPORT
Operative Report for  Section:    Date: 25  Patient: Shirin Cruz  : 1990  MRN: [id number]    Preoperative Diagnosis: Intrauterine Pregnancy 39w1d, Previous  x 1, Mild preE  Postoperative Diagnosis: same  Procedure(s): Repeat  Section via Pfannensteil Incision  Surgeon: Tyson Torres DO  Assistant: Gracia Campbell MD  Type of anesthesia: Spinal  Complications: none  Quantitative Blood Loss (mL) 9    IV Fluids: 900 cc  Urine OutPut:  50 cc clear yellow urine at the end of the procedure  Antibiotics: Ancef 2g  Pathology: Placenta    The surgical assistant provided aid in exposure, hemostasis, closure, and other intraoperative technical functions to help the surgeon carry out a safe operation and optimize results.       Infant:  Date of Delivery: 2025   Time of Delivery: 1:28 PM    Infant Sex  Information for the patient's :  Jewel Cruz [[id number]]   female    Infant Birthweight  Information for the patient's :  Anthony Girl [[id number]]   7 lb 5.8 oz (3.34 kg)       Apgars:  1 minute: 8               5 minutes: 9                        10 minutes:        Findings:  --viable female infant in cephalic presentation  -- scant clear amniotic fluid  --Normal appearing uterus, bilateral tubes and ovaries      The  section was fully reviewed with the patient/family and written informed consent was obtained.  Risks including, but not limited to bleeding, infection, injury to surrounding organs, need for blood transfusion and/or additional procedures All questions were answered.     Procedure details:  The patient was taken to the operating room after informed consent was given. A Time Out was held the patient was identified and the procedure verified as  Delivery.    At that time she was then prepared and draped in the normal sterile fashion in the dorsal supine position with a leftward tilt, and anesthesia was found to be adequate. A Pfannenstiel  skin incision was made with the scalpel and carried through to the underlying layer of fascia.  The fascia was then incised in the midline and the incision extended laterally with the novak scissors.  The superior aspect of the fascial incision was then grasped with Kocher clamps, elevated, and the underlying rectus muscles dissected off.  Attention was then turned to the inferior aspect of this incision which, in a similar fashion, was grasped, tented up with the Kocher clamps, and the rectus muscles dissected off.  The rectus muscles were then  in the midline and the peritoneum identified, and entered.  The peritoneal incision was then extended superiorly and inferiorly with good visualization of the bladder.  The bladder blade was then inserted and the vesicouterine peritoneum identified, grasped and entered sharply with the Metzenbaum scissors.  This incision was then extended laterally and the bladder flap created digitally.      The bladder blade was then reinserted and the lower uterine segment incised in a low transverse fashion with the scalpel.  The uterine incision was then extended digitally.  The bladder blade was removed and the infants head was delivered atraumatically.  The nose and mouth were suctioned with bulb suction and the cord doubly clamped then cut after 30 sec delay. The infant was handed off to the waiting CHANEL team. Cord gases were sent.    The placenta was then delivered.  The uterus was exteriorized and cleared of all clots and debris.  The uterine incision was repaired with 0-Vicryl in a running locked fashion.  The adnexa were reviewed and noted to be grossly normal.  The posterior gutter was cleared of all clots and debris.  The uterus was returned to the abdomen.  The lateral gutters were cleared of all clots. Excellent hemostasis was noted again along the repaired hysterotomy.  The fascia was reapproximated with 0-Vicryl in a running fashion.  Irrigation was carried out.   The subcutaneous adipose layer was approximated with Plain gut in a running fashion.  The skin was closed with 4-0 Vicryl in subcuticular manner.      The patient tolerated the procedure well.  Sponge, lap, and needle counts were correct.  The patient was taken to the recovery room in stable condition.    Tyson Torres DO    05/14/25, 2:24 PM

## 2025-05-14 NOTE — ANESTHESIA PREPROCEDURE EVALUATION
Anesthesia PreOp Note    HPI:     Shirin Cruz is a 34 year old female who presents for preoperative consultation requested by: Leonel Torres DO    Date of Surgery: 2025    Procedure(s):   SECTION  Indication: repeat c/s    Relevant Problems   No relevant active problems       NPO:  Last Liquid Consumption Date: 25  Last Liquid Consumption Time:   Last Solid Consumption Date: 25  Last Solid Consumption Time:   Last Liquid Consumption Date: 25          History Review:  Patient Active Problem List    Diagnosis Date Noted    Pregnancy (HCC) 2025    Uncertain dates, antepartum (HCC) 2024    Previous  section 2024    Thyroid disease during pregnancy in first trimester (HCC) 2024    BMI 38 2024    Hypothyroidism, postablative 10/05/2017       Past Medical History[1]    Past Surgical History[2]    Prescriptions Prior to Admission[3]  Current Medications and Prescriptions Ordered in Epic[4]    Allergies[5]    Family History[6]  Social Hx on file[7]    Available pre-op labs reviewed.  Lab Results   Component Value Date    WBC 8.3 2025    RBC 4.61 2025    HGB 11.5 (L) 2025    HCT 37.3 2025    MCV 80.9 2025    MCH 24.9 (L) 2025    MCHC 30.8 (L) 2025    RDW 14.3 2025    .0 2025     Lab Results   Component Value Date     2025    K 4.1 2025     2025    CO2 22.0 2025    BUN 11 2025    CREATSERUM 0.64 2025     (H) 2025    CA 8.8 2025          Vital Signs:  There is no height or weight on file to calculate BMI.   oral temperature is 98.2 °F (36.8 °C). Her blood pressure is 144/80 and her pulse is 75. Her respiration is 18.   Vitals:    25 0415 25 0600 25 0815 25 1045   BP: 149/89 152/86 (!) 155/103 144/80   Pulse: 86 79 93 75   Resp: 18 18     Temp: 98.7 °F (37.1 °C) 98.2 °F (36.8 °C)     TempSrc:  Oral Oral          Anesthesia Evaluation     Patient summary reviewed and Nursing notes reviewed    No history of anesthetic complications   Airway   Mallampati: II  TM distance: >3 FB  Neck ROM: full  Dental - Dentition appears grossly intact     Pulmonary - negative ROS and normal exam   Cardiovascular - negative ROS and normal exam  Exercise tolerance: good    Neuro/Psych    (+)  anxiety/panic attacks,        GI/Hepatic/Renal - negative ROS     Endo/Other    (+) hypothyroidism    Comments: Morbid obesity  Abdominal  - normal exam                 Anesthesia Plan:   ASA:  3  Plan:   Spinal  Post-op Pain Management: IV analgesics, Oral pain medication and Intrathecal narcotics  Informed Consent Plan and Risks Discussed With:  Patient and spouse      I have informed Shirin Cruz of the nature of the anesthetic plan, benefits, risks including possible dental damage if relevant, major complications, and any alternative forms of anesthetic management.   All of the patient's questions were answered to the best of my ability. The patient desires the anesthetic management as planned.  I have informed Shirin Cruz of the risks of spinal anesthesia including, but not limited to: failure, headache, backache, difficulty breathing, infection, bleeding, nerve damage, paralysis, death. The patient desires the proposed anesthetic as planned.   JACINTO PURI MD  2025 11:21 AM  Present on Admission:  **None**           [1]   Past Medical History:   Abdominal pain    Acute epiploic appendagitis    Anemia    Anxiety    took Lorazepam for anxiety for 1-2 months, started taking 1mg, then decreased to taking 0.5mg    Decorative tattoo    Graves disease    Radioactive iodine tx.    Hypothyroidism   [2]   Past Surgical History:  Procedure Laterality Date    Ankle fracture surgery Right 2022    metal lizette in right ankle due to fracture      2020    Other Right 2022    Root Cannal   [3]   Medications Prior  to Admission   Medication Sig Dispense Refill Last Dose/Taking    Loratadine 5 MG Oral Chew Tab Chew 1 tablet (5 mg total) by mouth daily.   Past Week    Prenatal Multivit-Min-Fe-FA (PRENATAL VITAMINS OR) Take by mouth.   5/13/2025    levothyroxine 112 MCG Oral Tab Take 1 tablet (112 mcg total) by mouth before breakfast. 90 tablet 3 5/13/2025   [4]   Current Facility-Administered Medications Ordered in Epic   Medication Dose Route Frequency Provider Last Rate Last Admin    lactated ringers infusion  125 mL/hr Intravenous Continuous Mariela Ontiveros MD        enoxaparin (Lovenox) 40 MG/0.4ML SUBQ injection 40 mg  40 mg Subcutaneous Daily Mariela Ontiveros MD        ondansetron (Zofran) 4 MG/2ML injection 4 mg  4 mg Intravenous Q6H PRN Mariela Ontiveros MD        oxyTOCIN in sodium chloride 0.9% (Pitocin) 30 Units/500mL infusion premix  62.5-900 iker-units/min Intravenous Continuous Mariela Ontiveros MD   Held at 05/14/25 0445    ceFAZolin (Ancef) 2g in 10mL IV syringe premix  2 g Intravenous Q8H Mariela Ontiveros MD   Held at 05/14/25 0600    famotidine (Pepcid) tab 20 mg  20 mg Oral Once Mariela Ontiveros MD        Or    famotidine (Pepcid) 20 mg/2mL injection 20 mg  20 mg Intravenous Once Mariela Ontiveros MD        metoclopramide (Reglan) tab 10 mg  10 mg Oral Once Mariela Ontiveros MD        Or    metoclopramide (Reglan) 5 mg/mL injection 10 mg  10 mg Intravenous Once Mariela Ontiveros MD        sodium citrate-citric acid (Bicitra) 500-334 MG/5ML oral solution 30 mL  30 mL Oral Once Mariela Ontiveros MD        lactated ringers infusion   Intravenous Continuous Mariela Ontiveros MD        acetaminophen (Tylenol Extra Strength) tab 500 mg  500 mg Oral Q6H PRN Mariela Ontiveros MD        Or    acetaminophen (Tylenol Extra Strength) tab 1,000 mg  1,000 mg Oral Q6H PRN Mariela Ontiveros MD        levothyroxine (Synthroid) tab 112 mcg  112 mcg Oral Before breakfast Mariela Ontiveros MD   112 mcg at 05/14/25 0700     No current Epic-ordered  outpatient medications on file.   [5] No Known Allergies  [6]   Family History  Problem Relation Age of Onset    Hypertension Father     Prostate Cancer Father     Other (Other) Father         Hyperthyroidism    Diabetes Father     Hypertension Mother     Diabetes Maternal Grandmother     Other (Other) Sister         Graves disease    Cancer Neg     Heart Disease Neg    [7]   Social History  Socioeconomic History    Marital status:    Tobacco Use    Smoking status: Never    Smokeless tobacco: Never   Vaping Use    Vaping status: Never Used   Substance and Sexual Activity    Alcohol use: Never    Drug use: Never    Sexual activity: Yes   Other Topics Concern    Caffeine Concern No

## 2025-05-14 NOTE — PROGRESS NOTES
Patient was scheduled for csection @ 730am today, hwoever in house unavaialble to assist.  BPs mild range and patient asymptomatic.  Moved to 1130 time slot.  Urgent twin delivery came in from home and will now move to OR soon for scheduled RCS for Shirin.  She denies any preE symptoms overnight.        Reviewed risks of csection including but not limited to bleeding, infection, injury, need for blood transfusion and additional procedure.  NPO, IVFs, consents, Anesthesia to see, SCDs, Antibiotics: Ancef 2g.  All questions answered.  Pt voices understanding and agrees to proceed. Anesthesia, In house OB, and CHANEL team notified.  OR being organized.

## 2025-05-15 ENCOUNTER — NST DOCUMENTATION (OUTPATIENT)
Dept: OBGYN CLINIC | Facility: CLINIC | Age: 35
End: 2025-05-15

## 2025-05-15 LAB
ALBUMIN SERPL-MCNC: 3.4 G/DL (ref 3.2–4.8)
ALBUMIN/GLOB SERPL: 1.5 {RATIO} (ref 1–2)
ALP LIVER SERPL-CCNC: 111 U/L (ref 37–98)
ALT SERPL-CCNC: 9 U/L (ref 10–49)
ANION GAP SERPL CALC-SCNC: 8 MMOL/L (ref 0–18)
AST SERPL-CCNC: 31 U/L (ref ?–34)
BASOPHILS # BLD AUTO: 0.04 X10(3) UL (ref 0–0.2)
BASOPHILS NFR BLD AUTO: 0.4 %
BILIRUB SERPL-MCNC: 0.8 MG/DL (ref 0.3–1.2)
BUN BLD-MCNC: 10 MG/DL (ref 9–23)
BUN/CREAT SERPL: 17.2 (ref 10–20)
CALCIUM BLD-MCNC: 7.9 MG/DL (ref 8.7–10.4)
CHLORIDE SERPL-SCNC: 103 MMOL/L (ref 98–112)
CO2 SERPL-SCNC: 24 MMOL/L (ref 21–32)
CREAT BLD-MCNC: 0.58 MG/DL (ref 0.55–1.02)
DEPRECATED RDW RBC AUTO: 41 FL (ref 35.1–46.3)
EGFRCR SERPLBLD CKD-EPI 2021: 122 ML/MIN/1.73M2 (ref 60–?)
EOSINOPHIL # BLD AUTO: 0.05 X10(3) UL (ref 0–0.7)
EOSINOPHIL NFR BLD AUTO: 0.5 %
ERYTHROCYTE [DISTWIDTH] IN BLOOD BY AUTOMATED COUNT: 14.3 % (ref 11–15)
GLOBULIN PLAS-MCNC: 2.2 G/DL (ref 2–3.5)
GLUCOSE BLD-MCNC: 80 MG/DL (ref 70–99)
HCT VFR BLD AUTO: 33.7 % (ref 35–48)
HGB BLD-MCNC: 10.4 G/DL (ref 12–16)
IMM GRANULOCYTES # BLD AUTO: 0.06 X10(3) UL (ref 0–1)
IMM GRANULOCYTES NFR BLD: 0.6 %
LYMPHOCYTES # BLD AUTO: 2.07 X10(3) UL (ref 1–4)
LYMPHOCYTES NFR BLD AUTO: 19.4 %
MAGNESIUM SERPL-MCNC: 4.5 MG/DL (ref 4.8–8.4)
MAGNESIUM SERPL-MCNC: 4.7 MG/DL (ref 4.8–8.4)
MAGNESIUM SERPL-MCNC: 5.5 MG/DL (ref 4.8–8.4)
MCH RBC QN AUTO: 24.6 PG (ref 26–34)
MCHC RBC AUTO-ENTMCNC: 30.9 G/DL (ref 31–37)
MCV RBC AUTO: 79.7 FL (ref 80–100)
MONOCYTES # BLD AUTO: 0.92 X10(3) UL (ref 0.1–1)
MONOCYTES NFR BLD AUTO: 8.6 %
NEUTROPHILS # BLD AUTO: 7.54 X10 (3) UL (ref 1.5–7.7)
NEUTROPHILS # BLD AUTO: 7.54 X10(3) UL (ref 1.5–7.7)
NEUTROPHILS NFR BLD AUTO: 70.5 %
OSMOLALITY SERPL CALC.SUM OF ELEC: 278 MOSM/KG (ref 275–295)
PLATELET # BLD AUTO: 212 10(3)UL (ref 150–450)
POTASSIUM SERPL-SCNC: 4.3 MMOL/L (ref 3.5–5.1)
PROT SERPL-MCNC: 5.6 G/DL (ref 5.7–8.2)
RBC # BLD AUTO: 4.23 X10(6)UL (ref 3.8–5.3)
SODIUM SERPL-SCNC: 135 MMOL/L (ref 136–145)
WBC # BLD AUTO: 10.7 X10(3) UL (ref 4–11)

## 2025-05-15 RX ORDER — NIFEDIPINE 30 MG/1
30 TABLET, EXTENDED RELEASE ORAL DAILY
Status: DISCONTINUED | OUTPATIENT
Start: 2025-05-15 | End: 2025-05-17

## 2025-05-15 RX ORDER — CALCIUM GLUCONATE 98 MG/ML
1 INJECTION, SOLUTION INTRAVENOUS ONCE AS NEEDED
Status: DISCONTINUED | OUTPATIENT
Start: 2025-05-15 | End: 2025-05-17

## 2025-05-15 RX ORDER — IBUPROFEN 600 MG/1
600 TABLET, FILM COATED ORAL EVERY 6 HOURS
Status: DISCONTINUED | OUTPATIENT
Start: 2025-05-15 | End: 2025-05-17

## 2025-05-15 NOTE — PLAN OF CARE
Problem: POSTPARTUM  Goal: Long Term Goal:Experiences normal postpartum course  Description: INTERVENTIONS:  - Assess and monitor vital signs and lab values.  - Assess fundus and lochia.  - Provide ice/sitz baths for perineum discomfort.  - Monitor healing of incision/episiotomy/laceration, and assess for signs and symptoms of infection and hematoma.  - Assess bladder function and monitor for bladder distention.  - Provide/instruct/assist with pericare as needed.  - Provide VTE prophylaxis as needed.  - Monitor bowel function.  - Encourage ambulation and provide assistance as needed.  - Assess and monitor emotional status and provide social service/psych resources as needed.  - Utilize standard precautions and use personal protective equipment as indicated. Ensure aseptic care of all intravenous lines and invasive tubes/drains.  - Obtain immunization and exposure to communicable diseases history.  Outcome: Progressing  Goal: Optimize infant feeding at the breast  Description: INTERVENTIONS:  - Initiate breast feeding within first hour after birth.   - Monitor effectiveness of current breast feeding efforts.  - Assess support systems available to mother/family.  - Identify cultural beliefs/practices regarding lactation, letdown techniques, maternal food preferences.  - Assess mother's knowledge and previous experience with breast feeding.  - Provide information as needed about early infant feeding cues (e.g., rooting, lip smacking, sucking fingers/hand) versus late cue of crying.  - Discuss/demonstrate breast feeding aids (e.g., infant sling, nursing footstool/pillows, and breast pumps).  - Encourage mother/other family members to express feelings/concerns, and actively listen.  - Educate father/SO about benefits of breast feeding and how to manage common lactation challenges.  - Recommend avoidance of specific medications or substances incompatible with breast feeding.  - Assess and monitor for signs of nipple  pain/trauma.  - Instruct and provide assistance with proper latch.  - Review techniques for milk expression (breast pumping) and storage of breast milk. Provide pumping equipment/supplies, instructions and assistance, as needed.  - Encourage rooming-in and breast feeding on demand.  - Encourage skin-to-skin contact.  - Provide LC support as needed.  - Assess for and manage engorgement.  - Provide breast feeding education handouts and information on community breast feeding support.   Outcome: Progressing  Goal: Establishment of adequate milk supply with medication/procedure interruptions  Description: INTERVENTIONS:  - Review techniques for milk expression (breast pumping).   - Provide pumping equipment/supplies, instructions, and assistance until it is safe to breastfeed infant.  Outcome: Progressing  Goal: Appropriate maternal -  bonding  Description: INTERVENTIONS:  - Assess caregiver- interactions.  - Assess caregiver's emotional status and coping mechanisms.  - Encourage caregiver to participate in  daily care.  - Assess support systems available to mother/family.  - Provide /case management support as needed.  Outcome: Progressing     Problem: Patient/Family Goals  Goal: Patient/Family Long Term Goal  Description: Patient's Long Term Goal:     Interventions:  -   - See additional Care Plan goals for specific interventions  Outcome: Progressing  Goal: Patient/Family Short Term Goal  Description: Patient's Short Term Goal:     Interventions:   -   - See additional Care Plan goals for specific interventions  Outcome: Progressing     Problem: GENITOURINARY - ADULT  Goal: Absence of urinary retention  Description: INTERVENTIONS:  - Assess patient’s ability to void and empty bladder  - Monitor intake/output and perform bladder scan as needed  - Follow urinary retention protocol/standard of care  - Consider collaborating with pharmacy to review patient's medication profile  -  Implement strategies to promote bladder emptying  Outcome: Progressing     Problem: GASTROINTESTINAL - ADULT  Goal: Minimal or absence of nausea and vomiting  Description: INTERVENTIONS:  - Maintain adequate hydration with IV or PO as ordered and tolerated  - Nasogastric tube to low intermittent suction as ordered  - Evaluate effectiveness of ordered antiemetic medications  - Provide nonpharmacologic comfort measures as appropriate  - Advance diet as tolerated, if ordered  - Obtain nutritional consult as needed  - Evaluate fluid balance  Outcome: Progressing  Goal: Maintains or returns to baseline bowel function  Description: INTERVENTIONS:  - Assess bowel function  - Maintain adequate hydration with IV or PO as ordered and tolerated  - Evaluate effectiveness of GI medications  - Encourage mobilization and activity  - Obtain nutritional consult as needed  - Establish a toileting routine/schedule  - Consider collaborating with pharmacy to review patient's medication profile  Outcome: Progressing  Goal: Maintains adequate nutritional intake (undernourished)  Description: INTERVENTIONS:  - Monitor percentage of each meal consumed  - Identify factors contributing to decreased intake, treat as appropriate  - Assist with meals as needed  - Monitor I&O, WT and lab values  - Obtain nutritional consult as needed  - Optimize oral hygiene and moisture  - Encourage food from home; allow for food preferences  - Enhance eating environment  Outcome: Progressing  Goal: Achieves appropriate nutritional intake (bariatric)  Description: INTERVENTIONS:  - Monitor for over-consumption  - Identify factors contributing to increased intake, treat as appropriate  - Monitor I&O, WT and lab values  - Obtain nutritional consult as needed  - Evaluate psychosocial factors contributing to over-consumption  Outcome: Progressing

## 2025-05-15 NOTE — CM/SW NOTE
Sw self referral for Medicaid.  SW met with patient bedside.  SW confirmed face sheet contact as correct.    Baby girl name: Alyson   Date & time of delivery: 25 1:28 PM  Delivery method:  section  Siblings age: Sister 4 y.o.    Patient employed: No  Length of maternity leave: NA    Father of baby employed: Yes  Length of paternity leave: 2 weeks    Breast or formula feed: Both    Pediatrician: Dr. Powers    Infant Insurance: [place] Medicaid  Change HC contacted: No    Mental Health History: Denies    Medications: Andrew    Therapist: Andrew    Psychiatrist: Andrew MOSQUEDA discussed signs, symptoms and risks associated with post partum depression & anxiety.  JAYLIN provided pt with PMAD resources.  Other resources provided: Mom line, Mood boost, SDOH [place], [place] Medicaid, WIC    Patient support system: FOB and Mom    Patient denied current questions/needs from JAYLIN.    SW to remain available for support and/or discharge planning.    Danisha Lux MSW, LSW   [id number]

## 2025-05-15 NOTE — PLAN OF CARE
Problem: POSTPARTUM  Goal: Long Term Goal:Experiences normal postpartum course  Description: INTERVENTIONS:  - Assess and monitor vital signs and lab values.  - Assess fundus and lochia.  - Provide ice/sitz baths for perineum discomfort.  - Monitor healing of incision/episiotomy/laceration, and assess for signs and symptoms of infection and hematoma.  - Assess bladder function and monitor for bladder distention.  - Provide/instruct/assist with pericare as needed.  - Provide VTE prophylaxis as needed.  - Monitor bowel function.  - Encourage ambulation and provide assistance as needed.  - Assess and monitor emotional status and provide social service/psych resources as needed.  - Utilize standard precautions and use personal protective equipment as indicated. Ensure aseptic care of all intravenous lines and invasive tubes/drains.  - Obtain immunization and exposure to communicable diseases history.  Outcome: Progressing  Goal: Optimize infant feeding at the breast  Description: INTERVENTIONS:  - Initiate breast feeding within first hour after birth.   - Monitor effectiveness of current breast feeding efforts.  - Assess support systems available to mother/family.  - Identify cultural beliefs/practices regarding lactation, letdown techniques, maternal food preferences.  - Assess mother's knowledge and previous experience with breast feeding.  - Provide information as needed about early infant feeding cues (e.g., rooting, lip smacking, sucking fingers/hand) versus late cue of crying.  - Discuss/demonstrate breast feeding aids (e.g., infant sling, nursing footstool/pillows, and breast pumps).  - Encourage mother/other family members to express feelings/concerns, and actively listen.  - Educate father/SO about benefits of breast feeding and how to manage common lactation challenges.  - Recommend avoidance of specific medications or substances incompatible with breast feeding.  - Assess and monitor for signs of nipple  pain/trauma.  - Instruct and provide assistance with proper latch.  - Review techniques for milk expression (breast pumping) and storage of breast milk. Provide pumping equipment/supplies, instructions and assistance, as needed.  - Encourage rooming-in and breast feeding on demand.  - Encourage skin-to-skin contact.  - Provide LC support as needed.  - Assess for and manage engorgement.  - Provide breast feeding education handouts and information on community breast feeding support.   Outcome: Progressing  Goal: Establishment of adequate milk supply with medication/procedure interruptions  Description: INTERVENTIONS:  - Review techniques for milk expression (breast pumping).   - Provide pumping equipment/supplies, instructions, and assistance until it is safe to breastfeed infant.  Outcome: Progressing  Goal: Appropriate maternal -  bonding  Description: INTERVENTIONS:  - Assess caregiver- interactions.  - Assess caregiver's emotional status and coping mechanisms.  - Encourage caregiver to participate in  daily care.  - Assess support systems available to mother/family.  - Provide /case management support as needed.  Outcome: Progressing     Problem: GENITOURINARY - ADULT  Goal: Absence of urinary retention  Description: INTERVENTIONS:  - Assess patient’s ability to void and empty bladder  - Monitor intake/output and perform bladder scan as needed  - Follow urinary retention protocol/standard of care  - Consider collaborating with pharmacy to review patient's medication profile  - Implement strategies to promote bladder emptying  Outcome: Progressing     Problem: GASTROINTESTINAL - ADULT  Goal: Minimal or absence of nausea and vomiting  Description: INTERVENTIONS:  - Maintain adequate hydration with IV or PO as ordered and tolerated  - Nasogastric tube to low intermittent suction as ordered  - Evaluate effectiveness of ordered antiemetic medications  - Provide nonpharmacologic  comfort measures as appropriate  - Advance diet as tolerated, if ordered  - Obtain nutritional consult as needed  - Evaluate fluid balance  Outcome: Progressing

## 2025-05-15 NOTE — NST
Nonstress Test   Patient: Shirin Cruz    Gestation: 36    Diagnosis from order:      Problem List: Problem List[1]    NST:        2025   NST DOCUMENTATION   Nonstress Test Second Interpretation Reactive         I agree with the above evaluation. NST completed.  Dana Fox MD  5/15/2025  3:13 PM             [1]   Patient Active Problem List  Diagnosis    Hypothyroidism, postablative    Previous  section    Thyroid disease during pregnancy in first trimester (HCC)    BMI 38    Uncertain dates, antepartum (HCC)    Pregnancy (HCC)    Previous  delivery affecting pregnancy (HCC)

## 2025-05-15 NOTE — ANESTHESIA POST-OP FOLLOW-UP NOTE
S/p c/s , IT duramorph   POD # 1, doing well   No HA no BA minimal itching /treated   No new neurologic signs of symptoms   Retains jones for hourly urine output measurements  Site is clean dry intact  Discharged from the service

## 2025-05-16 NOTE — PROGRESS NOTES
[facility]  part of [facility]    OB/Gyne Post  Progress Note      Shirin Cruz Patient Status:  Inpatient    1990 MRN [id number]   Location [facility] 3SE Attending Mariela Ontiveros MD   Hosp Day # 3 PCP Deisy Barrientos MD     Late entry- patient seen 5/15/2025 at 3:30p    Subjective     Good pain control. Tolerating present diet. Ambulating well. Voiding freely.  She denies headache, fever, chest pain, shortness of breath, dizziness upon ambulation nor leg pain.     Objective   Vital signs in last 24 hours:  Temp:  [97.5 °F (36.4 °C)-98 °F (36.7 °C)] 97.6 °F (36.4 °C)  Pulse:  [69-98] 80  Resp:  [15-19] 16  BP: (126-156)/(68-97) 149/88  SpO2:  [89 %-100 %] 98 %    Input/Output:    Intake/Output Summary (Last 24 hours) at 2025 0727  Last data filed at 2025 0644  Gross per 24 hour   Intake 1002.08 ml   Output 66675 ml   Net -9872.92 ml       AVSS  Constitutional: comfortable  Abdomen: soft nontender, incision clean dry and intact with tegaderm in place  Uterus: fundus below umbilicus, appropriately tender  Extremities: No calf tenderness, SCDs on while in bed, No pitting edema.      Results:   Labs / Path / Radiology:    Recent Results (from the past 24 hours)   CBC With Differential With Platelet    Collection Time: 05/15/25  8:55 AM   Result Value Ref Range    WBC 10.7 4.0 - 11.0 x10(3) uL    RBC 4.23 3.80 - 5.30 x10(6)uL    HGB 10.4 (L) 12.0 - 16.0 g/dL    HCT 33.7 (L) 35.0 - 48.0 %    MCV 79.7 (L) 80.0 - 100.0 fL    MCH 24.6 (L) 26.0 - 34.0 pg    MCHC 30.9 (L) 31.0 - 37.0 g/dL    RDW-SD 41.0 35.1 - 46.3 fL    RDW 14.3 11.0 - 15.0 %    .0 150.0 - 450.0 10(3)uL    Neutrophil Absolute Prelim 7.54 1.50 - 7.70 x10 (3) uL    Neutrophil Absolute 7.54 1.50 - 7.70 x10(3) uL    Lymphocyte Absolute 2.07 1.00 - 4.00 x10(3) uL    Monocyte Absolute 0.92 0.10 - 1.00 x10(3) uL    Eosinophil Absolute 0.05 0.00 - 0.70 x10(3) uL    Basophil Absolute 0.04 0.00 -  0.20 x10(3) uL    Immature Granulocyte Absolute 0.06 0.00 - 1.00 x10(3) uL    Neutrophil % 70.5 %    Lymphocyte % 19.4 %    Monocyte % 8.6 %    Eosinophil % 0.5 %    Basophil % 0.4 %    Immature Granulocyte % 0.6 %   Magnesium Sulfate Therapy    Collection Time: 05/15/25  8:55 AM   Result Value Ref Range    MG Sulfate Therapy 4.5 (L) 4.8 - 8.4 mg/dL   Comp Metabolic Panel (14)    Collection Time: 05/15/25  8:55 AM   Result Value Ref Range    Glucose 80 70 - 99 mg/dL    Sodium 135 (L) 136 - 145 mmol/L    Potassium 4.3 3.5 - 5.1 mmol/L    Chloride 103 98 - 112 mmol/L    CO2 24.0 21.0 - 32.0 mmol/L    Anion Gap 8 0 - 18 mmol/L    BUN 10 9 - 23 mg/dL    Creatinine 0.58 0.55 - 1.02 mg/dL    BUN/CREA Ratio 17.2 10.0 - 20.0    Calcium, Total 7.9 (L) 8.7 - 10.4 mg/dL    Calculated Osmolality 278 275 - 295 mOsm/kg    eGFR-Cr 122 >=60 mL/min/1.73m2    ALT 9 (L) 10 - 49 U/L    AST 31 <34 U/L    Alkaline Phosphatase 111 (H) 37 - 98 U/L    Bilirubin, Total 0.8 0.3 - 1.2 mg/dL    Total Protein 5.6 (L) 5.7 - 8.2 g/dL    Albumin 3.4 3.2 - 4.8 g/dL    Globulin  2.2 2.0 - 3.5 g/dL    A/G Ratio 1.5 1.0 - 2.0   Magnesium Sulfate Therapy    Collection Time: 05/15/25  2:48 PM   Result Value Ref Range    MG Sulfate Therapy 4.7 (L) 4.8 - 8.4 mg/dL   Magnesium Sulfate Therapy    Collection Time: 05/15/25  9:07 PM   Result Value Ref Range    MG Sulfate Therapy 5.5 4.8 - 8.4 mg/dL       Specimens (From admission, onward)      None            No results found.      Assessment/Plan   34 year oldyo  , s/p  without labor, POD# 2   Problem List[1].    ambulate, continue routine postpartum care      Dana Fox MD  2025  7:27 AM             [1]   Patient Active Problem List  Diagnosis    Hypothyroidism, postablative    Previous  section    Thyroid disease during pregnancy in first trimester (HCC)    BMI 38    Uncertain dates, antepartum (HCC)    Pregnancy (HCC)    Previous  delivery affecting pregnancy  (HCC)

## 2025-05-16 NOTE — PLAN OF CARE
Problem: POSTPARTUM  Goal: Long Term Goal:Experiences normal postpartum course  Description: INTERVENTIONS:  - Assess and monitor vital signs and lab values.  - Assess fundus and lochia.  - Provide ice/sitz baths for perineum discomfort.  - Monitor healing of incision/episiotomy/laceration, and assess for signs and symptoms of infection and hematoma.  - Assess bladder function and monitor for bladder distention.  - Provide/instruct/assist with pericare as needed.  - Provide VTE prophylaxis as needed.  - Monitor bowel function.  - Encourage ambulation and provide assistance as needed.  - Assess and monitor emotional status and provide social service/psych resources as needed.  - Utilize standard precautions and use personal protective equipment as indicated. Ensure aseptic care of all intravenous lines and invasive tubes/drains.  - Obtain immunization and exposure to communicable diseases history.  Outcome: Progressing  Goal: Optimize infant feeding at the breast  Description: INTERVENTIONS:  - Initiate breast feeding within first hour after birth.   - Monitor effectiveness of current breast feeding efforts.  - Assess support systems available to mother/family.  - Identify cultural beliefs/practices regarding lactation, letdown techniques, maternal food preferences.  - Assess mother's knowledge and previous experience with breast feeding.  - Provide information as needed about early infant feeding cues (e.g., rooting, lip smacking, sucking fingers/hand) versus late cue of crying.  - Discuss/demonstrate breast feeding aids (e.g., infant sling, nursing footstool/pillows, and breast pumps).  - Encourage mother/other family members to express feelings/concerns, and actively listen.  - Educate father/SO about benefits of breast feeding and how to manage common lactation challenges.  - Recommend avoidance of specific medications or substances incompatible with breast feeding.  - Assess and monitor for signs of nipple  pain/trauma.  - Instruct and provide assistance with proper latch.  - Review techniques for milk expression (breast pumping) and storage of breast milk. Provide pumping equipment/supplies, instructions and assistance, as needed.  - Encourage rooming-in and breast feeding on demand.  - Encourage skin-to-skin contact.  - Provide LC support as needed.  - Assess for and manage engorgement.  - Provide breast feeding education handouts and information on community breast feeding support.   Outcome: Progressing  Goal: Establishment of adequate milk supply with medication/procedure interruptions  Description: INTERVENTIONS:  - Review techniques for milk expression (breast pumping).   - Provide pumping equipment/supplies, instructions, and assistance until it is safe to breastfeed infant.  Outcome: Progressing  Goal: Appropriate maternal -  bonding  Description: INTERVENTIONS:  - Assess caregiver- interactions.  - Assess caregiver's emotional status and coping mechanisms.  - Encourage caregiver to participate in  daily care.  - Assess support systems available to mother/family.  - Provide /case management support as needed.  Outcome: Progressing     Problem: Patient/Family Goals  Goal: Patient/Family Long Term Goal  Description: Patient's Long Term Goal:     Interventions:  -   - See additional Care Plan goals for specific interventions  Outcome: Progressing  Goal: Patient/Family Short Term Goal  Description: Patient's Short Term Goal:     Interventions:   -   - See additional Care Plan goals for specific interventions  Outcome: Progressing     Problem: GENITOURINARY - ADULT  Goal: Absence of urinary retention  Description: INTERVENTIONS:  - Assess patient’s ability to void and empty bladder  - Monitor intake/output and perform bladder scan as needed  - Follow urinary retention protocol/standard of care  - Consider collaborating with pharmacy to review patient's medication profile  -  Implement strategies to promote bladder emptying  Outcome: Progressing     Problem: GASTROINTESTINAL - ADULT  Goal: Minimal or absence of nausea and vomiting  Description: INTERVENTIONS:  - Maintain adequate hydration with IV or PO as ordered and tolerated  - Nasogastric tube to low intermittent suction as ordered  - Evaluate effectiveness of ordered antiemetic medications  - Provide nonpharmacologic comfort measures as appropriate  - Advance diet as tolerated, if ordered  - Obtain nutritional consult as needed  - Evaluate fluid balance  Outcome: Progressing  Goal: Maintains or returns to baseline bowel function  Description: INTERVENTIONS:  - Assess bowel function  - Maintain adequate hydration with IV or PO as ordered and tolerated  - Evaluate effectiveness of GI medications  - Encourage mobilization and activity  - Obtain nutritional consult as needed  - Establish a toileting routine/schedule  - Consider collaborating with pharmacy to review patient's medication profile  Outcome: Progressing  Goal: Maintains adequate nutritional intake (undernourished)  Description: INTERVENTIONS:  - Monitor percentage of each meal consumed  - Identify factors contributing to decreased intake, treat as appropriate  - Assist with meals as needed  - Monitor I&O, WT and lab values  - Obtain nutritional consult as needed  - Optimize oral hygiene and moisture  - Encourage food from home; allow for food preferences  - Enhance eating environment  Outcome: Progressing  Goal: Achieves appropriate nutritional intake (bariatric)  Description: INTERVENTIONS:  - Monitor for over-consumption  - Identify factors contributing to increased intake, treat as appropriate  - Monitor I&O, WT and lab values  - Obtain nutritional consult as needed  - Evaluate psychosocial factors contributing to over-consumption  Outcome: Progressing     Problem: Patient Centered Care  Goal: Patient preferences are identified and integrated in the patient's plan of  care  Description: Interventions:  - What would you like us to know as we care for you?   - Provide timely, complete, and accurate information to patient/family  - Incorporate patient and family knowledge, values, beliefs, and cultural backgrounds into the planning and delivery of care  - Encourage patient/family to participate in care and decision-making at the level they choose  - Honor patient and family perspectives and choices  Outcome: Progressing

## 2025-05-16 NOTE — PROGRESS NOTES
[facility]  part of [facility]    Post  Progress Note      Shirin Cruz Patient Status:  Inpatient    1990 MRN [id number]   Location [facility] 3SE Attending Mariela Ontiveros MD   Hosp Day # 3 PCP Deisy Barrientos MD       Subjective     Good pain control. Tolerating present diet.   Feels better since stopping magnesium early this am    Objective   Vital signs in last 24 hours:  Temp:  [97.5 °F (36.4 °C)-98 °F (36.7 °C)] 97.6 °F (36.4 °C)  Pulse:  [71-98] 80  Resp:  [15-19] 16  BP: (128-156)/(68-97) 145/93  SpO2:  [89 %-100 %] 98 %    Input/Output:    Intake/Output Summary (Last 24 hours) at 2025 0926  Last data filed at 2025 0644  Gross per 24 hour   Intake 952.08 ml   Output 32656 ml   Net -9722.92 ml       Constitutional: comfortable  Abdomen: soft, nontender  Uterus: fundus firm and nontender  Wound/Incision:  Intact without erythema  Extremities: No calf tenderness      Results:   CBC:    Lab Results   Component Value Date    WBC 10.7 05/15/2025    WBC 8.3 2025    WBC 7.1 2025     Lab Results   Component Value Date    HEMOGLOBIN 12.8 2024    HGB 10.4 (L) 05/15/2025    HGB 11.5 (L) 2025    HGB 11.3 (L) 2025      Lab Results   Component Value Date    .0 05/15/2025    .0 2025    .0 2025        Assessment/Plan   POD #  2 --- stable  Pre eclampsia, s/p magnesium.  On Nifediine XL 30 mg    Routine PP care          Geraldine Ordaz MD  2025  9:26 AM

## 2025-05-17 VITALS
OXYGEN SATURATION: 98 % | HEART RATE: 75 BPM | RESPIRATION RATE: 16 BRPM | BODY MASS INDEX: 40.4 KG/M2 | HEIGHT: 65 IN | TEMPERATURE: 98 F | DIASTOLIC BLOOD PRESSURE: 81 MMHG | WEIGHT: 242.5 LBS | SYSTOLIC BLOOD PRESSURE: 150 MMHG

## 2025-05-17 RX ORDER — PSEUDOEPHEDRINE HCL 30 MG
100 TABLET ORAL 2 TIMES DAILY
Qty: 30 CAPSULE | Refills: 0 | Status: SHIPPED | OUTPATIENT
Start: 2025-05-17

## 2025-05-17 RX ORDER — LABETALOL 100 MG/1
100 TABLET, FILM COATED ORAL EVERY 12 HOURS SCHEDULED
Qty: 60 TABLET | Refills: 0 | Status: SHIPPED | OUTPATIENT
Start: 2025-05-17

## 2025-05-17 RX ORDER — IBUPROFEN 600 MG/1
600 TABLET, FILM COATED ORAL EVERY 6 HOURS PRN
Qty: 30 TABLET | Refills: 0 | Status: SHIPPED | OUTPATIENT
Start: 2025-05-17

## 2025-05-17 RX ORDER — LABETALOL 100 MG/1
100 TABLET, FILM COATED ORAL EVERY 12 HOURS SCHEDULED
Status: DISCONTINUED | OUTPATIENT
Start: 2025-05-17 | End: 2025-05-17

## 2025-05-17 RX ORDER — NIFEDIPINE 30 MG
30 TABLET, EXTENDED RELEASE ORAL DAILY
Qty: 30 TABLET | Refills: 0 | Status: SHIPPED | OUTPATIENT
Start: 2025-05-18

## 2025-05-17 RX ORDER — GABAPENTIN 300 MG/1
300 CAPSULE ORAL EVERY 8 HOURS PRN
Qty: 21 CAPSULE | Refills: 0 | Status: SHIPPED | OUTPATIENT
Start: 2025-05-17

## 2025-05-17 NOTE — DISCHARGE INSTRUCTIONS
Discharge Instructions for  Section ()  You had a  section, or . During the , your baby was delivered through a surgical incision in your stomach and uterus. Full recovery after a  can take time. It’s important to take care of yourself -- for your own sake and because your new baby needs you. Here are some guidelines to follow at home.  Incision care  Here's how to take care of your incision:  Shower as needed. Pat your incision dry.  Watch your incision for signs of infection, like increasing redness or drainage.  Hold a pillow against the incision when you laugh or cough and when you get up from a lying or sitting position.  Remember, it can take as long as 6 weeks for a  incision to heal.  Activity  Here are some suggestions:  Don’t try to take care of anyone other than your baby and yourself.  Remember, the more active you are, the more likely you are to have an increase in your bleeding.  Get lots of rest. Take naps in the afternoon.  Increase your activities gradually.  Plan your activities so that you don’t have to go up or down stairs more than necessary.  Do postsurgical deep breathing and coughing exercises. Ask your healthcare provider for instructions.  Don’t lift anything heavier than your baby until your healthcare provider tells you it’s OK.  Don’t drive until your healthcare provider says it’s OK.  Don’t have sexual intercourse until after you’ve had a follow-up appointment with your healthcare provider and you have decided on a birth control method.  Follow-up  Make a follow-up appointment as directed by our staff.  When to call your healthcare provider  Call your healthcare provider right away if you have any of the following:  Fever of 100.4°F (38°C) or higher  Redness, pain, or drainage at your incision site  Bleeding that requires a new sanitary pad every hour  Severe pain in the abdomen  Pain or urgency with urination  Foul odor from  vaginal discharge  Trouble urinating or emptying your bladder  No bowel movement within 4 days after the birth of your baby  Swollen, red, painful area in the leg  Appearance of rash or hives  Sore, red, painful area on the breasts that may be accompanied by flu-like symptoms  Feelings of anxiety, panic, and/or depression   Date Last Reviewed: 8/16/2015  © 3111-9519 [employer]. [address] [place] [address]. All rights reserved. This information is not intended as a substitute for professional medical care. Always follow your healthcare professional's instructions.

## 2025-05-17 NOTE — PLAN OF CARE
Problem: POSTPARTUM  Goal: Long Term Goal:Experiences normal postpartum course  Description: INTERVENTIONS:  - Assess and monitor vital signs and lab values.  - Assess fundus and lochia.  - Provide ice/sitz baths for perineum discomfort.  - Monitor healing of incision/episiotomy/laceration, and assess for signs and symptoms of infection and hematoma.  - Assess bladder function and monitor for bladder distention.  - Provide/instruct/assist with pericare as needed.  - Provide VTE prophylaxis as needed.  - Monitor bowel function.  - Encourage ambulation and provide assistance as needed.  - Assess and monitor emotional status and provide social service/psych resources as needed.  - Utilize standard precautions and use personal protective equipment as indicated. Ensure aseptic care of all intravenous lines and invasive tubes/drains.  - Obtain immunization and exposure to communicable diseases history.  5/17/2025 1107 by Yari Gilliam, RN  Outcome: Completed  5/17/2025 1100 by Yari Gilliam, RN  Outcome: Progressing  Goal: Optimize infant feeding at the breast  Description: INTERVENTIONS:  - Initiate breast feeding within first hour after birth.   - Monitor effectiveness of current breast feeding efforts.  - Assess support systems available to mother/family.  - Identify cultural beliefs/practices regarding lactation, letdown techniques, maternal food preferences.  - Assess mother's knowledge and previous experience with breast feeding.  - Provide information as needed about early infant feeding cues (e.g., rooting, lip smacking, sucking fingers/hand) versus late cue of crying.  - Discuss/demonstrate breast feeding aids (e.g., infant sling, nursing footstool/pillows, and breast pumps).  - Encourage mother/other family members to express feelings/concerns, and actively listen.  - Educate father/SO about benefits of breast feeding and how to manage common lactation challenges.  - Recommend avoidance of specific  medications or substances incompatible with breast feeding.  - Assess and monitor for signs of nipple pain/trauma.  - Instruct and provide assistance with proper latch.  - Review techniques for milk expression (breast pumping) and storage of breast milk. Provide pumping equipment/supplies, instructions and assistance, as needed.  - Encourage rooming-in and breast feeding on demand.  - Encourage skin-to-skin contact.  - Provide LC support as needed.  - Assess for and manage engorgement.  - Provide breast feeding education handouts and information on community breast feeding support.   Outcome: Completed  Goal: Establishment of adequate milk supply with medication/procedure interruptions  Description: INTERVENTIONS:  - Review techniques for milk expression (breast pumping).   - Provide pumping equipment/supplies, instructions, and assistance until it is safe to breastfeed infant.  Outcome: Completed  Goal: Appropriate maternal -  bonding  Description: INTERVENTIONS:  - Assess caregiver- interactions.  - Assess caregiver's emotional status and coping mechanisms.  - Encourage caregiver to participate in  daily care.  - Assess support systems available to mother/family.  - Provide /case management support as needed.  2025 1107 by Yari Gilliam, RN  Outcome: Completed  2025 1100 by Yari Gilliam, RN  Outcome: Progressing     Problem: Patient/Family Goals  Goal: Patient/Family Long Term Goal  Description: Patient's Long Term Goal:     Interventions:  - - See additional Care Plan goals for specific interventions  Outcome: Completed

## 2025-05-17 NOTE — PLAN OF CARE
Problem: POSTPARTUM  Goal: Long Term Goal:Experiences normal postpartum course  Description: INTERVENTIONS:  - Assess and monitor vital signs and lab values.  - Assess fundus and lochia.  - Provide ice/sitz baths for perineum discomfort.  - Monitor healing of incision/episiotomy/laceration, and assess for signs and symptoms of infection and hematoma.  - Assess bladder function and monitor for bladder distention.  - Provide/instruct/assist with pericare as needed.  - Provide VTE prophylaxis as needed.  - Monitor bowel function.  - Encourage ambulation and provide assistance as needed.  - Assess and monitor emotional status and provide social service/psych resources as needed.  - Utilize standard precautions and use personal protective equipment as indicated. Ensure aseptic care of all intravenous lines and invasive tubes/drains.  - Obtain immunization and exposure to communicable diseases history.  5/17/2025 1107 by Yari Gilliam, RN  Outcome: Completed  5/17/2025 1100 by Yari Gilliam, RN  Outcome: Progressing  Goal: Optimize infant feeding at the breast  Description: INTERVENTIONS:  - Initiate breast feeding within first hour after birth.   - Monitor effectiveness of current breast feeding efforts.  - Assess support systems available to mother/family.  - Identify cultural beliefs/practices regarding lactation, letdown techniques, maternal food preferences.  - Assess mother's knowledge and previous experience with breast feeding.  - Provide information as needed about early infant feeding cues (e.g., rooting, lip smacking, sucking fingers/hand) versus late cue of crying.  - Discuss/demonstrate breast feeding aids (e.g., infant sling, nursing footstool/pillows, and breast pumps).  - Encourage mother/other family members to express feelings/concerns, and actively listen.  - Educate father/SO about benefits of breast feeding and how to manage common lactation challenges.  - Recommend avoidance of specific  medications or substances incompatible with breast feeding.  - Assess and monitor for signs of nipple pain/trauma.  - Instruct and provide assistance with proper latch.  - Review techniques for milk expression (breast pumping) and storage of breast milk. Provide pumping equipment/supplies, instructions and assistance, as needed.  - Encourage rooming-in and breast feeding on demand.  - Encourage skin-to-skin contact.  - Provide LC support as needed.  - Assess for and manage engorgement.  - Provide breast feeding education handouts and information on community breast feeding support.   Outcome: Completed  Goal: Establishment of adequate milk supply with medication/procedure interruptions  Description: INTERVENTIONS:  - Review techniques for milk expression (breast pumping).   - Provide pumping equipment/supplies, instructions, and assistance until it is safe to breastfeed infant.  Outcome: Completed  Goal: Appropriate maternal -  bonding  Description: INTERVENTIONS:  - Assess caregiver- interactions.  - Assess caregiver's emotional status and coping mechanisms.  - Encourage caregiver to participate in  daily care.  - Assess support systems available to mother/family.  - Provide /case management support as needed.  2025 1107 by Yari Gilliam, RN  Outcome: Completed  2025 1100 by Yari Gilliam, RN  Outcome: Progressing     Problem: Patient/Family Goals  Goal: Patient/Family Long Term Goal  Description: Patient's Long Term Goal:     Interventions:  -   - See additional Care Plan goals for specific interventions  Outcome: Completed  Goal: Patient/Family Short Term Goal  Description: Patient's Short Term Goal:     Interventions:   -   - See additional Care Plan goals for specific interventions  Outcome: Completed     Problem: GENITOURINARY - ADULT  Goal: Absence of urinary retention  Description: INTERVENTIONS:  - Assess patient’s ability to void and empty bladder  - Monitor  intake/output and perform bladder scan as needed  - Follow urinary retention protocol/standard of care  - Consider collaborating with pharmacy to review patient's medication profile  - Implement strategies to promote bladder emptying  Outcome: Completed     Problem: GASTROINTESTINAL - ADULT  Goal: Minimal or absence of nausea and vomiting  Description: INTERVENTIONS:  - Maintain adequate hydration with IV or PO as ordered and tolerated  - Nasogastric tube to low intermittent suction as ordered  - Evaluate effectiveness of ordered antiemetic medications  - Provide nonpharmacologic comfort measures as appropriate  - Advance diet as tolerated, if ordered  - Obtain nutritional consult as needed  - Evaluate fluid balance  Outcome: Completed  Goal: Maintains or returns to baseline bowel function  Description: INTERVENTIONS:  - Assess bowel function  - Maintain adequate hydration with IV or PO as ordered and tolerated  - Evaluate effectiveness of GI medications  - Encourage mobilization and activity  - Obtain nutritional consult as needed  - Establish a toileting routine/schedule  - Consider collaborating with pharmacy to review patient's medication profile  Outcome: Completed  Goal: Maintains adequate nutritional intake (undernourished)  Description: INTERVENTIONS:  - Monitor percentage of each meal consumed  - Identify factors contributing to decreased intake, treat as appropriate  - Assist with meals as needed  - Monitor I&O, WT and lab values  - Obtain nutritional consult as needed  - Optimize oral hygiene and moisture  - Encourage food from home; allow for food preferences  - Enhance eating environment  Outcome: Completed  Goal: Achieves appropriate nutritional intake (bariatric)  Description: INTERVENTIONS:  - Monitor for over-consumption  - Identify factors contributing to increased intake, treat as appropriate  - Monitor I&O, WT and lab values  - Obtain nutritional consult as needed  - Evaluate psychosocial factors  contributing to over-consumption  Outcome: Completed     Problem: Patient Centered Care  Goal: Patient preferences are identified and integrated in the patient's plan of care  Description: Interventions:  - What would you like us to know as we care for you?   - Provide timely, complete, and accurate information to patient/family  - Incorporate patient and family knowledge, values, beliefs, and cultural backgrounds into the planning and delivery of care  - Encourage patient/family to participate in care and decision-making at the level they choose  - Honor patient and family perspectives and choices  Outcome: Completed

## 2025-05-21 ENCOUNTER — PATIENT MESSAGE (OUTPATIENT)
Dept: OBGYN CLINIC | Facility: CLINIC | Age: 35
End: 2025-05-21

## 2025-05-22 ENCOUNTER — NURSE ONLY (OUTPATIENT)
Dept: OBGYN CLINIC | Facility: CLINIC | Age: 35
End: 2025-05-22
Payer: MEDICAID

## 2025-05-22 VITALS
DIASTOLIC BLOOD PRESSURE: 78 MMHG | WEIGHT: 221.19 LBS | SYSTOLIC BLOOD PRESSURE: 115 MMHG | HEART RATE: 87 BPM | BODY MASS INDEX: 37 KG/M2

## 2025-05-22 DIAGNOSIS — Z01.30 BP CHECK: Primary | ICD-10-CM

## 2025-05-22 PROCEDURE — 99212 OFFICE O/P EST SF 10 MIN: CPT | Performed by: OBSTETRICS & GYNECOLOGY

## 2025-05-22 NOTE — PROGRESS NOTES
Patient is in for BP check. Patient is one week postpartum, she delivered 5/14/2025 with Dr. Torres. Patient has been monitoring her BP at home for the past 4 days and her highest reading has been 139/89 at home with patient's machine and her lowest at home was 118/80. Patient's BP in clinic today was 115/78. Patient was on Magnesium Sulfate in hospital only for 24 hours. Patient was also on Nifedipine 30MG once a day and Labetatol 100MG once every 12 hours. I spoke with Dr. Torres and he advised for the patient to stop taking Labetatol but continue with Nifedipine and to continue monitoring her blood pressure and sending weekly logs. I stated to the patient what the doctor advised and patient verbalized understanding.

## 2025-05-22 NOTE — TELEPHONE ENCOUNTER
Postpartum- delivered 25 by . Discharged .   She was admitted for delivery due to elevated BP on  during NSTs. Labs c/w mild preeclampsia. Received mag. Discharged on Nifedipine XL 30 mg daily.     Scheduled BP check today. Instructed to bring log and BP cuff if we have not checked it.

## 2025-06-04 ENCOUNTER — TELEPHONE (OUTPATIENT)
Dept: OBGYN UNIT | Facility: HOSPITAL | Age: 35
End: 2025-06-04

## 2025-06-06 ENCOUNTER — PATIENT MESSAGE (OUTPATIENT)
Dept: OBGYN CLINIC | Facility: CLINIC | Age: 35
End: 2025-06-06

## 2025-07-01 ENCOUNTER — POSTPARTUM (OUTPATIENT)
Dept: OBGYN CLINIC | Facility: CLINIC | Age: 35
End: 2025-07-01

## 2025-07-01 ENCOUNTER — LAB ENCOUNTER (OUTPATIENT)
Dept: LAB | Facility: HOSPITAL | Age: 35
End: 2025-07-01
Attending: OBSTETRICS & GYNECOLOGY
Payer: MEDICAID

## 2025-07-01 VITALS
HEART RATE: 90 BPM | BODY MASS INDEX: 36 KG/M2 | SYSTOLIC BLOOD PRESSURE: 115 MMHG | WEIGHT: 214 LBS | DIASTOLIC BLOOD PRESSURE: 76 MMHG

## 2025-07-01 LAB
T4 FREE SERPL-MCNC: 1.5 NG/DL (ref 0.8–1.7)
TSI SER-ACNC: 0.3 UIU/ML (ref 0.55–4.78)

## 2025-07-01 PROCEDURE — 36415 COLL VENOUS BLD VENIPUNCTURE: CPT

## 2025-07-01 PROCEDURE — 84443 ASSAY THYROID STIM HORMONE: CPT

## 2025-07-01 PROCEDURE — 84439 ASSAY OF FREE THYROXINE: CPT

## 2025-07-01 NOTE — PROGRESS NOTES
HPI    Shirin Cruz is a 34 year old female  here for 6 week post-partum visit.        History of Present Illness    S/p RCS on 25.  Feeling well.  Pain okay.  Wound well healing.  +BF.  Mood stable.  Bonding well with baby.  Some constipation---using colace.  No complaints today.       [place]  DEPRESSION SCALE  I have been able to laugh and see the funny side of things: As much as I always could  I have looked forward with enjoyment to things: As much as I ever did  I have been anxious or worried for no good reason: Yes, sometimes  I have felt scared or panicky for no good reason: Yes, sometimes  Things have been getting on top of me: No, most of the time I have coped quite well  I have been so unhappy that I have had difficulty sleeping: Not at all  I have felt sad or miserable: No, not at all  I have been so unhappy that I have been crying: No, never  The thought of harming myself has occurred to me: Never  Total: 5    OBSTETRIC HISTORY  OB History    Para Term  AB Living   2 2 2 0 0 2   SAB IAB Ectopic Multiple Live Births   0 0 0 0 2      # Outcome Date GA Lbr Jose/2nd Weight Sex Type Anes PTL Lv   2 Term 25 39w1d  7 lb 5.8 oz (3.34 kg) F Caesarean Spinal N RAYMOND   1 Term 21 40w3d  7 lb 5.5 oz (3.33 kg) F Caesarean EPI N RAYMOND      Complications: Failure to progress       GYNE HISTORY        MEDICATIONS:    Current Outpatient Medications:     docusate sodium 100 MG Oral Cap, Take 100 mg by mouth 2 (two) times daily. AVAILABLE WITHOUT A PRESCRIPTION, Disp: 30 capsule, Rfl: 0    gabapentin 300 MG Oral Cap, Take 1 capsule (300 mg total) by mouth every 8 (eight) hours as needed (For breakthrough pain after using Tylenol and Motrin). (Patient not taking: Reported on 2025), Disp: 21 capsule, Rfl: 0    ibuprofen 600 MG Oral Tab, Take 1 tablet (600 mg total) by mouth every 6 (six) hours as needed for Pain. (Patient not taking: Reported on 2025), Disp: 30 tablet,  Rfl: 0    labetalol 100 MG Oral Tab, Take 1 tablet (100 mg total) by mouth every 12 (twelve) hours. (Patient not taking: Reported on 7/1/2025), Disp: 60 tablet, Rfl: 0    NIFEdipine ER 30 MG Oral Tablet 24 Hr, Take 1 tablet (30 mg total) by mouth daily. (Patient not taking: Reported on 7/1/2025), Disp: 30 tablet, Rfl: 0    Loratadine 5 MG Oral Chew Tab, Chew 1 tablet (5 mg total) by mouth daily. (Patient not taking: Reported on 7/1/2025), Disp: , Rfl:     Prenatal Multivit-Min-Fe-FA (PRENATAL VITAMINS OR), Take by mouth. (Patient not taking: Reported on 7/1/2025), Disp: , Rfl:     levothyroxine 112 MCG Oral Tab, Take 1 tablet (112 mcg total) by mouth before breakfast., Disp: 90 tablet, Rfl: 3    ALLERGIES:  No Known Allergies    PHYSICAL EXAM  Blood pressure 115/76, pulse 90, weight 214 lb (97.1 kg), last menstrual period 07/18/2024, not currently breastfeeding.    General:  Well nourished, well developed woman in no acute distress  Abdomen:  soft, nontender, no masses  Incision: well healed      ASSESSMENT/PLAN    Assessment & Plan      Discussed all options of birthcontrol including ocps, minipill, Mirena or Paragard IUD, nuvaring, orthoevra patch, nexplanon, Depoprovera, condoms or tubal sterilization options. Patient has chosen condom.    Patient to return for annual gyne exam in 6 months.    TSH today---patient to contact DR Trevino to discuss results and mgmt of meds

## 2025-07-01 NOTE — PROGRESS NOTES
The following individual(s) verbally consented to be recorded using ambient AI listening technology and understand that they can each withdraw their consent to this listening technology at any point by asking the clinician to turn off or pause the recording:    Patient name: Shirin Cruz  Additional names:

## 2025-07-02 ENCOUNTER — PATIENT MESSAGE (OUTPATIENT)
Age: 35
End: 2025-07-02

## 2025-07-03 NOTE — TELEPHONE ENCOUNTER
Labs consistent with subclinical hyperthyroidism. No treatment needed at this time. Please notify patient. Will have PCP monitor TSH moving forward when PCP returns.

## 2025-07-03 NOTE — TELEPHONE ENCOUNTER
Patient's GYN ordered thyroid labs and requesting Dr. Ryan interpret the results for medication management.

## 2025-07-06 RX ORDER — LEVOTHYROXINE SODIUM 100 UG/1
100 TABLET ORAL DAILY
Qty: 90 TABLET | Refills: 0 | Status: SHIPPED | OUTPATIENT
Start: 2025-07-06

## (undated) DEVICE — PROXIMATE SKIN STAPLERS (35 WIDE) CONTAINS 35 STAINLESS STEEL STAPLES (FIXED HEAD): Brand: PROXIMATE

## (undated) DEVICE — COTTON UNDERCAST PADDING,REGULAR FINISH: Brand: WEBRIL

## (undated) DEVICE — INTENDED FOR TISSUE SEPARATION, AND OTHER PROCEDURES THAT REQUIRE A SHARP SURGICAL BLADE TO PUNCTURE OR CUT.: Brand: BARD-PARKER ® STAINLESS STEEL BLADES

## (undated) DEVICE — GAMMEX® NON-LATEX PI ORTHO SIZE 7.5, STERILE POLYISOPRENE POWDER-FREE SURGICAL GLOVE: Brand: GAMMEX

## (undated) DEVICE — LOWER EXTREMITY: Brand: MEDLINE INDUSTRIES, INC.

## (undated) DEVICE — VIOLET BRAIDED (POLYGLACTIN 910), SYNTHETIC ABSORBABLE SUTURE: Brand: COATED VICRYL

## (undated) DEVICE — SOL H2O 1000ML BTL

## (undated) DEVICE — GAUZE SPONGES,12 PLY: Brand: CURITY

## (undated) DEVICE — DRILL BIT SYNT 2.5X110 310.25

## (undated) DEVICE — SOL  .9 1000ML BTL

## (undated) DEVICE — CHLORAPREP 26ML APPLICATOR

## (undated) DEVICE — CLIPPER BLADE 3M

## (undated) DEVICE — SPLINT PRECUT SYNTH 4X30

## (undated) DEVICE — C-ARM: Brand: UNBRANDED

## (undated) DEVICE — SPLINT PRECUT SYNTH 5X30

## (undated) DEVICE — DISPOSABLE TOURNIQUET CUFF SINGLE BLADDER, DUAL PORT AND QUICK CONNECT CONNECTOR: Brand: COLOR CUFF

## (undated) DEVICE — 3M™ MEDIPORE™ H SOFT CLOTH SURGICAL TAPE 2864, 4 INCH X 10 YARD (10CM X 9,14M), 12 ROLLS/CASE: Brand: 3M™ MEDIPORE™

## (undated) DEVICE — GAMMEX® NON-LATEX PI ORTHO SIZE 8, STERILE POLYISOPRENE POWDER-FREE SURGICAL GLOVE: Brand: GAMMEX

## (undated) DEVICE — BANDAGE FLXMSTR 11YDX6IN STRL

## (undated) DEVICE — ENCORE® LATEX ACCLAIM SIZE 7.5, STERILE LATEX POWDER-FREE SURGICAL GLOVE: Brand: ENCORE

## (undated) NOTE — LETTER
[address]  [address] [place], [place]      Authorization for Surgical Operation and Procedure     Date:___________                                                                                                         Time:_______ reactions, transmission of diseases such as Hepatitis, AIDS and Cytomegalovirus (CMV) and fluid overload. In the event that I wish to have an autologous transfusion of my own blood, or a directed donor transfusion. I will discuss this with my physician. ends for purposes of reinstating the DNAR order.   10. Patients having a sterilization procedure: I understand that if the procedure is successful the results will be permanent and it will therefore be impossible for me to inseminate, conceive, or bear chil _____________________________  (Signature of Physician)                                                                                         (Date)                                   (Time)

## (undated) NOTE — LETTER
AUTHORIZATION FOR SURGICAL OPERATION OR OTHER PROCEDURE    1.  I hereby authorize Dr. Sheela Pantoja, and [facility] staff assigned to my case to perform the following operation and/or procedure at the [facility]:    _______IUD insertion__________ Patient Name:  ______________________________________________________  (please print)      Patient signature:  ___________________________________________________             Relationship to Patient:           []  Parent    Responsible person

## (undated) NOTE — LETTER
AUTHORIZATION FOR SURGICAL OPERATION OR OTHER PROCEDURE    1. I hereby authorize Dr. Elisa Cordova, and [facility] staff assigned to my case to perform the following operation and/or procedure at the [facility]:    IUD REMOVAL    2. My physician has explained the nature and purpose of the operation or other procedure, possible alternative methods of treatment, the risks involved, and the possibility of complication to me. I acknowledge that no guarantee has been made as to the result that may be obtained. 3.  I recognize that, during the course of this operation, or other procedure, unforseen conditions may necessitate additional or different procedure than those listed above. I, therefore, further authorize and request that the above named physician, his/her physician assistants or designees perform such procedures as are, in his/her professional opinion, necessary and desirable. 4.  Any tissue or organs removed in the operation or other procedure may be disposed of by and at the discretion of the [facility] and [facility]. 5.  I understand that in the event of a medical emergency, I will be transported by local paramedics to [facility] or other hospital emergency department. 6.  I certify that I have read and fully understand the above consent to operation and/or other procedure. 7.  I acknowledge that my physician has explained sedation/analgesia administration to me including the risks and benefits. I consent to the administration of sedation/analgesia as may be necessary or desirable in the judgement of my physician. Witness signature: ___________________________________________________ Date:  ______/______/_____                    Time:  ________ A. M.  P.M.        Patient Name:  ______________________________________________________  (please print)      Patient signature: ___________________________________________________               Statement of Physician  My signature below affirms that prior to the time of the procedure, I have explained to the patient and/or his/her guardian, the risks and benefits involved in the proposed treatment and any reasonable alternative to the proposed treatment. I have also explained the risks and benefits involved in the refusal of the proposed treatment and have answered the patient's questions.                         Date:  ______/______/_______  Provider                      Signature:  __________________________________________________________       Time:  ___________ AALISON ZAMORA

## (undated) NOTE — LETTER
VACCINE ADMINISTRATION RECORD  PARENT / GUARDIAN APPROVAL  Date: 2025  Vaccine administered to: Shirin Cruz     : 1990    MRN: [id number]    A copy of the appropriate Centers for Disease Control and Prevention Vaccine Information statement has been provided. I have read or have had explained the information about the diseases and the vaccines listed below. There was an opportunity to ask questions and any questions were answered satisfactorily. I believe that I understand the benefits and risks of the vaccine cited and ask that the vaccine(s) listed below be given to me or to the person named above (for whom I am authorized to make this request).    VACCINES ADMINISTERED:  Tdap    I have read and hereby agree to be bound by the terms of this agreement as stated above. My signature is valid until revoked by me in writing.  This document is signed by Shirin Cruz, relationship: Self on 2025.:                                                                                                                                         Parent / Guardian Signature                                                Date    Francesca VAUGHAN RN served as a witness to authentication that the identity of the person signing electronically is in fact the person represented as signing.

## (undated) NOTE — LETTER
The [facility]  Scheduling the Birth of Your Baby    You are scheduled for a  delivery on  at 3:30pm with .  You should arrive at the [facility] at 1:30pm.      Please follow the enclosed antimicrobial wash instructions.  This wash should be started 2 days prior to surgery and be continued until the day of surgery, for a total of 3 washes.    There is pre-op testing that has to be done within 2 days before your surgery. These labs must be done within the [facility], not an outside lab. All labs must be scheduled in advance throught  [employer] or by going to [email or web address].    A Nurse from Labor and Delivery  will be calling a few days before your scheduled section to go over instructions regarding an enhanced recovery. If you don't receive a call please call them at [phone].    Unless otherwise instructed by your physician, DO NOT eat or drink anything within 6 hours of your arrival to the [facility].    If you have not preregistered, please mail in your preregistration forms.    The [facility] is located on the 3rd floor of [facility].  Please use the east elevators.    When you arrive, you will be brought to your room and asked to change into a hospital gown.  Your nurse will take your temperature, blood pressure, and pulse and place you on the fetal monitor to monitor the baby's well being and any uterine activity you may be experiencing prior to your delivery.  Your nurse will also ask you some questions, start an intravenous (IV) line, and possibly draw some blood if your lab tests were not completed prior to your arrival.  You will also sign a consent for surgery.    Your partner will be asked to change into scrubs so that he/she can be with you in the operating room for the birth of your child.  There are no cameras or video cameras allowed in the operating room.    You will be interviewed by the  anesthesiologist, support stockings will be applied to your lower legs, and you will be shaved at the incision site.    When surgery is completed, you and your partner will be taken to the recovery room for at least an hour prior to your return to your room.    Please feel free to contact the [facility] with any questions or concerns @ [phone].

## (undated) NOTE — LETTER
[address] [place] [address]      Authorization for Surgical Operation and Procedure     Date:___________                                                                                                         Time:_______ reactions, transmission of diseases such as Hepatitis, AIDS and Cytomegalovirus (CMV) and fluid overload. In the event that I wish to have an autologous transfusion of my own blood, or a directed donor transfusion. I will discuss this with my physician. ends for purposes of reinstating the DNAR order.   10. Patients having a sterilization procedure: I understand that if the procedure is successful the results will be permanent and it will therefore be impossible for me to inseminate, conceive, or bear chil _____________________________  (Signature of Physician)                                                                                         (Date)                                   (Time)

## (undated) NOTE — LETTER
Date & Time: 5/7/2024, 11:45 AM  Patient: Shirin Cruz  Encounter Provider(s):    Neha Cornell PA-C       To Whom It May Concern:    Shirin Cruz was seen and treated in our department on 5/7/2024. She can return to work 5/8/2024.    If you have any questions or concerns, please do not hesitate to call.        _____________________________  Physician/APC Signature

## (undated) NOTE — LETTER
INSTRUCTIONS FOR PRE-SURGICAL   ANTIMICROBIAL BATH/SHOWER    Your doctor has recommended a pre-surgical CHG (chlorhexidine gluconate) shower/bath with Betasept (also sold as Hibiclens).  It reduces bacteria that can potentially cause infection.  Betasept is available at [facility] and usually at your local retail pharmacy.    The average adult will use a total of 6 to 10 ounces for three baths.  That is approximately two 4 oz. Bottles.  Depending on individual size, weight and number of treatments, the amount may vary.    IMPORTANT! Read ALL instructions BEFORE starting.     AVOID these areas:  Head: hair, scalp, eyes, ears, nose and mouth  Genital area (inner vaginal and inner rectal)  All open wounds (including surgical incisions)    CONCENTRATE on these areas:  Under arms  Under breast tissue  Between skin folds  Hair bearing areas of groin and between buttocks    DIRECTIONS:  Take your usual shower or bath, rinse  Pour two ounces of Betasept (or Hibiclens) onto moist washcloth  Avoiding head, wash gently (does not foam)  Let sit on skin for three minutes  Rinse completely with water and towel dry    Repeat bath/shower for three consecutive days:  First bath... Two nights before the day of surgery.  Second bath... The night before surgery.  Third bath... The morning of surgery.  Do not apply lotions, deodorants or powder after the last bath.    DISCARD REMAINING PRODUCT AFTER LAST BATH!    DRUG FACTS    Active Ingredient: Chlorhexidine gluconae solution 4%        Warnings:  For external use only.  Do not use:  If you are allergic to chlorhexidine gluconate or any other ingredients listed on the label  As a pre-surgical cleanser of head or face    STOP USE and notify doctor if :  Irritation or allergic reaction occurs  If contact occurs in eyes, ears, mouth, rinse immediately with cold water.    Keep out of reach of children.  If swallowed get medica help or contact Poison Control Center.   Store between 60-80 degrees F.    Fabric Warning!  CHG WILL STAIN YOUR FABRICS!  Use with care around shower curtains, towels washcloths rugs and clothes.  Wipe surfaces immediately if accidentally splashed.      Laundering Instructions:  CHG skin cleanser will cause stains if used with chlorinated products.  Rinse immediately and completely and use only non-chlorine detergents.

## (undated) NOTE — LETTER
[facility]  Administration of Anesthesia  Shirin NAVARRO agree to be cared for by a physician anesthesiologist alone and/or with a nurse anesthetist, who is specially trained to monitor me and give me medicine to put me to sleep or keep me comfortable during my procedure    I understand that my anesthesiologist and/or anesthetist is not an employee or agent of [facility] or [employer]. He or she works for [facility]    As the patient asking for anesthesia services, I agree to:  Allow the anesthesiologist (anesthesia doctor) to give me medicine and do additional procedures as necessary. Some examples are: Starting or using an “IV” to give me medicine, fluids or blood during my procedure, and having a breathing tube placed to help me breathe when I’m asleep (intubation). In the event that my heart stops working properly, I understand that my anesthesiologist will make every effort to sustain my life, unless otherwise directed by [facility] Do Not Resuscitate documents.  Tell my anesthesia doctor before my procedure:  If I am pregnant.  The last time that I ate or drank.  iii. All of the medicines I take (including prescriptions, herbal supplements, and pills I can buy without a prescription (including street drugs/illegal medications). Failure to inform my anesthesiologist about these medicines may increase my risk of anesthetic complications.  iv.If I am allergic to anything or have had a reaction to anesthesia before.  I understand how the anesthesia medicine will help me (benefits).  I understand that with any type of anesthesia medicine there are risks:  The most common risks are: nausea, vomiting, sore throat, muscle soreness, damage to my eyes, mouth, or teeth (from breathing tube placement).  Rare risks include: remembering what happened during my procedure, allergic reactions to medications, injury to my airway, heart, lungs, vision, nerves, or  muscles and in extremely rare instances death.  My doctor has explained to me other choices available to me for my care (alternatives).  Pregnant Patients (“epidural”):  I understand that the risks of having an epidural (medicine given into my back to help control pain during labor), include itching, low blood pressure, difficulty urinating, headache or slowing of the baby’s heart. Very rare risks include infection, bleeding, seizure, irregular heart rhythms and nerve injury.  Regional Anesthesia (“spinal”, “epidural”, & “nerve blocks”):  I understand that rare but potential complications include headache, bleeding, infection, seizure, irregular heart rhythms, and nerve injury.    _____________________________________________________________________________  Patient (or Representative) Signature/Relationship to Patient  Date   Time    _____________________________________________________________________________   Name (if used)    Language/Organization   Time    _____________________________________________________________________________  Nurse Anesthetist Signature     Date   Time  _____________________________________________________________________________  Anesthesiologist Signature     Date   Time  I have discussed the procedure and information above with the patient (or patient’s representative) and answered their questions. The patient or their representative has agreed to have anesthesia services.    _____________________________________________________________________________  Witness        Date   Time  I have verified that the signature is that of the patient or patient’s representative, and that it was signed before the procedure  Patient Name: Shirin Cruz     : 1990                 Printed: 2025 at 7:35 PM    Medical Record #: [id number]                                            Page 1 of 1  ----------ANESTHESIA CONSENT----------

## (undated) NOTE — LETTER
VACCINE ADMINISTRATION RECORD  PARENT / GUARDIAN APPROVAL  Date: 2020  Vaccine administered to: Giovanny Ocampo     : 1990    MRN: [id number]    A copy of the appropriate Centers for Disease Control and Prevention Vaccine Information statement

## (undated) NOTE — LETTER
[facility]  Administration of Anesthesia  Shirin NAVARRO agree to be cared for by a physician anesthesiologist alone and/or with a nurse anesthetist, who is specially trained to monitor me and give me medicine to put me to sleep or keep me comfortable during my procedure    I understand that my anesthesiologist and/or anesthetist is not an employee or agent of [facility] or [employer]. He or she works for [facility]    As the patient asking for anesthesia services, I agree to:  Allow the anesthesiologist (anesthesia doctor) to give me medicine and do additional procedures as necessary. Some examples are: Starting or using an “IV” to give me medicine, fluids or blood during my procedure, and having a breathing tube placed to help me breathe when I’m asleep (intubation). In the event that my heart stops working properly, I understand that my anesthesiologist will make every effort to sustain my life, unless otherwise directed by [facility] Do Not Resuscitate documents.  Tell my anesthesia doctor before my procedure:  If I am pregnant.  The last time that I ate or drank.  iii. All of the medicines I take (including prescriptions, herbal supplements, and pills I can buy without a prescription (including street drugs/illegal medications). Failure to inform my anesthesiologist about these medicines may increase my risk of anesthetic complications.  iv.If I am allergic to anything or have had a reaction to anesthesia before.  I understand how the anesthesia medicine will help me (benefits).  I understand that with any type of anesthesia medicine there are risks:  The most common risks are: nausea, vomiting, sore throat, muscle soreness, damage to my eyes, mouth, or teeth (from breathing tube placement).  Rare risks include: remembering what happened during my procedure, allergic reactions to medications, injury to my airway, heart, lungs, vision, nerves, or  muscles and in extremely rare instances death.  My doctor has explained to me other choices available to me for my care (alternatives).  Pregnant Patients (“epidural”):  I understand that the risks of having an epidural (medicine given into my back to help control pain during labor), include itching, low blood pressure, difficulty urinating, headache or slowing of the baby’s heart. Very rare risks include infection, bleeding, seizure, irregular heart rhythms and nerve injury.  Regional Anesthesia (“spinal”, “epidural”, & “nerve blocks”):  I understand that rare but potential complications include headache, bleeding, infection, seizure, irregular heart rhythms, and nerve injury.    _____________________________________________________________________________  Patient (or Representative) Signature/Relationship to Patient  Date   Time    _____________________________________________________________________________   Name (if used)    Language/Organization   Time    _____________________________________________________________________________  Nurse Anesthetist Signature     Date   Time  _____________________________________________________________________________  Anesthesiologist Signature     Date   Time  I have discussed the procedure and information above with the patient (or patient’s representative) and answered their questions. The patient or their representative has agreed to have anesthesia services.    _____________________________________________________________________________  Witness        Date   Time  I have verified that the signature is that of the patient or patient’s representative, and that it was signed before the procedure  Patient Name: Shirin Cruz     : 1990                 Printed: 2025 at 7:33 PM    Medical Record #: [id number]                                            Page 1 of 1  ----------ANESTHESIA CONSENT----------

## (undated) NOTE — LETTER
3/15/2022          To Whom It May Concern:    Alexandr Blankenship is currently under my medical care and may return to work from home for the next 4 weeks, she may not bare any weight on her right leg. If you require additional information please contact our office.         Sincerely,    Paloma Ramachandran MD

## (undated) NOTE — LETTER
[facility]  Administration of Anesthesia  1. Elizabeth Don, or _________________________________ acting on her behalf, (Patient) (Dependent/Representative) request to receive anesthesia for my pending procedure/operation/treatment.   NADIA p infections, high spinal block, spinal bleeding, seizure, cardiac arrest and death. 7. AWARENESS: I understand that it is possible (but unlikely) to have explicit memory of events from the operating room while under general anesthesia.   8. ELECTROCONVULSIV unconscious pt /Relationship    My signature below affirms that prior to the time of the procedure, I have explained to the patient and/or his/her guardian, the risks and benefits of undergoing anesthesia, as well as any reasonable alternatives.     _______

## (undated) NOTE — LETTER
10/9/2019              [address] [place] [address]         Dear Mi Interiano,    [facility] records indicate that the tests ordered for you by Antonia Zambrano MD  have not been done.   If you have, in fact, already completed the tests or

## (undated) NOTE — LETTER
[address]      Authorization for Surgical Operation and Procedure     Date:___________                                                                                                         Time:_______ reactions, transmission of diseases such as Hepatitis, AIDS and Cytomegalovirus (CMV) and fluid overload. In the event that I wish to have an autologous transfusion of my own blood, or a directed donor transfusion. I will discuss this with my physician. ends for purposes of reinstating the DNAR order.   10. Patients having a sterilization procedure: I understand that if the procedure is successful the results will be permanent and it will therefore be impossible for me to inseminate, conceive, or bear chil _____________________________  (Signature of Physician)                                                                                         (Date)                                   (Time)

## (undated) NOTE — LETTER
5/17/2022          To Whom It May Concern:    Helio Hamilton is currently under my medical care and may return to work on Monday 5/23/2022 without restrictions. If you require additional information please contact our office.         Sincerely,    Yeny Christine MD